# Patient Record
Sex: MALE | Race: WHITE | HISPANIC OR LATINO | Employment: OTHER | URBAN - METROPOLITAN AREA
[De-identification: names, ages, dates, MRNs, and addresses within clinical notes are randomized per-mention and may not be internally consistent; named-entity substitution may affect disease eponyms.]

---

## 2019-07-02 ENCOUNTER — HOSPITAL ENCOUNTER (EMERGENCY)
Facility: HOSPITAL | Age: 59
Discharge: HOME | End: 2019-07-02
Attending: EMERGENCY MEDICINE
Payer: MEDICAID

## 2019-07-02 VITALS
TEMPERATURE: 99 F | SYSTOLIC BLOOD PRESSURE: 182 MMHG | BODY MASS INDEX: 21.49 KG/M2 | OXYGEN SATURATION: 99 % | RESPIRATION RATE: 18 BRPM | HEART RATE: 79 BPM | HEIGHT: 65 IN | WEIGHT: 129 LBS | DIASTOLIC BLOOD PRESSURE: 73 MMHG

## 2019-07-02 DIAGNOSIS — I73.9 PAD (PERIPHERAL ARTERY DISEASE): Primary | ICD-10-CM

## 2019-07-02 DIAGNOSIS — I73.9 PVD (PERIPHERAL VASCULAR DISEASE) WITH CLAUDICATION: ICD-10-CM

## 2019-07-02 LAB
ANION GAP SERPL CALC-SCNC: 18 MMOL/L (ref 8–16)
BASOPHILS # BLD AUTO: 0.03 K/UL (ref 0–0.2)
BASOPHILS NFR BLD: 0.2 % (ref 0–1.9)
BUN SERPL-MCNC: 18 MG/DL (ref 6–30)
CHLORIDE SERPL-SCNC: 104 MMOL/L (ref 95–110)
CREAT SERPL-MCNC: 0.6 MG/DL (ref 0.5–1.4)
DIFFERENTIAL METHOD: ABNORMAL
EOSINOPHIL # BLD AUTO: 0.1 K/UL (ref 0–0.5)
EOSINOPHIL NFR BLD: 0.7 % (ref 0–8)
ERYTHROCYTE [DISTWIDTH] IN BLOOD BY AUTOMATED COUNT: 14.6 % (ref 11.5–14.5)
GLUCOSE SERPL-MCNC: 119 MG/DL (ref 70–110)
HCT VFR BLD AUTO: 46.1 % (ref 40–54)
HCT VFR BLD CALC: 47 %PCV (ref 36–54)
HGB BLD-MCNC: 14.8 G/DL (ref 14–18)
LYMPHOCYTES # BLD AUTO: 3.4 K/UL (ref 1–4.8)
LYMPHOCYTES NFR BLD: 25.7 % (ref 18–48)
MCH RBC QN AUTO: 30.5 PG (ref 27–31)
MCHC RBC AUTO-ENTMCNC: 32.1 G/DL (ref 32–36)
MCV RBC AUTO: 95 FL (ref 82–98)
MONOCYTES # BLD AUTO: 1 K/UL (ref 0.3–1)
MONOCYTES NFR BLD: 7.5 % (ref 4–15)
NEUTROPHILS # BLD AUTO: 8.7 K/UL (ref 1.8–7.7)
NEUTROPHILS NFR BLD: 66.4 % (ref 38–73)
PLATELET # BLD AUTO: 328 K/UL (ref 150–350)
PMV BLD AUTO: 11.5 FL (ref 9.2–12.9)
POC IONIZED CALCIUM: 1.28 MMOL/L (ref 1.06–1.42)
POC TCO2 (MEASURED): 28 MMOL/L (ref 23–29)
POTASSIUM BLD-SCNC: 4.3 MMOL/L (ref 3.5–5.1)
RBC # BLD AUTO: 4.85 M/UL (ref 4.6–6.2)
SAMPLE: ABNORMAL
SODIUM BLD-SCNC: 145 MMOL/L (ref 136–145)
WBC # BLD AUTO: 13.25 K/UL (ref 3.9–12.7)

## 2019-07-02 PROCEDURE — 99285 EMERGENCY DEPT VISIT HI MDM: CPT | Mod: 25

## 2019-07-02 PROCEDURE — 84295 ASSAY OF SERUM SODIUM: CPT

## 2019-07-02 PROCEDURE — 85014 HEMATOCRIT: CPT

## 2019-07-02 PROCEDURE — 82330 ASSAY OF CALCIUM: CPT

## 2019-07-02 PROCEDURE — 82565 ASSAY OF CREATININE: CPT

## 2019-07-02 PROCEDURE — 99285 PR EMERGENCY DEPT VISIT,LEVEL V: ICD-10-PCS | Mod: ,,, | Performed by: SURGERY

## 2019-07-02 PROCEDURE — 85025 COMPLETE CBC W/AUTO DIFF WBC: CPT

## 2019-07-02 PROCEDURE — 99900035 HC TECH TIME PER 15 MIN (STAT)

## 2019-07-02 PROCEDURE — 99285 EMERGENCY DEPT VISIT HI MDM: CPT | Mod: ,,, | Performed by: SURGERY

## 2019-07-02 PROCEDURE — 84132 ASSAY OF SERUM POTASSIUM: CPT

## 2019-07-02 PROCEDURE — 25000003 PHARM REV CODE 250: Performed by: PHYSICIAN ASSISTANT

## 2019-07-02 PROCEDURE — 25500020 PHARM REV CODE 255: Performed by: EMERGENCY MEDICINE

## 2019-07-02 RX ORDER — GABAPENTIN 300 MG/1
300 CAPSULE ORAL
COMMUNITY
Start: 2019-06-10 | End: 2022-12-05

## 2019-07-02 RX ORDER — AMLODIPINE BESYLATE 5 MG/1
5 TABLET ORAL DAILY
Status: ON HOLD | COMMUNITY
End: 2019-08-14 | Stop reason: HOSPADM

## 2019-07-02 RX ORDER — PRAVASTATIN SODIUM 40 MG/1
40 TABLET ORAL DAILY
COMMUNITY
End: 2022-10-13

## 2019-07-02 RX ORDER — ACETAMINOPHEN 325 MG/1
650 TABLET ORAL
Status: COMPLETED | OUTPATIENT
Start: 2019-07-02 | End: 2019-07-02

## 2019-07-02 RX ORDER — ERGOCALCIFEROL 1.25 MG/1
50000 CAPSULE ORAL
COMMUNITY
End: 2023-03-23 | Stop reason: SDUPTHER

## 2019-07-02 RX ORDER — METFORMIN HYDROCHLORIDE 500 MG/1
1000 TABLET ORAL 2 TIMES DAILY WITH MEALS
COMMUNITY
End: 2022-10-13

## 2019-07-02 RX ORDER — TAMSULOSIN HYDROCHLORIDE 0.4 MG/1
0.4 CAPSULE ORAL DAILY
COMMUNITY
End: 2022-10-13 | Stop reason: SDUPTHER

## 2019-07-02 RX ORDER — HYDROCODONE BITARTRATE AND ACETAMINOPHEN 5; 325 MG/1; MG/1
1 TABLET ORAL EVERY 6 HOURS PRN
Qty: 12 TABLET | Refills: 0 | Status: SHIPPED | OUTPATIENT
Start: 2019-07-02 | End: 2019-07-29

## 2019-07-02 RX ADMIN — IOHEXOL 100 ML: 350 INJECTION, SOLUTION INTRAVENOUS at 06:07

## 2019-07-02 RX ADMIN — ACETAMINOPHEN 650 MG: 325 TABLET, FILM COATED ORAL at 05:07

## 2019-07-02 NOTE — ED TRIAGE NOTES
Patient presents to the ED via personal transportation alone. Patient reports left lower leg and left foot numbness x 1 month. Patient states that he was seen at  and prescribed 300 mg of gabapentin, which is not improving symptoms. Reports difficulty walking due to numbness. Reports hx of DM. Denies hx of strokes. Denies difficulty speaking, vision changes, headaches.

## 2019-07-02 NOTE — ED PROVIDER NOTES
Encounter Date: 7/2/2019       History     Chief Complaint   Patient presents with    Numbness     Numbness to left foot and lower leg x 1 mth.  No relief with gabapentin.     59-year-old male with past medical history including diabetes, hypertension, hyperlipidemia, seizures presents for evaluation of left toe numbness and foot and leg pain for at least 1 month.  Pain is primarily to the dorsum of the foot, extending proximally above the ankle, as well as the distal calf.  He reports the pain is worse when walking, and also worse at night when lying in bed.  He denies swelling, temperature or color change.  He denies injury, wounds, or fever.  He was seen on June 10th at an outside hospital emergency department, treated with gabapentin, and discharged with instructions to follow up with vascular surgery.  He reports having an appointment with unknown vascular surgeon in August, but was unable to wait due to the pain. The gabapentin has not improved his pain.        Review of patient's allergies indicates:  No Known Allergies  Past Medical History:   Diagnosis Date    Diabetes mellitus     Hyperlipidemia     Hypertension     Seizures      Past Surgical History:   Procedure Laterality Date    BRAIN SURGERY      patient states that he had surgery for seizures     History reviewed. No pertinent family history.  Social History     Tobacco Use    Smoking status: Current Every Day Smoker   Substance Use Topics    Alcohol use: Not Currently    Drug use: Not Currently     Review of Systems   Constitutional: Negative for fever.   Respiratory: Negative for shortness of breath.    Cardiovascular: Negative for chest pain.   Gastrointestinal: Negative for nausea.   Musculoskeletal: Positive for arthralgias and myalgias. Negative for back pain and joint swelling.   Skin: Negative for color change, pallor and rash.   Neurological: Positive for numbness. Negative for weakness.   Hematological: Does not bruise/bleed  easily.       Physical Exam     Initial Vitals [07/02/19 1124]   BP Pulse Resp Temp SpO2   (!) 129/58 95 18 98.3 °F (36.8 °C) 97 %      MAP       --         Physical Exam    Vitals reviewed.  Constitutional: He appears well-developed and well-nourished. He is not diaphoretic. No distress.   HENT:   Head: Normocephalic and atraumatic.   Right Ear: External ear normal.   Left Ear: External ear normal.   Nose: Nose normal.   Eyes: Conjunctivae are normal. No scleral icterus.   Neck: Normal range of motion. Neck supple.   Cardiovascular: Normal rate, regular rhythm, normal heart sounds and intact distal pulses.   Pulses:       Radial pulses are 2+ on the right side, and 2+ on the left side.        Popliteal pulses are 1+ on the right side, and 1+ on the left side.        Dorsalis pedis pulses are 1+ on the right side, and 1+ on the left side.        Posterior tibial pulses are 1+ on the right side, and 1+ on the left side.   Pulmonary/Chest: Breath sounds normal. No respiratory distress. He has no wheezes. He has no rhonchi. He has no rales.   Musculoskeletal: Normal range of motion.   Neurological: He is alert and oriented to person, place, and time.   Skin: Skin is warm and dry. No rash noted. No erythema.         ED Course   Procedures  Labs Reviewed - No data to display       Imaging Results          US Lower Extrem Arteries Left with CHRISTIE (Final result)  Result time 07/02/19 14:48:25    Final result by Mayito Espinal MD (07/02/19 14:48:25)                 Impression:      Significant decreased left CHRISTIE consistent with severe peripheral atherosclerotic disease.    Decreased systolic velocities and parvus tardus waveforms of the left popliteal artery and the left runoff vessels consistent with severe atherosclerotic disease.      Electronically signed by: Mayito Espinal  Date:    07/02/2019  Time:    14:48             Narrative:    EXAMINATION:  US ARTERIAL LOWER EXTREMITY LEFT WITH CHRISTIE (XPD)    CLINICAL  HISTORY:  left foot numbness and pain;    TECHNIQUE:  Color Doppler and waveform and grayscale images are obtained.    COMPARISON:  None    FINDINGS:  Segmental pressures for the right and left brachial arteries are respectively 151 and 156.  The segmental pressures for the right  and right posterior tibial is 288.  On the left no dorsalis pedis pulse obtained.  The left posterior tibialis is 45.  This gives a left CHRISTIE of 0.29 indicating severe peripheral atherosclerotic disease.    The right ABIs are 188 and 185, this apparent elevation could indicate noncompressible arteries and still indicate significant atherosclerotic disease.    The left common femoral artery shows a normal waveform with peak systolic velocity of 65.4 centimeters/second.  The mid left SFA has peak systolic velocities of 50.3 and 26.4 centimeters/second with normal waveform suggesting atherosclerotic disease.  The the distal left SFA shows peak systolic velocity 71.2 centimeters/second with normal waveform.    The left popliteal artery shows a parvus tardus waveform and decreased peak systolic velocity of 18.5 centimeters/second.  The left peroneal artery also shows blunted delayed waveform with low peak systolic velocity of 18.5 centimeters/second.  The left posterior tibial artery shows very blunted waveform and peak systolic velocity of 9.7-10.9 cm per 2nd.  The left anterior tibial artery shows very blunted waveform with peak systolic velocity of 23.3-25.2.  The left dorsalis pedis artery shows very blunted waveform with peak systolic velocity 5.9 centimeters/second.                              X-Rays:   Independently Interpreted Readings:   Other Readings:  Ultrasound with CHRISTIE consistent with severe left peripheral atherosclerotic disease    Medical Decision Making:   ED Management:  59-year-old male with 1 month history of left foot pain and paresthesias concerning for vascular disease.  Weak pulses obtained on exam.  No evidence  of acute ischemic limb.  Ultrasound with CHRISTIE shows severe peripheral atherosclerotic disease.  Vascular surgery consulted. Dr. Pyle evaluated the patient in the ED, request CTA abdomen pelvis runoff with bilateral lower extremities and have patient follow up in clinic.  No evidence of infectious process or DVT.  Patient provided short course of analgesics and discharged with vascular surgery follow-up information and return precautions.  He verbalized understanding and agreed with plan.  Case also discussed with the ED attending Dr. Foley.                         Clinical Impression:       ICD-10-CM ICD-9-CM   1. PAD (peripheral artery disease) I73.9 443.9   2. PVD (peripheral vascular disease) with claudication I73.9 443.9                                Johnathan Oneal, PA-C  07/02/19 1261

## 2019-07-03 ENCOUNTER — TELEPHONE (OUTPATIENT)
Dept: VASCULAR SURGERY | Facility: CLINIC | Age: 59
End: 2019-07-03

## 2019-07-03 ENCOUNTER — PES CALL (OUTPATIENT)
Dept: ADMINISTRATIVE | Facility: CLINIC | Age: 59
End: 2019-07-03

## 2019-07-03 DIAGNOSIS — I73.9 PVD (PERIPHERAL VASCULAR DISEASE) WITH CLAUDICATION: Primary | ICD-10-CM

## 2019-07-03 NOTE — SUBJECTIVE & OBJECTIVE
(Not in a hospital admission)    Review of patient's allergies indicates:  No Known Allergies    Past Medical History:   Diagnosis Date    Diabetes mellitus     Hyperlipidemia     Hypertension     Seizures      Past Surgical History:   Procedure Laterality Date    BRAIN SURGERY      patient states that he had surgery for seizures     Family History     None        Tobacco Use    Smoking status: Current Every Day Smoker   Substance and Sexual Activity    Alcohol use: Not Currently    Drug use: Not Currently    Sexual activity: Not on file     Review of Systems   Constitutional: Negative for chills.   HENT: Negative for congestion.    Eyes: Negative for visual disturbance.   Respiratory: Negative for shortness of breath.    Cardiovascular: Negative for chest pain.   Gastrointestinal: Negative for abdominal distention.   Endocrine: Negative for cold intolerance.   Genitourinary: Negative for flank pain.   Musculoskeletal: Negative for back pain.   Skin: Positive for color change. Negative for pallor, rash and wound.   Allergic/Immunologic: Negative for immunocompromised state.   Neurological: Negative for dizziness and weakness.   Hematological: Does not bruise/bleed easily.   Psychiatric/Behavioral: Negative for agitation.     Objective:     Vital Signs (Most Recent):  Temp: 98.6 °F (37 °C) (07/02/19 1842)  Pulse: 79 (07/02/19 1842)  Resp: 18 (07/02/19 1842)  BP: (!) 182/73 (07/02/19 1842)  SpO2: 99 % (07/02/19 1842) Vital Signs (24h Range):  Temp:  [98 °F (36.7 °C)-98.6 °F (37 °C)] 98.6 °F (37 °C)  Pulse:  [74-95] 79  Resp:  [15-18] 18  SpO2:  [95 %-99 %] 99 %  BP: (129-182)/(58-74) 182/73     Weight: 58.5 kg (129 lb)  Body mass index is 21.47 kg/m².    Physical Exam   Constitutional: He is oriented to person, place, and time. He appears well-developed and well-nourished. No distress.   HENT:   Head: Normocephalic and atraumatic.   Eyes: Conjunctivae are normal.   Neck: Neck supple.   Cardiovascular:  Normal rate.   Pulses:       Femoral pulses are 2+ on the right side, and 1+ on the left side.       Dorsalis pedis pulses are Detected w/ doppler on the right side, and Detected w/ doppler on the left side.        Posterior tibial pulses are Detected w/ doppler on the right side, and Detected w/ doppler on the left side.   Pulmonary/Chest: Effort normal.   Abdominal: Soft. He exhibits no distension and no mass. There is no tenderness. There is no rebound and no guarding. No hernia.   Musculoskeletal: Normal range of motion. He exhibits no edema, tenderness or deformity.   Feet:   Right Foot:   Skin Integrity: Negative for ulcer.   Left Foot:   Skin Integrity: Negative for ulcer.   Neurological: He is alert and oriented to person, place, and time. A sensory deficit is present.   Skin: Capillary refill takes 2 to 3 seconds. No rash noted. He is not diaphoretic.        Psychiatric: He has a normal mood and affect.   Vitals reviewed.      Significant Labs:  All pertinent labs from the last 24 hours have been reviewed.    Significant Diagnostics:  I have reviewed all pertinent imaging results/findings within the past 24 hours.

## 2019-07-03 NOTE — HPI
Cj Pyle MD VI                       [facility]                         07/02/2019    HPI:  Jerry Galeana is a 59 y.o. male with There is no problem list on file for this patient.   being managed by PCP and specialists who is here today for evaluation of L foot pain.  Patient states location is L foot occurring for 1 mo.  Went to  [facility] recently and was Rx Gabapentin.  Smokes 1 ppd.  Had vascular surgery f/u at  8/2019.  States he cannot wait until then due to pain.  Associated signs and symptoms include 1/2 block claudication.  Quality is aching and severity is 8/10 at worst.  Symptoms began 1 mo ago.  Alleviating factors include rest and pain medication.  Worsening factors include ambulation.    no MI  no Stroke  Tobacco use: 1 ppd    Past Medical History:   Diagnosis Date    Diabetes mellitus     Hyperlipidemia     Hypertension     Seizures      Past Surgical History:   Procedure Laterality Date    BRAIN SURGERY      patient states that he had surgery for seizures     History reviewed. No pertinent family history.  Social History     Socioeconomic History    Marital status: Legally      Spouse name: Not on file    Number of children: Not on file    Years of education: Not on file    Highest education level: Not on file   Occupational History    Not on file   Social Needs    Financial resource strain: Not on file    Food insecurity:     Worry: Not on file     Inability: Not on file    Transportation needs:     Medical: Not on file     Non-medical: Not on file   Tobacco Use    Smoking status: Current Every Day Smoker   Substance and Sexual Activity    Alcohol use: Not Currently    Drug use: Not Currently    Sexual activity: Not on file   Lifestyle    Physical activity:     Days per week: Not on file     Minutes per session: Not on file    Stress: Not on file   Relationships    Social connections:     Talks on phone: Not on file     Gets together:  Not on file     Attends [employer] service: Not on file     Active member of club or organization: Not on file     Attends meetings of clubs or organizations: Not on file     Relationship status: Not on file   Other Topics Concern    Not on file   Social History Narrative    Not on file     No current facility-administered medications for this encounter.     Current Outpatient Medications:     amLODIPine (NORVASC) 5 MG tablet, Take 5 mg by mouth once daily., Disp: , Rfl:     dulaglutide (TRULICITY) 0.75 mg/0.5 mL PnIj, Inject 0.75 mg into the skin every 7 days., Disp: , Rfl:     ergocalciferol (VITAMIN D2) 50,000 unit Cap, Take 50,000 Units by mouth every 7 days., Disp: , Rfl:     gabapentin (NEURONTIN) 300 MG capsule, Take 300 mg by mouth., Disp: , Rfl:     latanoprost 0.005 % dpem, Apply to eye., Disp: , Rfl:     metFORMIN (GLUCOPHAGE) 500 MG tablet, Take 1,000 mg by mouth 2 (two) times daily with meals. , Disp: , Rfl:     pravastatin (PRAVACHOL) 40 MG tablet, Take 40 mg by mouth once daily., Disp: , Rfl:     tamsulosin (FLOMAX) 0.4 mg Cap, Take 0.4 mg by mouth once daily., Disp: , Rfl:     HYDROcodone-acetaminophen (NORCO) 5-325 mg per tablet, Take 1 tablet by mouth every 6 (six) hours as needed for Pain., Disp: 12 tablet, Rfl: 0

## 2019-07-03 NOTE — ASSESSMENT & PLAN NOTE
-Imaging reviewed.  Pt would benefit from workup for LLE angiogram, angioplasty vs stent placement and possible bypass.    -Will obtain vein mapping  -Prudent that he quits smoking for limb salvage  -Cont exercise

## 2019-07-03 NOTE — CONSULTS
[facility]  Vascular Surgery  Consult Note    Consults  Subjective:     Chief Complaint/Reason for Admission: PVD    History of Present Illness:             Cj Pyle MD RPVI                       [facility]                         07/02/2019    HPI:  Jerry Galeana is a 59 y.o. male with There is no problem list on file for this patient.   being managed by PCP and specialists who is here today for evaluation of L foot pain.  Patient states location is L foot occurring for 1 mo.  Went to  [facility] recently and was Rx Gabapentin.  Smokes 1 ppd.  Had vascular surgery f/u at  8/2019.  States he cannot wait until then due to pain.  Associated signs and symptoms include 1/2 block claudication.  Quality is aching and severity is 8/10 at worst.  Symptoms began 1 mo ago.  Alleviating factors include rest and pain medication.  Worsening factors include ambulation.    no MI  no Stroke  Tobacco use: 1 ppd    Past Medical History:   Diagnosis Date    Diabetes mellitus     Hyperlipidemia     Hypertension     Seizures      Past Surgical History:   Procedure Laterality Date    BRAIN SURGERY      patient states that he had surgery for seizures     History reviewed. No pertinent family history.  Social History     Socioeconomic History    Marital status: Legally      Spouse name: Not on file    Number of children: Not on file    Years of education: Not on file    Highest education level: Not on file   Occupational History    Not on file   Social Needs    Financial resource strain: Not on file    Food insecurity:     Worry: Not on file     Inability: Not on file    Transportation needs:     Medical: Not on file     Non-medical: Not on file   Tobacco Use    Smoking status: Current Every Day Smoker   Substance and Sexual Activity    Alcohol use: Not Currently    Drug use: Not Currently    Sexual activity: Not on file   Lifestyle    Physical activity:     Days per week:  Not on file     Minutes per session: Not on file    Stress: Not on file   Relationships    Social connections:     Talks on phone: Not on file     Gets together: Not on file     Attends Latter day service: Not on file     Active member of club or organization: Not on file     Attends meetings of clubs or organizations: Not on file     Relationship status: Not on file   Other Topics Concern    Not on file   Social History Narrative    Not on file     No current facility-administered medications for this encounter.     Current Outpatient Medications:     amLODIPine (NORVASC) 5 MG tablet, Take 5 mg by mouth once daily., Disp: , Rfl:     dulaglutide (TRULICITY) 0.75 mg/0.5 mL PnIj, Inject 0.75 mg into the skin every 7 days., Disp: , Rfl:     ergocalciferol (VITAMIN D2) 50,000 unit Cap, Take 50,000 Units by mouth every 7 days., Disp: , Rfl:     gabapentin (NEURONTIN) 300 MG capsule, Take 300 mg by mouth., Disp: , Rfl:     latanoprost 0.005 % dpem, Apply to eye., Disp: , Rfl:     metFORMIN (GLUCOPHAGE) 500 MG tablet, Take 1,000 mg by mouth 2 (two) times daily with meals. , Disp: , Rfl:     pravastatin (PRAVACHOL) 40 MG tablet, Take 40 mg by mouth once daily., Disp: , Rfl:     tamsulosin (FLOMAX) 0.4 mg Cap, Take 0.4 mg by mouth once daily., Disp: , Rfl:     HYDROcodone-acetaminophen (NORCO) 5-325 mg per tablet, Take 1 tablet by mouth every 6 (six) hours as needed for Pain., Disp: 12 tablet, Rfl: 0        (Not in a hospital admission)    Review of patient's allergies indicates:  No Known Allergies    Past Medical History:   Diagnosis Date    Diabetes mellitus     Hyperlipidemia     Hypertension     Seizures      Past Surgical History:   Procedure Laterality Date    BRAIN SURGERY      patient states that he had surgery for seizures     Family History     None        Tobacco Use    Smoking status: Current Every Day Smoker   Substance and Sexual Activity    Alcohol use: Not Currently    Drug use: Not  Currently    Sexual activity: Not on file     Review of Systems   Constitutional: Negative for chills.   HENT: Negative for congestion.    Eyes: Negative for visual disturbance.   Respiratory: Negative for shortness of breath.    Cardiovascular: Negative for chest pain.   Gastrointestinal: Negative for abdominal distention.   Endocrine: Negative for cold intolerance.   Genitourinary: Negative for flank pain.   Musculoskeletal: Negative for back pain.   Skin: Positive for color change. Negative for pallor, rash and wound.   Allergic/Immunologic: Negative for immunocompromised state.   Neurological: Negative for dizziness and weakness.   Hematological: Does not bruise/bleed easily.   Psychiatric/Behavioral: Negative for agitation.     Objective:     Vital Signs (Most Recent):  Temp: 98.6 °F (37 °C) (07/02/19 1842)  Pulse: 79 (07/02/19 1842)  Resp: 18 (07/02/19 1842)  BP: (!) 182/73 (07/02/19 1842)  SpO2: 99 % (07/02/19 1842) Vital Signs (24h Range):  Temp:  [98 °F (36.7 °C)-98.6 °F (37 °C)] 98.6 °F (37 °C)  Pulse:  [74-95] 79  Resp:  [15-18] 18  SpO2:  [95 %-99 %] 99 %  BP: (129-182)/(58-74) 182/73     Weight: 58.5 kg (129 lb)  Body mass index is 21.47 kg/m².    Physical Exam   Constitutional: He is oriented to person, place, and time. He appears well-developed and well-nourished. No distress.   HENT:   Head: Normocephalic and atraumatic.   Eyes: Conjunctivae are normal.   Neck: Neck supple.   Cardiovascular: Normal rate.   Pulses:       Femoral pulses are 2+ on the right side, and 1+ on the left side.       Dorsalis pedis pulses are Detected w/ doppler on the right side, and Detected w/ doppler on the left side.        Posterior tibial pulses are Detected w/ doppler on the right side, and Detected w/ doppler on the left side.   Pulmonary/Chest: Effort normal.   Abdominal: Soft. He exhibits no distension and no mass. There is no tenderness. There is no rebound and no guarding. No hernia.   Musculoskeletal: Normal  range of motion. He exhibits no edema, tenderness or deformity.   Feet:   Right Foot:   Skin Integrity: Negative for ulcer.   Left Foot:   Skin Integrity: Negative for ulcer.   Neurological: He is alert and oriented to person, place, and time. A sensory deficit is present.   Skin: Capillary refill takes 2 to 3 seconds. No rash noted. He is not diaphoretic.        Psychiatric: He has a normal mood and affect.   Vitals reviewed.      Significant Labs:  All pertinent labs from the last 24 hours have been reviewed.    Significant Diagnostics:  I have reviewed all pertinent imaging results/findings within the past 24 hours.    Assessment/Plan:     PVD (peripheral vascular disease) with claudication  -Imaging reviewed.  Pt would benefit from workup for LLE angiogram, angioplasty vs stent placement and possible bypass.    -Will obtain vein mapping  -Prudent that he quits smoking for limb salvage  -Cont exercise        Thank you for your consult. I will follow-up with patient. Please contact us if you have any additional questions.    Cj Pyle MD  Vascular Surgery  [facility]

## 2019-07-29 ENCOUNTER — OFFICE VISIT (OUTPATIENT)
Dept: VASCULAR SURGERY | Facility: CLINIC | Age: 59
End: 2019-07-29
Payer: MEDICAID

## 2019-07-29 ENCOUNTER — HOSPITAL ENCOUNTER (OUTPATIENT)
Dept: RADIOLOGY | Facility: HOSPITAL | Age: 59
Discharge: HOME | End: 2019-07-29
Attending: SURGERY
Payer: MEDICAID

## 2019-07-29 VITALS
BODY MASS INDEX: 20 KG/M2 | DIASTOLIC BLOOD PRESSURE: 66 MMHG | HEIGHT: 65 IN | SYSTOLIC BLOOD PRESSURE: 112 MMHG | WEIGHT: 120.06 LBS | HEART RATE: 95 BPM

## 2019-07-29 DIAGNOSIS — F17.210 NICOTINE DEPENDENCE, CIGARETTES, UNCOMPLICATED: ICD-10-CM

## 2019-07-29 DIAGNOSIS — I70.222 ATHEROSCLEROSIS OF NATIVE ARTERY OF LEFT LOWER EXTREMITY WITH REST PAIN: Primary | ICD-10-CM

## 2019-07-29 DIAGNOSIS — I73.9 PVD (PERIPHERAL VASCULAR DISEASE) WITH CLAUDICATION: ICD-10-CM

## 2019-07-29 PROCEDURE — G0365 US VEIN MAPPING: ICD-10-PCS | Mod: 26,,, | Performed by: RADIOLOGY

## 2019-07-29 PROCEDURE — 99214 OFFICE O/P EST MOD 30 MIN: CPT | Mod: PBBFAC,25 | Performed by: SURGERY

## 2019-07-29 PROCEDURE — 99999 PR PBB SHADOW E&M-EST. PATIENT-LVL IV: ICD-10-PCS | Mod: PBBFAC,,, | Performed by: SURGERY

## 2019-07-29 PROCEDURE — 99214 PR OFFICE/OUTPT VISIT, EST, LEVL IV, 30-39 MIN: ICD-10-PCS | Mod: S$PBB,,, | Performed by: SURGERY

## 2019-07-29 PROCEDURE — G0365 VESSEL MAPPING HEMO ACCESS: HCPCS | Mod: TC

## 2019-07-29 PROCEDURE — 99214 OFFICE O/P EST MOD 30 MIN: CPT | Mod: S$PBB,,, | Performed by: SURGERY

## 2019-07-29 PROCEDURE — 99999 PR PBB SHADOW E&M-EST. PATIENT-LVL IV: CPT | Mod: PBBFAC,,, | Performed by: SURGERY

## 2019-07-29 PROCEDURE — G0365 VESSEL MAPPING HEMO ACCESS: HCPCS | Mod: 26,,, | Performed by: RADIOLOGY

## 2019-07-29 NOTE — LETTER
July 29, 2019        Babak Bryan MD  [address] [address]             [facility]  [address] [facility] [address]  [facility] [address]  Phone: [phone]  Fax: [phone]   Patient: Jerry Galeana   MR Number: [id number]   YOB: 1960   Date of Visit: 7/29/2019       Dear Dr. Bryan:    Thank you for referring Jerry Galeana to me for evaluation. Below are the relevant portions of my assessment and plan of care.            If you have questions, please do not hesitate to call me. I look forward to following Jerry along with you.    Sincerely,      Cj Pyle MD           CC  No Recipients

## 2019-07-29 NOTE — PATIENT INSTRUCTIONS
Understanding Peripheral Arterial Disease    Peripheral arteries deliver oxygen-rich blood to the tissues outside the heart. As you age, your arteries become stiffer and thicker. In addition, risk factors, such as smoking and high cholesterol, can damage the artery lining. This allows a buildup of fat and other materials (plaque) to form within the artery walls. The buildup of plaque narrows the space inside the artery and sometimes blocks blood flow. Peripheral arterial disease (PAD) happens when blood flow through the arteries is reduced because of plaque buildup. It often happens in the legs and feet, but can also happen elsewhere in the body. If this buildup happens in the a large artery in the neck (carotid artery), it can be a major contributor to stroke.  A healthy artery  An artery is a muscular tube that carries oxgen rich blood and nutrients from the heart to the rest of the body. It has a smooth lining and flexible walls that allow blood to pass freely. When active, muscles need more oxygen. This increases blood flow. Healthy arteries can adapt to meet this need.  A damaged artery    PAD begins when the lining of an artery is damaged. This is often because of risk factors, such as smoking, older age, or diabetes. Plaque then starts to form within the artery wall. At this stage, blood flows normally, so youre not likely to have symptoms.  A narrowed artery    If plaque continues to build up, the space inside the artery narrows. The artery walls become less able to expand. The artery still provides enough blood and oxygen to your muscles during rest. But when youre active, the increased demand for blood cant be met. As a result, your leg may cramp or ache when you walk.  A blocked artery    An artery can become blocked by plaque or by a blood clot lodged in a narrowed section. When this happens, oxygen cant reach the muscle below the blockage. Then you may feel pain when lying down or when you are not  active (rest pain). This type of pain is especially common at night when youre lying flat. In time, the affected tissue can die. This can lead to the loss of a toe or foot.  Date Last Reviewed: 5/1/2016 © 2000-2017 [employer]. [address] [place] [id number]. All rights reserved. This information is not intended as a substitute for professional medical care. Always follow your healthcare professional's instructions.        Peripheral Artery Disease (PAD)     Peripheral artery disease (PAD) occurs when the arteries that carry blood to the limbs are narrowed or blocked. This is usually due to a buildup of a fatty substance called plaque in the walls of the arteries.  PAD most often affects the arteries in the legs. When these arteries are narrowed or blocked, blood flow to the legs is reduced. This can cause leg and foot pain and other symptoms. If severe enough, reduced blood flow to the legs can lead to tissue death (gangrene) and the loss of a toe, foot, or leg. Having PAD also makes it more likely that arteries in other body areas are blocked. For instance, arteries that carry blood to the heart or brain may be affected. This raises the chances of heart attack and stroke.  Risk factors  Certain factors can make PAD more likely. They include:  · Smoking  · Diabetes  · High blood pressure  · Unhealthy cholesterol levels  · Obesity  · Inactive lifestyle  · Older age  · Family history of PAD  Symptoms  Many people with PAD have no symptoms. If symptoms do occur, they can include:  · Pain in the muscles of the calves, thighs or hips that gets worse with activity and better with rest (intermittent claudication)  · Achy, tired, or heavy feeling in the legs  · Weakness, numbness, tingling, or loss of feeling in the legs  · Changes in skin color of the legs  · Sores on the legs and feet  · Cold leg, feet, or toes  · Pain the feet or toes even when lying down (rest pain)  Home care  PAD is a  chronic (lifelong) condition. Treatment is focused on managing your condition and lowering your health risks. This may include doing the following:  · If you smoke, quit. This helps prevent further damage to your arteries and lowers your health risks. Ask your provider about medicines or products that can help you quit smoking. Also consider joining a stop-smoking program or support group.  · Be more active. This helps you lose weight and manage problems such as high blood pressure and unhealthy cholesterol levels. Start a walking program if advised to by your provider. Your provider may also help you form a safe exercise program that is right for your needs.  · Make healthy eating changes. This includes eating less fat, salt, and sugar.  · Take medicines for high blood pressure, unhealthy cholesterol levels, and diabetes as directed.  · Have your blood pressure and cholesterol levels checked as often as directed.  · If you have diabetes, try to keep your blood sugar well controlled. Test your blood sugar as directed.  · If you are overweight, talk to your provider about forming a weight-loss plan.  · Watch for cuts, scrapes, or open sores on your feet. Poor blood flow to the feet may slow healing and increase the risk of infection from these problems.   Follow-up care  Follow up with your healthcare provider, or as advised. If imaging tests such as ultrasound were done, they will be reviewed by a doctor. You will be told the results and any new findings that may affect your care.  When to seek medical advice   Call your healthcare provider right away if any of these occur:  · Sudden severe pain in the legs or feet  · Sudden cold, pale or blue color in the legs or feet  · Weakness or numbness in the legs or feet that worsens  · Any sore or wound in the legs or feet that wont heal  · Weak pulse in your legs or feet  Know the Signs of Heart Attack and Stroke  People with PAD are at high risk for heart attack and  stroke. Knowing the signs of these problems can help you protect your health and get help when you need it. Call 911 right away if you have any of the following:  Signs of a Heart Attack  · Chest discomfort, such as pain, aching, tightness, or pressure that lasts more than a few minutes, or that comes and goes  · Pain or discomfort in the arms, back, shoulders, neck, or jaw  · Shortness of breath  · Sweating (often a cold, clammy sweat)  · Nausea  · Lightheadedness  Signs of a Stroke  · Sudden numbness or weakness of the face, arms, or legs, especially on one side  · Sudden confusion or trouble speaking or understanding  · Sudden trouble seeing in one or both eyes  · Sudden trouble walking, dizziness, or loss of balance  · Sudden, severe headache with no known cause   Date Last Reviewed: 9/21/2015  © 4651-9094 [employer]. [address] [place] [address]. All rights reserved. This information is not intended as a substitute for professional medical care. Always follow your healthcare professional's instructions.        A Walking Program for Peripheral Arterial Disease (PAD)  Peripheral arterial disease (PAD) is a condition where the arteries in the legs are severely damaged. When you have PAD, walking can be painful. So you may start to walk less. Walking less makes your leg muscles weaker. It then becomes harder and more painful to walk. A walking program can break this cycle. This sheet gives you tips on how to get started.     Walking farther without pain  Exercise strengthens leg muscles that are out of shape. It also trains these muscles to work with less oxygen. This helps your leg muscles work better even with reduced blood flow to your legs. When you have PAD, a walking program can be helpful. Your program can:  · Let you walk longer and farther without claudication. This is an ache in your legs during exercise that goes away with rest.  · Let you do more and be more active  · Add to  your overall health and well-being  · Help you control your blood sugar and blood pressure  · Help you become healthier with no risk and at little or no cost  Getting started  Your local hospital, vascular center, or cardiac rehab center may have a special walking program for people with PAD. If so, this is your best option. But if you cant find a program, or its not covered by insurance, you can still walk on your own. Follow these steps at each session:  · Step 1. Start at a pace that lets you walk for 5 to 10 minutes before you start to feel claudication. This feeling is unpleasant, but it doesnt hurt you. Keep going until the pain makes you feel that you need to stop.  · Step 2. Stop and rest for 3 to 5 minutes, just long enough for the pain to go away. You can rest standing or sitting. (Some people like to bring along a cane or a lightweight folding chair.)  · Step 3. Again walk at a pace that lets you walk for 5 to 10 minutes more before you feel pain. This may be slower than your starting pace in step 1. Then rest again.  · Step 4. Repeat this process until youve walked for 45 minutes. This should be about 60 to 80 minutes total, including resting time. You may not be able to do a full 45 minutes at first. Do as much as you can, and increase your time as you improve.  Making the most of your program  · Walk at least 3 times a week. Take no more than 2 days off between sessions. If you stop walking, even for a week or two, you can lose the health benefits of your program.  · Find a good place to walk. A treadmill or a track may be better at first. That way, you wont run the risk of going too far and finding that you cant walk back. Be sure to have a place to walk indoors in bad weather, such as a gym or a mall.  · Wear shoes with sturdy, flexible soles. The heel should fit without slipping. You should have room to wiggle your toes.  · Keep track of how long you walk. A pedometer will show your daily  progress. It can also show how much farther you can walk as time goes by.  · Ask a friend to keep you company. You may enjoy walking with someone else. Or you may want to make your walking sessions a time to relax by yourself.  · Make it fun. Listen to music while you walk and rest. Walk in a favorite park. Get to know the people at the gym, or the folks that you pass on your route. While you rest, you can window-shop, read, or feed the birds. Do whatever will help you enjoy your exercise sessions.  Date Last Reviewed: 6/1/2016  © 7787-4804 [employer]. [address] [place] [address], [place] [address]. All rights reserved. This information is not intended as a substitute for professional medical care. Always follow your healthcare professional's instructions.

## 2019-07-29 NOTE — PROGRESS NOTES
Cj Pyle MD VI                       [facility]                         07/29/2019    HPI:  Jerry Galeana is a 59 y.o. male with   Patient Active Problem List   Diagnosis    PVD (peripheral vascular disease) with claudication   being managed by PCP and specialists who is here today for evaluation of L foot pain.  Patient states location is L foot occurring for 1 mo.  Went to  ER recently and was Rx Gabapentin.  Smokes 1 ppd.  Had vascular surgery f/u at  8/2019.  States he cannot wait until then due to pain.  Associated signs and symptoms include 1/2 block claudication.  Quality is aching and severity is 8/10 at worst.  Symptoms began 1 mo ago.  Alleviating factors include rest and pain medication.  Worsening factors include ambulation.     no MI  no Stroke  Tobacco use: 1 ppd    Interval history: Cont to c/o L foot pain, dangling at night helps. Smoking 1 pack / 3 days.      Past Medical History:   Diagnosis Date    Diabetes mellitus     Hyperlipidemia     Hypertension     Seizures      Past Surgical History:   Procedure Laterality Date    BRAIN SURGERY      patient states that he had surgery for seizures     History reviewed. No pertinent family history.  Social History     Socioeconomic History    Marital status: Legally      Spouse name: Not on file    Number of children: Not on file    Years of education: Not on file    Highest education level: Not on file   Occupational History    Not on file   Social Needs    Financial resource strain: Not on file    Food insecurity:     Worry: Not on file     Inability: Not on file    Transportation needs:     Medical: Not on file     Non-medical: Not on file   Tobacco Use    Smoking status: Current Every Day Smoker   Substance and Sexual Activity    Alcohol use: Not Currently    Drug use: Not Currently    Sexual activity: Not on file   Lifestyle    Physical activity:     Days per week: Not on file     Minutes  per session: Not on file    Stress: Not on file   Relationships    Social connections:     Talks on phone: Not on file     Gets together: Not on file     Attends Church service: Not on file     Active member of club or organization: Not on file     Attends meetings of clubs or organizations: Not on file     Relationship status: Not on file   Other Topics Concern    Not on file   Social History Narrative    Not on file       Current Outpatient Medications:     amLODIPine (NORVASC) 5 MG tablet, Take 5 mg by mouth once daily., Disp: , Rfl:     dulaglutide (TRULICITY) 0.75 mg/0.5 mL PnIj, Inject 0.75 mg into the skin every 7 days., Disp: , Rfl:     ergocalciferol (VITAMIN D2) 50,000 unit Cap, Take 50,000 Units by mouth every 7 days., Disp: , Rfl:     gabapentin (NEURONTIN) 300 MG capsule, Take 300 mg by mouth., Disp: , Rfl:     latanoprost 0.005 % dpem, Apply to eye., Disp: , Rfl:     metFORMIN (GLUCOPHAGE) 500 MG tablet, Take 1,000 mg by mouth 2 (two) times daily with meals. , Disp: , Rfl:     pravastatin (PRAVACHOL) 40 MG tablet, Take 40 mg by mouth once daily., Disp: , Rfl:     tamsulosin (FLOMAX) 0.4 mg Cap, Take 0.4 mg by mouth once daily., Disp: , Rfl:     REVIEW OF SYSTEMS:  General: No fevers or chills; ENT: No sore throat; Allergy and Immunology: no persistent infections; Hematological and Lymphatic: No history of bleeding or easy bruising; Endocrine: negative; Respiratory: no cough, shortness of breath, or wheezing; Cardiovascular: no chest pain or dyspnea on exertion; Gastrointestinal: no abdominal pain/back, change in bowel habits, or bloody stools; Genito-Urinary: no dysuria, trouble voiding, or hematuria; Musculoskeletal: negative; Neurological: no TIA or stroke symptoms; Psychiatric: no nervousness, anxiety or depression.    PHYSICAL EXAM:      Pulse: 95       Constitutional: He is oriented to person, place, and time. He appears well-developed and well-nourished. No distress.   HENT:    Head: Normocephalic and atraumatic.   Eyes: Conjunctivae are normal.   Neck: Neck supple.   Cardiovascular: Normal rate.   Pulses:       Femoral pulses are 2+ on the right side, and 1+ on the left side.       Dorsalis pedis pulses are Detected w/ doppler on the right side, and Detected w/ doppler on the left side.        Posterior tibial pulses are Detected w/ doppler on the right side, and Detected w/ doppler on the left side.   Pulmonary/Chest: Effort normal.   Abdominal: Soft. He exhibits no distension and no mass. There is no tenderness. There is no rebound and no guarding. No hernia.   Musculoskeletal: Normal range of motion. He exhibits no edema, tenderness or deformity.   Feet:   Right Foot:   Skin Integrity: Negative for ulcer.   Left Foot:   Skin Integrity: Negative for ulcer.   Neurological: He is alert and oriented to person, place, and time. A sensory deficit is present.   Skin: Capillary refill takes 2 to 3 seconds. No rash noted. He is not diaphoretic.     LAB RESULTS:  No results found for: CBC  Lab Results   Component Value Date    LABPROT 13.4 (H) 09/26/2012    INR 1.3 (H) 09/26/2012     Lab Results   Component Value Date     09/26/2012    K 4.5 09/26/2012    CL 99 09/26/2012    CO2 28 09/26/2012     (H) 09/26/2012    BUN 12 09/26/2012    CREATININE 0.6 09/26/2012    CALCIUM 8.8 09/26/2012    ANIONGAP 11 09/26/2012    EGFRNONAA >60 09/26/2012     Lab Results   Component Value Date    WBC 13.25 (H) 07/02/2019    RBC 4.85 07/02/2019    HGB 14.8 07/02/2019    HCT 47 07/02/2019    MCV 95 07/02/2019    MCH 30.5 07/02/2019    MCHC 32.1 07/02/2019    RDW 14.6 (H) 07/02/2019     07/02/2019    MPV 11.5 07/02/2019    GRAN 8.7 (H) 07/02/2019    GRAN 66.4 07/02/2019    LYMPH 3.4 07/02/2019    LYMPH 25.7 07/02/2019    MONO 1.0 07/02/2019    MONO 7.5 07/02/2019    EOS 0.1 07/02/2019    BASO 0.03 07/02/2019    EOSINOPHIL 0.7 07/02/2019    BASOPHIL 0.2 07/02/2019    DIFFMETHOD Automated  07/02/2019     .No results found for: HGBA1C    IMAGING:  All pertinent imaging has been reviewed and interpreted independently.    CTA and vein mapping reviewed.    IMP/PLAN:  59 y.o. male with   Patient Active Problem List   Diagnosis    PVD (peripheral vascular disease) with claudication    being managed by PCP and specialists who is here today for evaluation of LLE PVD.    -Plan for L EIA angioplasty vs stenting as soon as possible - to preop  -I discussed smoking cessation at length with this patient, including treatment options of nicotine replacement therapy and management of the addiction with assistance by [facility].  The patient states understanding that continued smoking will increase the chances of stenosis progression and other cardiovascular morbidity.    I spent 20 minutes evaluating this patient and greater than 50% of the time was spent counseling, coordinator care and discussing the plan of care.  All questions were answered and patient stated understanding with agreement with the above treatment plan.    Cj Pyle MD [facility]  Vascular and Endovascular Surgery

## 2019-07-30 ENCOUNTER — TELEPHONE (OUTPATIENT)
Dept: VASCULAR SURGERY | Facility: CLINIC | Age: 59
End: 2019-07-30

## 2019-07-30 NOTE — TELEPHONE ENCOUNTER
Spoke to patient and explained that Dr. Pyle was booked on the [facility] for surgery. Explained that he could do his surgery on Aug 7th at [facility] on [facility] in the afternoon. He stated he could make that but he needed to scheduled his transportation. Explained to be at [facility] for 11 on the 7th and if anything changed would contact him. He stated understanding. Gave patient date and time of lab work to be done preop.

## 2019-08-05 ENCOUNTER — HOSPITAL ENCOUNTER (INPATIENT)
Facility: HOSPITAL | Age: 59
LOS: 9 days | Discharge: HOME | DRG: 254 | End: 2019-08-14
Attending: EMERGENCY MEDICINE | Admitting: EMERGENCY MEDICINE
Payer: MEDICAID

## 2019-08-05 DIAGNOSIS — Z72.0 TOBACCO ABUSE: ICD-10-CM

## 2019-08-05 DIAGNOSIS — I73.9 PVD (PERIPHERAL VASCULAR DISEASE) WITH CLAUDICATION: ICD-10-CM

## 2019-08-05 DIAGNOSIS — I10 ESSENTIAL HYPERTENSION: ICD-10-CM

## 2019-08-05 DIAGNOSIS — I99.8 ISCHEMIA OF FOOT: ICD-10-CM

## 2019-08-05 DIAGNOSIS — M79.672 FOOT PAIN, LEFT: ICD-10-CM

## 2019-08-05 DIAGNOSIS — I73.9 CLAUDICATION: ICD-10-CM

## 2019-08-05 DIAGNOSIS — E78.5 HYPERLIPIDEMIA, UNSPECIFIED HYPERLIPIDEMIA TYPE: ICD-10-CM

## 2019-08-05 DIAGNOSIS — E11.51 DM (DIABETES MELLITUS) TYPE II, CONTROLLED, WITH PERIPHERAL VASCULAR DISORDER: ICD-10-CM

## 2019-08-05 DIAGNOSIS — I70.229 CRITICAL LOWER LIMB ISCHEMIA: Primary | ICD-10-CM

## 2019-08-05 PROBLEM — N40.0 BPH (BENIGN PROSTATIC HYPERPLASIA): Status: ACTIVE | Noted: 2019-08-05

## 2019-08-05 LAB
ALBUMIN SERPL BCP-MCNC: 3.2 G/DL (ref 3.5–5.2)
ALP SERPL-CCNC: 113 U/L (ref 55–135)
ALT SERPL W/O P-5'-P-CCNC: 13 U/L (ref 10–44)
ANION GAP SERPL CALC-SCNC: 10 MMOL/L (ref 8–16)
APTT BLDCRRT: 23 SEC (ref 21–32)
APTT BLDCRRT: 23 SEC (ref 21–32)
AST SERPL-CCNC: 12 U/L (ref 10–40)
BASOPHILS # BLD AUTO: 0.03 K/UL (ref 0–0.2)
BASOPHILS NFR BLD: 0.3 % (ref 0–1.9)
BILIRUB SERPL-MCNC: 0.6 MG/DL (ref 0.1–1)
BUN SERPL-MCNC: 11 MG/DL (ref 8–23)
CALCIUM SERPL-MCNC: 9.5 MG/DL (ref 8.7–10.5)
CHLORIDE SERPL-SCNC: 108 MMOL/L (ref 95–110)
CO2 SERPL-SCNC: 25 MMOL/L (ref 23–29)
CREAT SERPL-MCNC: 0.6 MG/DL (ref 0.5–1.4)
DIFFERENTIAL METHOD: ABNORMAL
EOSINOPHIL # BLD AUTO: 0.1 K/UL (ref 0–0.5)
EOSINOPHIL NFR BLD: 1.1 % (ref 0–8)
ERYTHROCYTE [DISTWIDTH] IN BLOOD BY AUTOMATED COUNT: 14.2 % (ref 11.5–14.5)
EST. GFR  (AFRICAN AMERICAN): >60 ML/MIN/1.73 M^2
EST. GFR  (NON AFRICAN AMERICAN): >60 ML/MIN/1.73 M^2
GLUCOSE SERPL-MCNC: 63 MG/DL (ref 70–110)
HCT VFR BLD AUTO: 38.6 % (ref 40–54)
HGB BLD-MCNC: 12.6 G/DL (ref 14–18)
INR PPP: 1 (ref 0.8–1.2)
INR PPP: 1 (ref 0.8–1.2)
LACTATE SERPL-SCNC: 0.9 MMOL/L (ref 0.5–2.2)
LYMPHOCYTES # BLD AUTO: 2.7 K/UL (ref 1–4.8)
LYMPHOCYTES NFR BLD: 26.4 % (ref 18–48)
MCH RBC QN AUTO: 30 PG (ref 27–31)
MCHC RBC AUTO-ENTMCNC: 32.6 G/DL (ref 32–36)
MCV RBC AUTO: 92 FL (ref 82–98)
MONOCYTES # BLD AUTO: 1.2 K/UL (ref 0.3–1)
MONOCYTES NFR BLD: 11.8 % (ref 4–15)
NEUTROPHILS # BLD AUTO: 6.2 K/UL (ref 1.8–7.7)
NEUTROPHILS NFR BLD: 60.7 % (ref 38–73)
PLATELET # BLD AUTO: 131 K/UL (ref 150–350)
PLATELET BLD QL SMEAR: ABNORMAL
PMV BLD AUTO: 12.1 FL (ref 9.2–12.9)
POCT GLUCOSE: 109 MG/DL (ref 70–110)
POCT GLUCOSE: 149 MG/DL (ref 70–110)
POCT GLUCOSE: 48 MG/DL (ref 70–110)
POTASSIUM SERPL-SCNC: 3.6 MMOL/L (ref 3.5–5.1)
PROT SERPL-MCNC: 6.6 G/DL (ref 6–8.4)
PROTHROMBIN TIME: 10.3 SEC (ref 9–12.5)
PROTHROMBIN TIME: 10.3 SEC (ref 9–12.5)
RBC # BLD AUTO: 4.2 M/UL (ref 4.6–6.2)
SODIUM SERPL-SCNC: 143 MMOL/L (ref 136–145)
WBC # BLD AUTO: 10.18 K/UL (ref 3.9–12.7)

## 2019-08-05 PROCEDURE — 99285 EMERGENCY DEPT VISIT HI MDM: CPT

## 2019-08-05 PROCEDURE — 80053 COMPREHEN METABOLIC PANEL: CPT

## 2019-08-05 PROCEDURE — 63600175 PHARM REV CODE 636 W HCPCS: Performed by: EMERGENCY MEDICINE

## 2019-08-05 PROCEDURE — 99232 PR SUBSEQUENT HOSPITAL CARE,LEVL II: ICD-10-PCS | Mod: ,,, | Performed by: SURGERY

## 2019-08-05 PROCEDURE — 85730 THROMBOPLASTIN TIME PARTIAL: CPT | Mod: 91

## 2019-08-05 PROCEDURE — 25000003 PHARM REV CODE 250: Performed by: EMERGENCY MEDICINE

## 2019-08-05 PROCEDURE — 25000003 PHARM REV CODE 250: Performed by: HOSPITALIST

## 2019-08-05 PROCEDURE — 99232 SBSQ HOSP IP/OBS MODERATE 35: CPT | Mod: ,,, | Performed by: SURGERY

## 2019-08-05 PROCEDURE — 11000001 HC ACUTE MED/SURG PRIVATE ROOM

## 2019-08-05 PROCEDURE — 85610 PROTHROMBIN TIME: CPT | Mod: 91

## 2019-08-05 PROCEDURE — 83605 ASSAY OF LACTIC ACID: CPT

## 2019-08-05 PROCEDURE — 85025 COMPLETE CBC W/AUTO DIFF WBC: CPT | Mod: 91

## 2019-08-05 RX ORDER — PANTOPRAZOLE SODIUM 40 MG/1
40 TABLET, DELAYED RELEASE ORAL DAILY
Status: DISCONTINUED | OUTPATIENT
Start: 2019-08-05 | End: 2019-08-14 | Stop reason: HOSPADM

## 2019-08-05 RX ORDER — HYDROMORPHONE HYDROCHLORIDE 2 MG/ML
0.5 INJECTION, SOLUTION INTRAMUSCULAR; INTRAVENOUS; SUBCUTANEOUS
Status: COMPLETED | OUTPATIENT
Start: 2019-08-05 | End: 2019-08-05

## 2019-08-05 RX ORDER — PRAVASTATIN SODIUM 40 MG/1
40 TABLET ORAL NIGHTLY
Status: DISCONTINUED | OUTPATIENT
Start: 2019-08-05 | End: 2019-08-14 | Stop reason: HOSPADM

## 2019-08-05 RX ORDER — CLONIDINE HYDROCHLORIDE 0.1 MG/1
0.1 TABLET ORAL EVERY 4 HOURS PRN
Status: DISCONTINUED | OUTPATIENT
Start: 2019-08-05 | End: 2019-08-14 | Stop reason: HOSPADM

## 2019-08-05 RX ORDER — OXYCODONE HYDROCHLORIDE 5 MG/1
5 TABLET ORAL EVERY 4 HOURS PRN
Status: DISCONTINUED | OUTPATIENT
Start: 2019-08-05 | End: 2019-08-07

## 2019-08-05 RX ORDER — INSULIN ASPART 100 [IU]/ML
0-5 INJECTION, SOLUTION INTRAVENOUS; SUBCUTANEOUS EVERY 6 HOURS PRN
Status: DISCONTINUED | OUTPATIENT
Start: 2019-08-05 | End: 2019-08-07

## 2019-08-05 RX ORDER — ACETAMINOPHEN 325 MG/1
650 TABLET ORAL EVERY 8 HOURS PRN
Status: DISCONTINUED | OUTPATIENT
Start: 2019-08-05 | End: 2019-08-14 | Stop reason: HOSPADM

## 2019-08-05 RX ORDER — ONDANSETRON 8 MG/1
8 TABLET, ORALLY DISINTEGRATING ORAL EVERY 8 HOURS PRN
Status: DISCONTINUED | OUTPATIENT
Start: 2019-08-05 | End: 2019-08-14 | Stop reason: HOSPADM

## 2019-08-05 RX ORDER — TAMSULOSIN HYDROCHLORIDE 0.4 MG/1
0.4 CAPSULE ORAL DAILY
Status: DISCONTINUED | OUTPATIENT
Start: 2019-08-06 | End: 2019-08-14 | Stop reason: HOSPADM

## 2019-08-05 RX ORDER — GLUCAGON 1 MG
1 KIT INJECTION
Status: DISCONTINUED | OUTPATIENT
Start: 2019-08-05 | End: 2019-08-14 | Stop reason: HOSPADM

## 2019-08-05 RX ORDER — HYDROMORPHONE HYDROCHLORIDE 2 MG/ML
0.5 INJECTION, SOLUTION INTRAMUSCULAR; INTRAVENOUS; SUBCUTANEOUS EVERY 4 HOURS PRN
Status: DISCONTINUED | OUTPATIENT
Start: 2019-08-05 | End: 2019-08-07

## 2019-08-05 RX ORDER — SODIUM CHLORIDE 9 MG/ML
INJECTION, SOLUTION INTRAVENOUS CONTINUOUS
Status: DISCONTINUED | OUTPATIENT
Start: 2019-08-06 | End: 2019-08-11

## 2019-08-05 RX ORDER — HEPARIN SODIUM,PORCINE/D5W 25000/250
12 INTRAVENOUS SOLUTION INTRAVENOUS CONTINUOUS
Status: DISCONTINUED | OUTPATIENT
Start: 2019-08-05 | End: 2019-08-06

## 2019-08-05 RX ORDER — AMLODIPINE BESYLATE 5 MG/1
10 TABLET ORAL DAILY
Status: DISCONTINUED | OUTPATIENT
Start: 2019-08-06 | End: 2019-08-14 | Stop reason: HOSPADM

## 2019-08-05 RX ORDER — SODIUM CHLORIDE 0.9 % (FLUSH) 0.9 %
10 SYRINGE (ML) INJECTION
Status: DISCONTINUED | OUTPATIENT
Start: 2019-08-05 | End: 2019-08-14 | Stop reason: HOSPADM

## 2019-08-05 RX ORDER — GABAPENTIN 300 MG/1
300 CAPSULE ORAL 3 TIMES DAILY
Status: DISCONTINUED | OUTPATIENT
Start: 2019-08-05 | End: 2019-08-14 | Stop reason: HOSPADM

## 2019-08-05 RX ADMIN — OXYCODONE HYDROCHLORIDE 5 MG: 5 TABLET ORAL at 06:08

## 2019-08-05 RX ADMIN — HEPARIN SODIUM AND DEXTROSE 12 UNITS/KG/HR: 10000; 5 INJECTION INTRAVENOUS at 08:08

## 2019-08-05 RX ADMIN — GABAPENTIN 300 MG: 300 CAPSULE ORAL at 08:08

## 2019-08-05 RX ADMIN — PRAVASTATIN SODIUM 40 MG: 40 TABLET ORAL at 08:08

## 2019-08-05 RX ADMIN — HYDROMORPHONE HYDROCHLORIDE 0.5 MG: 2 INJECTION, SOLUTION INTRAMUSCULAR; INTRAVENOUS; SUBCUTANEOUS at 04:08

## 2019-08-05 RX ADMIN — OXYCODONE HYDROCHLORIDE 5 MG: 5 TABLET ORAL at 10:08

## 2019-08-05 RX ADMIN — PANTOPRAZOLE SODIUM 40 MG: 40 TABLET, DELAYED RELEASE ORAL at 06:08

## 2019-08-05 NOTE — SUBJECTIVE & OBJECTIVE
Past Medical History:   Diagnosis Date    Cigarette smoker     Diabetes mellitus     History of alcohol abuse     Hyperlipidemia     Hypertension     PVD (peripheral vascular disease)     Seizures        Past Surgical History:   Procedure Laterality Date    BRAIN SURGERY      patient states that he had surgery for seizures       Review of patient's allergies indicates:  No Known Allergies    No current facility-administered medications on file prior to encounter.      Current Outpatient Medications on File Prior to Encounter   Medication Sig    amLODIPine (NORVASC) 5 MG tablet Take 5 mg by mouth once daily.    dulaglutide (TRULICITY) 0.75 mg/0.5 mL PnIj Inject 0.75 mg into the skin every 7 days.    ergocalciferol (VITAMIN D2) 50,000 unit Cap Take 50,000 Units by mouth every 7 days.    gabapentin (NEURONTIN) 300 MG capsule Take 300 mg by mouth.    latanoprost 0.005 % dpem Apply to eye.    metFORMIN (GLUCOPHAGE) 500 MG tablet Take 1,000 mg by mouth 2 (two) times daily with meals.     pravastatin (PRAVACHOL) 40 MG tablet Take 40 mg by mouth once daily.    tamsulosin (FLOMAX) 0.4 mg Cap Take 0.4 mg by mouth once daily.     Family History     None        Tobacco Use    Smoking status: Current Every Day Smoker     Packs/day: 1.50    Smokeless tobacco: Never Used   Substance and Sexual Activity    Alcohol use: Not Currently     Comment: History of abuse    Drug use: Not Currently    Sexual activity: Not on file     Review of Systems   Constitutional: Positive for activity change and appetite change.   HENT: Negative for congestion.    Eyes: Negative for discharge and itching.   Respiratory: Negative for chest tightness.    Cardiovascular: Negative for chest pain and leg swelling.   Gastrointestinal: Negative for abdominal distention and abdominal pain.   Endocrine: Negative for polydipsia.   Genitourinary: Negative for difficulty urinating and dysuria.   Musculoskeletal: Positive for myalgias.   Skin:  Positive for color change and pallor.   Allergic/Immunologic: Negative for environmental allergies and food allergies.   Neurological: Negative for dizziness and facial asymmetry.   Hematological: Negative for adenopathy. Does not bruise/bleed easily.   Psychiatric/Behavioral: Negative for agitation and behavioral problems.     Objective:     Vital Signs (Most Recent):  Temp: 98.7 °F (37.1 °C) (08/05/19 1650)  Pulse: 95 (08/05/19 1601)  Resp: 18 (08/05/19 1411)  BP: (!) 158/67 (08/05/19 1601)  SpO2: 99 % (08/05/19 1601) Vital Signs (24h Range):  Temp:  [98.2 °F (36.8 °C)-98.7 °F (37.1 °C)] 98.7 °F (37.1 °C)  Pulse:  [] 95  Resp:  [18] 18  SpO2:  [96 %-99 %] 99 %  BP: (134-158)/(58-68) 158/67     Weight: 54.4 kg (120 lb)  Body mass index is 19.97 kg/m².    Physical Exam   Constitutional: He is oriented to person, place, and time. No distress.   HENT:   Head: Atraumatic.   Eyes: Pupils are equal, round, and reactive to light. EOM are normal.   Neck: Normal range of motion. Neck supple.   Cardiovascular: Normal rate and regular rhythm.   Pulmonary/Chest: Effort normal and breath sounds normal.   Abdominal: Soft.   Musculoskeletal: Normal range of motion. He exhibits tenderness. He exhibits no deformity.   Cold left leg,no palpable pulse.   Neurological: He is oriented to person, place, and time. No cranial nerve deficit. Coordination normal.   Skin: Skin is warm. There is pallor.   Psychiatric: He has a normal mood and affect. His behavior is normal.         CRANIAL NERVES     CN III, IV, VI   Pupils are equal, round, and reactive to light.  Extraocular motions are normal.        Significant Labs:   BMP:   Recent Labs   Lab 08/05/19  0922         K 3.9      CO2 30*   BUN 11   CREATININE 0.7   CALCIUM 10.4   MG 1.9     CBC:   Recent Labs   Lab 08/05/19 0922   WBC 13.46*   HGB 14.7   HCT 45.7

## 2019-08-05 NOTE — HPI
This is a 59 y.o male patient, with a PMHx of diabetes mellitus, hypertension, and hyperlipidemia, presenting to the ED with a complaint of 10/10, sharp, chronic, left ankle and foot pain, numbness, and swelling, that began a few months ago, and worsened x4 days ago. He reports the pain at rest began yesterday. Patient states the pain is worse with movement. Patient reports he's having a surgery in x2 days on his left leg due to his atherosclerosis by , . Patient denies any fever, chills, shortness of breath, chest pain, neck pain, back pain, abdominal pain, rash, headaches, congestion, rhinorrhea, cough, sore throat, ear pain, eye pain, blurred vision, nausea, vomiting, diarrhea, dysuria, or any other associated symptoms. No prior Tx.. He reports he stopped smoking cigarettes x1 week ago. evaluated patient in ER recommending heparin drip,US arterial and X ray and plan for revascularization AM.

## 2019-08-05 NOTE — ED PROVIDER NOTES
"Encounter Date: 8/5/2019    SCRIBE #1 NOTE: I, Lela Wolf, am scribing for, and in the presence of,  Adrien Odell MD. I have scribed the entire note.       History     Chief Complaint   Patient presents with    Leg Swelling     Pt reports chronic Left leg swelling x last few months. Pt reports no OTC pain medication taken, but "unable to ambulate at home in the last week due to pain."     CC: Ankle pain    HPI: This is a 59 y.o male patient, with a PMHx of diabetes mellitus, hypertension, and hyperlipidemia, presenting to the ED with a complaint of 10/10, sharp, chronic, left ankle and foot pain, numbness, and swelling, that began a few months ago, and worsened x4 days ago. He reports the pain at rest began yesterday. Patient states the pain is worse with movement. Patient reports he's having a surgery in x2 days on his left leg due to his atherosclerosis . Patient denies any fever, chills, shortness of breath, chest pain, neck pain, back pain, abdominal pain, rash, headaches, congestion, rhinorrhea, cough, sore throat, ear pain, eye pain, blurred vision, nausea, vomiting, diarrhea, dysuria, or any other associated symptoms. No prior Tx. No known drug allergies. He reports he stopped smoking cigarettes x1 week ago.      The history is provided by the patient. No  was used.     Review of patient's allergies indicates:  No Known Allergies  Past Medical History:   Diagnosis Date    Cigarette smoker     Diabetes mellitus     History of alcohol abuse     Hyperlipidemia     Hypertension     PVD (peripheral vascular disease)     Seizures      Past Surgical History:   Procedure Laterality Date    BRAIN SURGERY      patient states that he had surgery for seizures     History reviewed. No pertinent family history.  Social History     Tobacco Use    Smoking status: Current Every Day Smoker     Packs/day: 1.50    Smokeless tobacco: Never Used   Substance Use Topics    Alcohol use: Not " Currently     Comment: History of abuse    Drug use: Not Currently     Review of Systems   Constitutional: Negative for chills and fever.   HENT: Negative for congestion, ear pain, rhinorrhea and sore throat.    Eyes: Negative for pain and visual disturbance.   Respiratory: Negative for cough and shortness of breath.    Cardiovascular: Negative for chest pain.   Gastrointestinal: Negative for abdominal pain, diarrhea, nausea and vomiting.   Genitourinary: Negative for dysuria.   Musculoskeletal: Positive for arthralgias (left ankle and foot) and joint swelling (left ankle and foot). Negative for back pain and neck pain.   Skin: Negative for rash.   Neurological: Positive for numbness (left foot). Negative for headaches.       Physical Exam     Initial Vitals [08/05/19 1411]   BP Pulse Resp Temp SpO2   (!) 145/66 99 18 98.2 °F (36.8 °C) 97 %      MAP       --         Physical Exam    Nursing note and vitals reviewed.  Constitutional: He appears well-developed and well-nourished.   HENT:   Head: Normocephalic and atraumatic.   Eyes: EOM are normal. Pupils are equal, round, and reactive to light.   Neck: Full passive range of motion without pain. Neck supple. No thyromegaly present. No JVD present.   Cardiovascular: Normal rate and regular rhythm. Exam reveals no gallop and no friction rub.    No murmur heard.  Pulmonary/Chest: Breath sounds normal. No respiratory distress.   Abdominal: Soft. Bowel sounds are normal. There is no tenderness.   Musculoskeletal: He exhibits edema and tenderness.   Cool, mildly dusky left foot .No palpable pulse. No evidence of gangrene. Mild edema. TTP. Cap refill greater than 2 seconds. Unable to doppler pulse.    Neurological: He is alert and oriented to person, place, and time. He has normal strength. GCS score is 15. GCS eye subscore is 4. GCS verbal subscore is 5. GCS motor subscore is 6.   Skin: Skin is warm and dry. Capillary refill takes 2 to 3 seconds.         ED Course    Procedures  Labs Reviewed   CBC W/ AUTO DIFFERENTIAL   COMPREHENSIVE METABOLIC PANEL   LACTIC ACID, PLASMA   PROTIME-INR   APTT   APTT   PROTIME-INR   CBC W/ AUTO DIFFERENTIAL          Imaging Results          X-Ray Foot Complete Left (In process)                US Lower Extremity Arteries Left (In process)                  Medical Decision Making:   History:   Old Medical Records: I decided to obtain old medical records.  Initial Assessment:   Jerry Galeana is a 59 y.o. male presenting for an emergent evaluation of10/10, sharp, chronic, left ankle and foot pain, numbness, and swelling . Exam revealed cool, mildly dusky left foot with no palpable pulse. Will obtain labs, and given the extent of pain, imaging. I will treat the pt's symptoms appropriately and reassess.  Clinical Tests:   Lab Tests: Ordered and Reviewed  Radiological Study: Reviewed and Ordered  ED Management:  2:54 PM  Consulted with Dr. Pyle.  4:17 PM  Consulted with Dr. Means.     Dr. Pyle with vascular surgery came in the emergency room to evaluate the patient.  He recommends admitting the patient on a heparin drip using the ACS protocol and he will perform surgery on the patient tomorrow afternoon.  Discussed with Dr. Means who accepted the patient.  The vascular surgeon also requested an arterial ultrasound now the left lower extremity as well as x-ray of the left foot.                     Clinical Impression:       ICD-10-CM ICD-9-CM   1. PVD (peripheral vascular disease) with claudication I73.9 443.9   2. Claudication I73.9 443.9   3. Ischemia of foot I99.8 459.9   4. Foot pain, left M79.672 729.5                 Scribe attestation: I, Adrien Odell, personally performed the services described in this documentation. All medical record entries made by the scribe were at my direction and in my presence.  I have reviewed the chart and agree that the record reflects my personal performance and is accurate and complete.               Adrien  THERESA Odell MD  08/05/19 6427

## 2019-08-05 NOTE — H&P
"[facility] Medicine  History & Physical    Patient Name: Jerry Galeana  MRN: [id number]  Admission Date: 8/5/2019  Attending Physician: Mile Zavala    Primary Care Provider: Babak Bryan MD         Patient information was obtained from patient and ER records.     Subjective:     Principal Problem:PVD (peripheral vascular disease) with claudication    Chief Complaint:   Chief Complaint   Patient presents with    Leg Swelling     Pt reports chronic Left leg swelling x last few months. Pt reports no OTC pain medication taken, but "unable to ambulate at home in the last week due to pain."        HPI: This is a 59 y.o male patient, with a PMHx of diabetes mellitus, hypertension, and hyperlipidemia, presenting to the ED with a complaint of 10/10, sharp, chronic, left ankle and foot pain, numbness, and swelling, that began a few months ago, and worsened x4 days ago. He reports the pain at rest began yesterday. Patient states the pain is worse with movement. Patient reports he's having a surgery in x2 days on his left leg due to his atherosclerosis by , . Patient denies any fever, chills, shortness of breath, chest pain, neck pain, back pain, abdominal pain, rash, headaches, congestion, rhinorrhea, cough, sore throat, ear pain, eye pain, blurred vision, nausea, vomiting, diarrhea, dysuria, or any other associated symptoms. No prior Tx.. He reports he stopped smoking cigarettes x1 week ago. evaluated patient in ER recommending heparin drip,US arterial and X ray and plan for revascularization AM.    Past Medical History:   Diagnosis Date    Cigarette smoker     Diabetes mellitus     History of alcohol abuse     Hyperlipidemia     Hypertension     PVD (peripheral vascular disease)     Seizures        Past Surgical History:   Procedure Laterality Date    BRAIN SURGERY      patient states that he had surgery for seizures       Review of patient's allergies " indicates:  No Known Allergies    No current facility-administered medications on file prior to encounter.      Current Outpatient Medications on File Prior to Encounter   Medication Sig    amLODIPine (NORVASC) 5 MG tablet Take 5 mg by mouth once daily.    dulaglutide (TRULICITY) 0.75 mg/0.5 mL PnIj Inject 0.75 mg into the skin every 7 days.    ergocalciferol (VITAMIN D2) 50,000 unit Cap Take 50,000 Units by mouth every 7 days.    gabapentin (NEURONTIN) 300 MG capsule Take 300 mg by mouth.    latanoprost 0.005 % dpem Apply to eye.    metFORMIN (GLUCOPHAGE) 500 MG tablet Take 1,000 mg by mouth 2 (two) times daily with meals.     pravastatin (PRAVACHOL) 40 MG tablet Take 40 mg by mouth once daily.    tamsulosin (FLOMAX) 0.4 mg Cap Take 0.4 mg by mouth once daily.     Family History     None        Tobacco Use    Smoking status: Current Every Day Smoker     Packs/day: 1.50    Smokeless tobacco: Never Used   Substance and Sexual Activity    Alcohol use: Not Currently     Comment: History of abuse    Drug use: Not Currently    Sexual activity: Not on file     Review of Systems   Constitutional: Positive for activity change and appetite change.   HENT: Negative for congestion.    Eyes: Negative for discharge and itching.   Respiratory: Negative for chest tightness.    Cardiovascular: Negative for chest pain and leg swelling.   Gastrointestinal: Negative for abdominal distention and abdominal pain.   Endocrine: Negative for polydipsia.   Genitourinary: Negative for difficulty urinating and dysuria.   Musculoskeletal: Positive for myalgias.   Skin: Positive for color change and pallor.   Allergic/Immunologic: Negative for environmental allergies and food allergies.   Neurological: Negative for dizziness and facial asymmetry.   Hematological: Negative for adenopathy. Does not bruise/bleed easily.   Psychiatric/Behavioral: Negative for agitation and behavioral problems.     Objective:     Vital Signs (Most  Recent):  Temp: 98.7 °F (37.1 °C) (08/05/19 1650)  Pulse: 95 (08/05/19 1601)  Resp: 18 (08/05/19 1411)  BP: (!) 158/67 (08/05/19 1601)  SpO2: 99 % (08/05/19 1601) Vital Signs (24h Range):  Temp:  [98.2 °F (36.8 °C)-98.7 °F (37.1 °C)] 98.7 °F (37.1 °C)  Pulse:  [] 95  Resp:  [18] 18  SpO2:  [96 %-99 %] 99 %  BP: (134-158)/(58-68) 158/67     Weight: 54.4 kg (120 lb)  Body mass index is 19.97 kg/m².    Physical Exam   Constitutional: He is oriented to person, place, and time. No distress.   HENT:   Head: Atraumatic.   Eyes: Pupils are equal, round, and reactive to light. EOM are normal.   Neck: Normal range of motion. Neck supple.   Cardiovascular: Normal rate and regular rhythm.   Pulmonary/Chest: Effort normal and breath sounds normal.   Abdominal: Soft.   Musculoskeletal: Normal range of motion. He exhibits tenderness. He exhibits no deformity.   Cold left leg,no palpable pulse.   Neurological: He is oriented to person, place, and time. No cranial nerve deficit. Coordination normal.   Skin: Skin is warm. There is pallor.   Psychiatric: He has a normal mood and affect. His behavior is normal.         CRANIAL NERVES     CN III, IV, VI   Pupils are equal, round, and reactive to light.  Extraocular motions are normal.        Significant Labs:   BMP:   Recent Labs   Lab 08/05/19  0922         K 3.9      CO2 30*   BUN 11   CREATININE 0.7   CALCIUM 10.4   MG 1.9     CBC:   Recent Labs   Lab 08/05/19  0922   WBC 13.46*   HGB 14.7   HCT 45.7              Assessment/Plan:     * PVD (peripheral vascular disease) with claudication  Patient with severe PVD,symtomatic,claudication,with no palpable pulse,seen by vascular and recommending heparin drip and revascularization plan in AM.      Hyperlipidemia  On statin.      BPH (benign prostatic hyperplasia)  Resume Flomax,      DM (diabetes mellitus) type II, controlled, with peripheral vascular disorder  Will be on SSI.      Essential  hypertension  Resume home medication Norvasc and added prn clonidine.        VTE Risk Mitigation (From admission, onward)        Ordered     heparin 25,000 units in dextrose 5% (100 units/ml) IV bolus from bag INITIAL BOLUS  Once      08/05/19 1612     heparin 25,000 units in dextrose 5% 250 mL (100 units/mL) infusion LOW INTENSITY nomogram - [facility]  Continuous      08/05/19 1612     heparin 25,000 units in dextrose 5% (100 units/ml) IV bolus from bag - ADDITIONAL PRN BOLUS - 60 units/kg  As needed (PRN)      08/05/19 1612     heparin 25,000 units in dextrose 5% (100 units/ml) IV bolus from bag - ADDITIONAL PRN BOLUS - 30 units/kg  As needed (PRN)      08/05/19 1612             Mile Zavala MD  Department of Hospital Medicine   [facility]

## 2019-08-05 NOTE — ASSESSMENT & PLAN NOTE
Patient with severe PVD,symtomatic,claudication,with no palpable pulse,seen by vascular and recommending heparin drip and revascularization plan in AM.

## 2019-08-05 NOTE — NURSING
Pt arrived to unit with BS of 48, two orange juices , sandwich and crackers provided. BS now 108. He is aaox4, awaiting for heparin drip to be delivered by pharmacy. PO pain med given to pt.

## 2019-08-05 NOTE — ED TRIAGE NOTES
Pt reports left foot and ankle pain. States he scheduled for a surgery on that foot for Wednesday. Pt believes they are going to place a stent in his leg. Pt. Unable to bear weight on the left foot.

## 2019-08-05 NOTE — PROGRESS NOTES
Patient seen and examined in the emergency department. Patient with known left iliac occlusion and evidence of chronic vascular disease. He Has developed ischemic rest pain and would benefit from admission to the hospital for a heparin drip and revascularization tomorrow. He would also benefit from a left foot x-ray and further evaluation of his leukocytosis. Full consult note to follow.

## 2019-08-06 LAB
ANION GAP SERPL CALC-SCNC: 6 MMOL/L (ref 8–16)
APTT BLDCRRT: 33.6 SEC (ref 21–32)
BASOPHILS # BLD AUTO: 0.03 K/UL (ref 0–0.2)
BASOPHILS NFR BLD: 0.3 % (ref 0–1.9)
BUN SERPL-MCNC: 11 MG/DL (ref 8–23)
CALCIUM SERPL-MCNC: 9.3 MG/DL (ref 8.7–10.5)
CHLORIDE SERPL-SCNC: 106 MMOL/L (ref 95–110)
CO2 SERPL-SCNC: 29 MMOL/L (ref 23–29)
CREAT SERPL-MCNC: 0.6 MG/DL (ref 0.5–1.4)
DIFFERENTIAL METHOD: ABNORMAL
EOSINOPHIL # BLD AUTO: 0.2 K/UL (ref 0–0.5)
EOSINOPHIL NFR BLD: 1.8 % (ref 0–8)
ERYTHROCYTE [DISTWIDTH] IN BLOOD BY AUTOMATED COUNT: 14.5 % (ref 11.5–14.5)
EST. GFR  (AFRICAN AMERICAN): >60 ML/MIN/1.73 M^2
EST. GFR  (NON AFRICAN AMERICAN): >60 ML/MIN/1.73 M^2
GLUCOSE SERPL-MCNC: 107 MG/DL (ref 70–110)
HCT VFR BLD AUTO: 42.7 % (ref 40–54)
HGB BLD-MCNC: 13.4 G/DL (ref 14–18)
LYMPHOCYTES # BLD AUTO: 4.6 K/UL (ref 1–4.8)
LYMPHOCYTES NFR BLD: 38.2 % (ref 18–48)
MCH RBC QN AUTO: 29.1 PG (ref 27–31)
MCHC RBC AUTO-ENTMCNC: 31.4 G/DL (ref 32–36)
MCV RBC AUTO: 93 FL (ref 82–98)
MONOCYTES # BLD AUTO: 1.5 K/UL (ref 0.3–1)
MONOCYTES NFR BLD: 12.2 % (ref 4–15)
NEUTROPHILS # BLD AUTO: 5.7 K/UL (ref 1.8–7.7)
NEUTROPHILS NFR BLD: 47.5 % (ref 38–73)
PLATELET # BLD AUTO: 318 K/UL (ref 150–350)
PLATELET BLD QL SMEAR: ABNORMAL
PMV BLD AUTO: 11.4 FL (ref 9.2–12.9)
POCT GLUCOSE: 87 MG/DL (ref 70–110)
POCT GLUCOSE: 95 MG/DL (ref 70–110)
POTASSIUM SERPL-SCNC: 3.8 MMOL/L (ref 3.5–5.1)
RBC # BLD AUTO: 4.61 M/UL (ref 4.6–6.2)
SODIUM SERPL-SCNC: 141 MMOL/L (ref 136–145)
WBC # BLD AUTO: 11.99 K/UL (ref 3.9–12.7)

## 2019-08-06 PROCEDURE — 37223 PR REVASCULARIZE ILIAC ARTERY,ANGIOPLASTY/STENT, EA ADD VESSEL: ICD-10-PCS | Mod: LT,,, | Performed by: SURGERY

## 2019-08-06 PROCEDURE — 25000003 PHARM REV CODE 250: Performed by: EMERGENCY MEDICINE

## 2019-08-06 PROCEDURE — 25500020 PHARM REV CODE 255: Performed by: HOSPITALIST

## 2019-08-06 PROCEDURE — 85730 THROMBOPLASTIN TIME PARTIAL: CPT

## 2019-08-06 PROCEDURE — 25000003 PHARM REV CODE 250: Performed by: HOSPITALIST

## 2019-08-06 PROCEDURE — 99152 MOD SED SAME PHYS/QHP 5/>YRS: CPT | Mod: ,,, | Performed by: SURGERY

## 2019-08-06 PROCEDURE — 80048 BASIC METABOLIC PNL TOTAL CA: CPT

## 2019-08-06 PROCEDURE — 63600175 PHARM REV CODE 636 W HCPCS: Performed by: EMERGENCY MEDICINE

## 2019-08-06 PROCEDURE — 37221 PR REVASCULARIZE ILIAC ARTERY,ANGIOPLASTY/STENT, INITIAL VESSEL: CPT | Mod: LT,,, | Performed by: SURGERY

## 2019-08-06 PROCEDURE — 25000003 PHARM REV CODE 250: Performed by: SURGERY

## 2019-08-06 PROCEDURE — 94761 N-INVAS EAR/PLS OXIMETRY MLT: CPT

## 2019-08-06 PROCEDURE — 37223 PR REVASCULARIZE ILIAC ARTERY,ANGIOPLASTY/STENT, EA ADD VESSEL: CPT | Mod: LT,,, | Performed by: SURGERY

## 2019-08-06 PROCEDURE — 11000001 HC ACUTE MED/SURG PRIVATE ROOM

## 2019-08-06 PROCEDURE — 37221 PR REVASCULARIZE ILIAC ARTERY,ANGIOPLASTY/STENT, INITIAL VESSEL: ICD-10-PCS | Mod: LT,,, | Performed by: SURGERY

## 2019-08-06 PROCEDURE — 99152 PR MOD CONSCIOUS SEDATION, SAME PHYS, 5+ YRS, FIRST 15 MIN: ICD-10-PCS | Mod: ,,, | Performed by: SURGERY

## 2019-08-06 PROCEDURE — 85025 COMPLETE CBC W/AUTO DIFF WBC: CPT

## 2019-08-06 PROCEDURE — 63600175 PHARM REV CODE 636 W HCPCS: Performed by: SURGERY

## 2019-08-06 PROCEDURE — 36415 COLL VENOUS BLD VENIPUNCTURE: CPT

## 2019-08-06 RX ORDER — CLOPIDOGREL BISULFATE 75 MG/1
75 TABLET ORAL DAILY
Status: DISCONTINUED | OUTPATIENT
Start: 2019-08-07 | End: 2019-08-14 | Stop reason: HOSPADM

## 2019-08-06 RX ORDER — PROTAMINE SULFATE 10 MG/ML
INJECTION, SOLUTION INTRAVENOUS CODE/TRAUMA/SEDATION MEDICATION
Status: COMPLETED | OUTPATIENT
Start: 2019-08-06 | End: 2019-08-06

## 2019-08-06 RX ORDER — CLOPIDOGREL BISULFATE 75 MG/1
300 TABLET ORAL ONCE
Status: DISCONTINUED | OUTPATIENT
Start: 2019-08-06 | End: 2019-08-06

## 2019-08-06 RX ORDER — HEPARIN SODIUM 1000 [USP'U]/ML
INJECTION, SOLUTION INTRAVENOUS; SUBCUTANEOUS CODE/TRAUMA/SEDATION MEDICATION
Status: COMPLETED | OUTPATIENT
Start: 2019-08-06 | End: 2019-08-06

## 2019-08-06 RX ORDER — SODIUM CHLORIDE 9 MG/ML
INJECTION, SOLUTION INTRAVENOUS
Status: COMPLETED | OUTPATIENT
Start: 2019-08-06 | End: 2019-08-06

## 2019-08-06 RX ORDER — FENTANYL CITRATE 50 UG/ML
INJECTION, SOLUTION INTRAMUSCULAR; INTRAVENOUS CODE/TRAUMA/SEDATION MEDICATION
Status: COMPLETED | OUTPATIENT
Start: 2019-08-06 | End: 2019-08-06

## 2019-08-06 RX ORDER — MIDAZOLAM HYDROCHLORIDE 1 MG/ML
INJECTION INTRAMUSCULAR; INTRAVENOUS CODE/TRAUMA/SEDATION MEDICATION
Status: COMPLETED | OUTPATIENT
Start: 2019-08-06 | End: 2019-08-06

## 2019-08-06 RX ORDER — CEFAZOLIN SODIUM 1 G/50ML
SOLUTION INTRAVENOUS
Status: COMPLETED | OUTPATIENT
Start: 2019-08-06 | End: 2019-08-06

## 2019-08-06 RX ORDER — NAPROXEN SODIUM 220 MG/1
81 TABLET, FILM COATED ORAL DAILY
Status: DISCONTINUED | OUTPATIENT
Start: 2019-08-07 | End: 2019-08-14 | Stop reason: HOSPADM

## 2019-08-06 RX ORDER — CLOPIDOGREL BISULFATE 75 MG/1
300 TABLET ORAL ONCE
Status: COMPLETED | OUTPATIENT
Start: 2019-08-06 | End: 2019-08-06

## 2019-08-06 RX ADMIN — HEPARIN SODIUM 5000 UNITS: 1000 INJECTION, SOLUTION INTRAVENOUS; SUBCUTANEOUS at 05:08

## 2019-08-06 RX ADMIN — IOHEXOL 50 ML: 300 INJECTION, SOLUTION INTRAVENOUS at 06:08

## 2019-08-06 RX ADMIN — GABAPENTIN 300 MG: 300 CAPSULE ORAL at 02:08

## 2019-08-06 RX ADMIN — PRAVASTATIN SODIUM 40 MG: 40 TABLET ORAL at 08:08

## 2019-08-06 RX ADMIN — GABAPENTIN 300 MG: 300 CAPSULE ORAL at 09:08

## 2019-08-06 RX ADMIN — TAMSULOSIN HYDROCHLORIDE 0.4 MG: 0.4 CAPSULE ORAL at 09:08

## 2019-08-06 RX ADMIN — CEFAZOLIN SODIUM 2 G: 1 INJECTION, SOLUTION INTRAVENOUS at 05:08

## 2019-08-06 RX ADMIN — CLOPIDOGREL BISULFATE 300 MG: 75 TABLET ORAL at 08:08

## 2019-08-06 RX ADMIN — FENTANYL CITRATE 25 MCG: 50 INJECTION, SOLUTION INTRAMUSCULAR; INTRAVENOUS at 06:08

## 2019-08-06 RX ADMIN — SODIUM CHLORIDE 75 ML/HR: 0.9 INJECTION, SOLUTION INTRAVENOUS at 05:08

## 2019-08-06 RX ADMIN — HYDROMORPHONE HYDROCHLORIDE 0.5 MG: 2 INJECTION, SOLUTION INTRAMUSCULAR; INTRAVENOUS; SUBCUTANEOUS at 08:08

## 2019-08-06 RX ADMIN — PROTAMINE SULFATE 20 MG: 10 INJECTION, SOLUTION INTRAVENOUS at 06:08

## 2019-08-06 RX ADMIN — MIDAZOLAM HYDROCHLORIDE 0.5 MG: 1 INJECTION, SOLUTION INTRAMUSCULAR; INTRAVENOUS at 05:08

## 2019-08-06 RX ADMIN — FENTANYL CITRATE 50 MCG: 50 INJECTION, SOLUTION INTRAMUSCULAR; INTRAVENOUS at 05:08

## 2019-08-06 RX ADMIN — GABAPENTIN 300 MG: 300 CAPSULE ORAL at 08:08

## 2019-08-06 RX ADMIN — PANTOPRAZOLE SODIUM 40 MG: 40 TABLET, DELAYED RELEASE ORAL at 09:08

## 2019-08-06 RX ADMIN — AMLODIPINE BESYLATE 10 MG: 5 TABLET ORAL at 09:08

## 2019-08-06 RX ADMIN — SODIUM CHLORIDE: 0.9 INJECTION, SOLUTION INTRAVENOUS at 07:08

## 2019-08-06 RX ADMIN — OXYCODONE HYDROCHLORIDE 5 MG: 5 TABLET ORAL at 03:08

## 2019-08-06 RX ADMIN — PROTAMINE SULFATE 5 MG: 10 INJECTION, SOLUTION INTRAVENOUS at 06:08

## 2019-08-06 RX ADMIN — OXYCODONE HYDROCHLORIDE 5 MG: 5 TABLET ORAL at 07:08

## 2019-08-06 RX ADMIN — SODIUM CHLORIDE: 0.9 INJECTION, SOLUTION INTRAVENOUS at 06:08

## 2019-08-06 NOTE — SUBJECTIVE & OBJECTIVE
Medications:  Continuous Infusions:   sodium chloride 0.9% 125 mL/hr at 08/06/19 0721    heparin (porcine) in D5W Stopped (08/06/19 0400)     Scheduled Meds:   amLODIPine  10 mg Oral Daily    gabapentin  300 mg Oral TID    pantoprazole  40 mg Oral Daily    pravastatin  40 mg Oral QHS    tamsulosin  0.4 mg Oral Daily     PRN Meds:acetaminophen, cloNIDine, dextrose 50%, glucagon (human recombinant), heparin (PORCINE), heparin (PORCINE), HYDROmorphone, insulin aspart U-100, ondansetron, oxyCODONE, sodium chloride 0.9%     Objective:     Vital Signs (Most Recent):  Temp: 98.3 °F (36.8 °C) (08/06/19 1620)  Pulse: 90 (08/06/19 1620)  Resp: 20 (08/06/19 1620)  BP: (!) 146/63 (08/06/19 1620)  SpO2: 96 % (08/06/19 1620) Vital Signs (24h Range):  Temp:  [97.6 °F (36.4 °C)-98.7 °F (37.1 °C)] 98.3 °F (36.8 °C)  Pulse:  [87-95] 90  Resp:  [20] 20  SpO2:  [95 %-100 %] 96 %  BP: (136-152)/(59-68) 146/63     Date 08/06/19 0700 - 08/07/19 0659   Shift 3835-6251 8239-3979 7789-7621 24 Hour Total   INTAKE   P.O. 0   0   Shift Total(mL/kg) 0(0)   0(0)   OUTPUT   Urine(mL/kg/hr) 500(1.3)   500   Shift Total(mL/kg) 500(10.1)   500(10.1)   Weight (kg) 49.4 49.4 49.4 49.4       Physical Exam   Constitutional: He is oriented to person, place, and time. He appears well-developed and well-nourished. No distress.   HENT:   Head: Normocephalic and atraumatic.   Eyes: Conjunctivae are normal.   Neck: Neck supple.   Cardiovascular: Normal rate and regular rhythm.   Pulses:       Radial pulses are 2+ on the right side, and 2+ on the left side.        Femoral pulses are 2+ on the right side, and 1+ on the left side.       Dorsalis pedis pulses are Detected w/ doppler on the right side.        Posterior tibial pulses are Detected w/ doppler on the right side, and Detected w/ doppler on the left side.   L popliteal dopplerable   Pulmonary/Chest: Effort normal. No respiratory distress.   Abdominal: Soft. He exhibits no distension and no mass.  There is no tenderness. There is no rebound and no guarding. No hernia.   Musculoskeletal: Normal range of motion. He exhibits edema and tenderness. He exhibits no deformity.   Feet:   Right Foot:   Skin Integrity: Negative for ulcer.   Left Foot:   Skin Integrity: Negative for ulcer.   Neurological: He is alert and oriented to person, place, and time. No sensory deficit.   Skin: Skin is warm and dry. Capillary refill takes 2 to 3 seconds. No rash noted. He is not diaphoretic. No erythema. No pallor.   Psychiatric: He has a normal mood and affect.   Vitals reviewed.      Significant Labs:  All pertinent labs from the last 24 hours have been reviewed.    Significant Diagnostics:  I have reviewed all pertinent imaging results/findings within the past 24 hours.

## 2019-08-06 NOTE — BRIEF OP NOTE
[facility]  Brief Operative Note    SUMMARY     Surgery Date:  8/6/19    Surgeon: Cj Pyle MD    Assisting Surgeon: None    Pre-op Diagnosis:    1. Left lower extremity atherosclerosis with ischemic rest pain  2. HTN  3. HLD  4. Tobacco abuse    Post-op Diagnosis:    1. same    Procedure:  1. US guided L CFA percutaneous access  2. L femoral angiogram  3. L iliac angiogram  4. L common and external iliac artery stent placement, 7 x 60 mm Life stent  5. L external iliac artery stent placement, 7 x 40 mm Life stent  6. Moderate sedation    Anesthesia: Moderate sedation    Sedation time: 80 minutes  I was present for, and monitored the patients cardio respiratory functions during the moderate sedation. See nurses notes for Intra-service start and end times and for the log of the medicines for the moderate sedation, including their dosage and route.      Description of Procedure: adequate artery for access    Description of the findings of the procedure: improved flow in L iliac artery with patent hypogastric, no evidence of embolization, triphasic L PT at conclusion of case with no groin hematoma    Estimated Blood Loss: <5cc         Specimens:   Specimen (12h ago, onward)    None

## 2019-08-06 NOTE — CONSULTS
[facility]  Vascular Surgery  Consult Note    Inpatient consult to Vascular Surgery  Consult performed by: Cj Pyle MD  Consult ordered by: Adrien Odell MD  Reason for consult: L foot pain        Subjective:     Chief Complaint/Reason for Admission: L foot pain with evidence of critical limb ischemia    History of Present Illness:             Cj Pyle MD VI                       [facility]                         08/05/2019    HPI:  Jerry Galeana is a 59 y.o. male with   Patient Active Problem List   Diagnosis    PVD (peripheral vascular disease) with claudication    Essential hypertension    DM (diabetes mellitus) type II, controlled, with peripheral vascular disorder    BPH (benign prostatic hyperplasia)    Hyperlipidemia    being managed by PCP and specialists who is here today for evaluation of L foot pain.  Patient states location is L foot occurring for 1 mo.  Associated signs and symptoms include edema.  Quality is throbbing and severity is 10/10 at worst.  Symptoms began 1 mo ago, worsening over past week.  Scheduled for L iliac stent 8/7/19 although presented to ER due to worsening pain.  Alleviating factors include pain medication.  Worsening factors include pressure.  Stopped smoking last week after seeing me in clinic.  ER unable to find signals and vascular surgery consulted for further mgmt.    no MI  no Stroke  Tobacco use: quit last week    Past Medical History:   Diagnosis Date    Cigarette smoker     Diabetes mellitus     History of alcohol abuse     Hyperlipidemia     Hypertension     PVD (peripheral vascular disease)     Seizures      Past Surgical History:   Procedure Laterality Date    BRAIN SURGERY      patient states that he had surgery for seizures     History reviewed. No pertinent family history.  Social History     Socioeconomic History    Marital status: Legally      Spouse name: Not on file     Number of children: Not on file    Years of education: Not on file    Highest education level: Not on file   Occupational History    Not on file   Social Needs    Financial resource strain: Not on file    Food insecurity:     Worry: Not on file     Inability: Not on file    Transportation needs:     Medical: Not on file     Non-medical: Not on file   Tobacco Use    Smoking status: Current Every Day Smoker     Packs/day: 1.50    Smokeless tobacco: Never Used   Substance and Sexual Activity    Alcohol use: Not Currently     Comment: History of abuse    Drug use: Not Currently    Sexual activity: Not on file   Lifestyle    Physical activity:     Days per week: Not on file     Minutes per session: Not on file    Stress: Not on file   Relationships    Social connections:     Talks on phone: Not on file     Gets together: Not on file     Attends Yarsani service: Not on file     Active member of club or organization: Not on file     Attends meetings of clubs or organizations: Not on file     Relationship status: Not on file   Other Topics Concern    Not on file   Social History Narrative    Not on file       Current Facility-Administered Medications:     [START ON 8/6/2019] 0.9%  NaCl infusion, , Intravenous, Continuous, Adrien Odell MD    acetaminophen tablet 650 mg, 650 mg, Oral, Q8H PRN, Adrien Odell MD    [START ON 8/6/2019] amLODIPine tablet 10 mg, 10 mg, Oral, Daily, Mile Zavala MD    cloNIDine tablet 0.1 mg, 0.1 mg, Oral, Q4H PRN, Mile Zavala MD    dextrose 50% injection 12.5 g, 12.5 g, Intravenous, PRN, Adrien Odell MD    gabapentin capsule 300 mg, 300 mg, Oral, TID, Mile Zavala MD, 300 mg at 08/05/19 2023    glucagon (human recombinant) injection 1 mg, 1 mg, Intramuscular, PRN, Adrien Odell MD    heparin 25,000 units in dextrose 5% (100 units/ml) IV bolus from bag - ADDITIONAL PRN BOLUS - 30 units/kg, 30 Units/kg,  Intravenous, PRN, Adrien Odell MD    heparin 25,000 units in dextrose 5% (100 units/ml) IV bolus from bag - ADDITIONAL PRN BOLUS - 60 units/kg, 60 Units/kg, Intravenous, PRN, Adrien Odell MD    heparin 25,000 units in dextrose 5% 250 mL (100 units/mL) infusion LOW INTENSITY nomogram - [facility], 12 Units/kg/hr, Intravenous, Continuous, Adrien Odell MD, Last Rate: 6.5 mL/hr at 08/05/19 2049, 12 Units/kg/hr at 08/05/19 2049    hydromorphone (PF) injection 0.5 mg, 0.5 mg, Intravenous, Q4H PRN, Adrien Odell MD    insulin aspart U-100 pen 0-5 Units, 0-5 Units, Subcutaneous, Q6H PRN, Adrien Odell MD    ondansetron disintegrating tablet 8 mg, 8 mg, Oral, Q8H PRN, Adrien Odell MD    oxyCODONE immediate release tablet 5 mg, 5 mg, Oral, Q4H PRN, Adrien Odell MD, 5 mg at 08/05/19 1839    pantoprazole EC tablet 40 mg, 40 mg, Oral, Daily, Adrien Odell MD, 40 mg at 08/05/19 1839    pravastatin tablet 40 mg, 40 mg, Oral, QHS, Adrien Odell MD, 40 mg at 08/05/19 2023    sodium chloride 0.9% flush 10 mL, 10 mL, Intravenous, PRN, Adrien Odell MD    [START ON 8/6/2019] tamsulosin 24 hr capsule 0.4 mg, 0.4 mg, Oral, Daily, Mile Zavala MD      Medications Prior to Admission   Medication Sig Dispense Refill Last Dose    amLODIPine (NORVASC) 5 MG tablet Take 5 mg by mouth once daily.   Taking    dulaglutide (TRULICITY) 0.75 mg/0.5 mL PnIj Inject 0.75 mg into the skin every 7 days.   Taking    ergocalciferol (VITAMIN D2) 50,000 unit Cap Take 50,000 Units by mouth every 7 days.   Taking    gabapentin (NEURONTIN) 300 MG capsule Take 300 mg by mouth.   Taking    latanoprost 0.005 % dpem Apply to eye.   Taking    metFORMIN (GLUCOPHAGE) 500 MG tablet Take 1,000 mg by mouth 2 (two) times daily with meals.    Taking    pravastatin (PRAVACHOL) 40 MG tablet Take 40 mg by mouth once daily.   Taking    tamsulosin (FLOMAX) 0.4 mg Cap Take 0.4 mg by  mouth once daily.   Taking       Review of patient's allergies indicates:  No Known Allergies    Past Medical History:   Diagnosis Date    Cigarette smoker     Diabetes mellitus     History of alcohol abuse     Hyperlipidemia     Hypertension     PVD (peripheral vascular disease)     Seizures      Past Surgical History:   Procedure Laterality Date    BRAIN SURGERY      patient states that he had surgery for seizures     Family History     None        Tobacco Use    Smoking status: Current Every Day Smoker     Packs/day: 1.50    Smokeless tobacco: Never Used   Substance and Sexual Activity    Alcohol use: Not Currently     Comment: History of abuse    Drug use: Not Currently    Sexual activity: Not on file     Review of Systems   Constitutional: Negative for chills and fever.   HENT: Negative for congestion.    Eyes: Negative for visual disturbance.   Respiratory: Negative for shortness of breath.    Cardiovascular: Negative for chest pain.   Gastrointestinal: Negative for abdominal distention.   Endocrine: Negative for cold intolerance.   Genitourinary: Negative for flank pain.   Musculoskeletal: Negative for back pain.   Skin: Negative for color change, pallor, rash and wound.   Allergic/Immunologic: Negative for immunocompromised state.   Neurological: Negative for dizziness, syncope and numbness.   Hematological: Does not bruise/bleed easily.   Psychiatric/Behavioral: Negative for agitation.     Objective:     Vital Signs (Most Recent):  Temp: 98 °F (36.7 °C) (08/05/19 1919)  Pulse: 94 (08/05/19 1919)  Resp: 20 (08/05/19 1919)  BP: 136/63 (08/05/19 1919)  SpO2: 100 % (08/05/19 1919) Vital Signs (24h Range):  Temp:  [98 °F (36.7 °C)-98.7 °F (37.1 °C)] 98 °F (36.7 °C)  Pulse:  [] 94  Resp:  [18-20] 20  SpO2:  [96 %-100 %] 100 %  BP: (134-158)/(58-68) 136/63     Weight: 49.4 kg (108 lb 14.5 oz)  Body mass index is 18.12 kg/m².    Physical Exam   Constitutional: He is oriented to person, place, and  time. He appears well-developed and well-nourished. He appears distressed.   HENT:   Head: Normocephalic and atraumatic.   Eyes: Conjunctivae are normal.   Neck: Neck supple.   Cardiovascular: Normal rate and regular rhythm.   Pulses:       Radial pulses are 2+ on the right side, and 2+ on the left side.        Femoral pulses are 2+ on the right side, and 1+ on the left side.       Dorsalis pedis pulses are Detected w/ doppler on the right side.        Posterior tibial pulses are Detected w/ doppler on the right side, and Detected w/ doppler on the left side.   L popliteal dopplerable   Pulmonary/Chest: Effort normal. No respiratory distress.   Abdominal: Soft. He exhibits no distension and no mass. There is no tenderness. There is no rebound and no guarding. No hernia.   Musculoskeletal: Normal range of motion. He exhibits no deformity.   Feet:   Right Foot:   Skin Integrity: Negative for ulcer.   Left Foot:   Skin Integrity: Negative for ulcer.   Neurological: He is alert and oriented to person, place, and time. No sensory deficit.   Skin: Skin is warm and dry. Capillary refill takes 2 to 3 seconds. No rash noted. He is not diaphoretic.   Psychiatric: He has a normal mood and affect.   Vitals reviewed.      Significant Labs:  All pertinent labs from the last 24 hours have been reviewed.    Significant Diagnostics:  I have reviewed all pertinent imaging results/findings within the past 24 hours.    Assessment/Plan:     * Critical lower limb ischemia  -Recommend admission for heparin drip and pain control with IV pain medication - hold Heparin drip at 0400 on 8/6/19 preoperatively  -NPO at midnight - plan for L iliac stent 8/6/19 for critical limb ischemia; medically urgent    Tobacco abuse  I discussed smoking cessation at length with this patient, including treatment options of nicotine replacement therapy and management of the addiction with [facility].  The patient states  understanding that continued smoking will increase the chances of stenosis progression and other cardiovascular morbidity.      Hyperlipidemia  rec statin    DM (diabetes mellitus) type II, controlled, with peripheral vascular disorder  Glycemic control    Essential hypertension  BP control        Thank you for your consult. I will follow-up with patient. Please contact us if you have any additional questions.    Cj Pyle MD  Vascular Surgery  [facility]

## 2019-08-06 NOTE — HPI
Cj Pyle MD RPVI                       [facility]                         08/05/2019    HPI:  Jerry Galeana is a 59 y.o. male with   Patient Active Problem List   Diagnosis    PVD (peripheral vascular disease) with claudication    Essential hypertension    DM (diabetes mellitus) type II, controlled, with peripheral vascular disorder    BPH (benign prostatic hyperplasia)    Hyperlipidemia    being managed by PCP and specialists who is here today for evaluation of L foot pain.  Patient states location is L foot occurring for 1 mo.  Associated signs and symptoms include edema.  Quality is throbbing and severity is 10/10 at worst.  Symptoms began 1 mo ago, worsening over past week.  Scheduled for L iliac stent 8/7/19 although presented to ER due to worsening pain.  Alleviating factors include pain medication.  Worsening factors include pressure.  Stopped smoking last week after seeing me in clinic.  ER unable to find signals and vascular surgery consulted for further mgmt.    no MI  no Stroke  Tobacco use: quit last week    Past Medical History:   Diagnosis Date    Cigarette smoker     Diabetes mellitus     History of alcohol abuse     Hyperlipidemia     Hypertension     PVD (peripheral vascular disease)     Seizures      Past Surgical History:   Procedure Laterality Date    BRAIN SURGERY      patient states that he had surgery for seizures     History reviewed. No pertinent family history.  Social History     Socioeconomic History    Marital status: Legally      Spouse name: Not on file    Number of children: Not on file    Years of education: Not on file    Highest education level: Not on file   Occupational History    Not on file   Social Needs    Financial resource strain: Not on file    Food insecurity:     Worry: Not on file     Inability: Not on file    Transportation needs:     Medical: Not on file     Non-medical: Not on file   Tobacco Use     Smoking status: Current Every Day Smoker     Packs/day: 1.50    Smokeless tobacco: Never Used   Substance and Sexual Activity    Alcohol use: Not Currently     Comment: History of abuse    Drug use: Not Currently    Sexual activity: Not on file   Lifestyle    Physical activity:     Days per week: Not on file     Minutes per session: Not on file    Stress: Not on file   Relationships    Social connections:     Talks on phone: Not on file     Gets together: Not on file     Attends [employer] service: Not on file     Active member of club or organization: Not on file     Attends meetings of clubs or organizations: Not on file     Relationship status: Not on file   Other Topics Concern    Not on file   Social History Narrative    Not on file       Current Facility-Administered Medications:     [START ON 8/6/2019] 0.9%  NaCl infusion, , Intravenous, Continuous, Adrien Odell MD    acetaminophen tablet 650 mg, 650 mg, Oral, Q8H PRN, Adrien Odell MD    [START ON 8/6/2019] amLODIPine tablet 10 mg, 10 mg, Oral, Daily, Mile Zavala MD    cloNIDine tablet 0.1 mg, 0.1 mg, Oral, Q4H PRN, Mile Zavala MD    dextrose 50% injection 12.5 g, 12.5 g, Intravenous, PRN, Adrien Odell MD    gabapentin capsule 300 mg, 300 mg, Oral, TID, Mile Zavala MD, 300 mg at 08/05/19 2023    glucagon (human recombinant) injection 1 mg, 1 mg, Intramuscular, PRN, Adrien Odell MD    heparin 25,000 units in dextrose 5% (100 units/ml) IV bolus from bag - ADDITIONAL PRN BOLUS - 30 units/kg, 30 Units/kg, Intravenous, PRN, Adrien Odell MD    heparin 25,000 units in dextrose 5% (100 units/ml) IV bolus from bag - ADDITIONAL PRN BOLUS - 60 units/kg, 60 Units/kg, Intravenous, PRN, Adrien Odell MD    heparin 25,000 units in dextrose 5% 250 mL (100 units/mL) infusion LOW INTENSITY nomogram - [facility], 12 Units/kg/hr, Intravenous, Continuous, Adrien Odell MD, Last Rate:  6.5 mL/hr at 08/05/19 2049, 12 Units/kg/hr at 08/05/19 2049    hydromorphone (PF) injection 0.5 mg, 0.5 mg, Intravenous, Q4H PRN, Adrien Odell MD    insulin aspart U-100 pen 0-5 Units, 0-5 Units, Subcutaneous, Q6H PRN, Adrien Odell MD    ondansetron disintegrating tablet 8 mg, 8 mg, Oral, Q8H PRN, Adrien Odell MD    oxyCODONE immediate release tablet 5 mg, 5 mg, Oral, Q4H PRN, Adrien Odell MD, 5 mg at 08/05/19 1839    pantoprazole EC tablet 40 mg, 40 mg, Oral, Daily, Adrien Odell MD, 40 mg at 08/05/19 1839    pravastatin tablet 40 mg, 40 mg, Oral, QHS, Adrien Odell MD, 40 mg at 08/05/19 2023    sodium chloride 0.9% flush 10 mL, 10 mL, Intravenous, PRN, Adrien Odell MD    [START ON 8/6/2019] tamsulosin 24 hr capsule 0.4 mg, 0.4 mg, Oral, Daily, Mile Zavala MD

## 2019-08-06 NOTE — SUBJECTIVE & OBJECTIVE
Medications Prior to Admission   Medication Sig Dispense Refill Last Dose    amLODIPine (NORVASC) 5 MG tablet Take 5 mg by mouth once daily.   Taking    dulaglutide (TRULICITY) 0.75 mg/0.5 mL PnIj Inject 0.75 mg into the skin every 7 days.   Taking    ergocalciferol (VITAMIN D2) 50,000 unit Cap Take 50,000 Units by mouth every 7 days.   Taking    gabapentin (NEURONTIN) 300 MG capsule Take 300 mg by mouth.   Taking    latanoprost 0.005 % dpem Apply to eye.   Taking    metFORMIN (GLUCOPHAGE) 500 MG tablet Take 1,000 mg by mouth 2 (two) times daily with meals.    Taking    pravastatin (PRAVACHOL) 40 MG tablet Take 40 mg by mouth once daily.   Taking    tamsulosin (FLOMAX) 0.4 mg Cap Take 0.4 mg by mouth once daily.   Taking       Review of patient's allergies indicates:  No Known Allergies    Past Medical History:   Diagnosis Date    Cigarette smoker     Diabetes mellitus     History of alcohol abuse     Hyperlipidemia     Hypertension     PVD (peripheral vascular disease)     Seizures      Past Surgical History:   Procedure Laterality Date    BRAIN SURGERY      patient states that he had surgery for seizures     Family History     None        Tobacco Use    Smoking status: Current Every Day Smoker     Packs/day: 1.50    Smokeless tobacco: Never Used   Substance and Sexual Activity    Alcohol use: Not Currently     Comment: History of abuse    Drug use: Not Currently    Sexual activity: Not on file     Review of Systems   Constitutional: Negative for chills and fever.   HENT: Negative for congestion.    Eyes: Negative for visual disturbance.   Respiratory: Negative for shortness of breath.    Cardiovascular: Negative for chest pain.   Gastrointestinal: Negative for abdominal distention.   Endocrine: Negative for cold intolerance.   Genitourinary: Negative for flank pain.   Musculoskeletal: Negative for back pain.   Skin: Negative for color change, pallor, rash and wound.   Allergic/Immunologic:  Negative for immunocompromised state.   Neurological: Negative for dizziness, syncope and numbness.   Hematological: Does not bruise/bleed easily.   Psychiatric/Behavioral: Negative for agitation.     Objective:     Vital Signs (Most Recent):  Temp: 98 °F (36.7 °C) (08/05/19 1919)  Pulse: 94 (08/05/19 1919)  Resp: 20 (08/05/19 1919)  BP: 136/63 (08/05/19 1919)  SpO2: 100 % (08/05/19 1919) Vital Signs (24h Range):  Temp:  [98 °F (36.7 °C)-98.7 °F (37.1 °C)] 98 °F (36.7 °C)  Pulse:  [] 94  Resp:  [18-20] 20  SpO2:  [96 %-100 %] 100 %  BP: (134-158)/(58-68) 136/63     Weight: 49.4 kg (108 lb 14.5 oz)  Body mass index is 18.12 kg/m².    Physical Exam   Constitutional: He is oriented to person, place, and time. He appears well-developed and well-nourished. He appears distressed.   HENT:   Head: Normocephalic and atraumatic.   Eyes: Conjunctivae are normal.   Neck: Neck supple.   Cardiovascular: Normal rate and regular rhythm.   Pulses:       Radial pulses are 2+ on the right side, and 2+ on the left side.        Femoral pulses are 2+ on the right side, and 1+ on the left side.       Dorsalis pedis pulses are Detected w/ doppler on the right side.        Posterior tibial pulses are Detected w/ doppler on the right side, and Detected w/ doppler on the left side.   L popliteal dopplerable   Pulmonary/Chest: Effort normal. No respiratory distress.   Abdominal: Soft. He exhibits no distension and no mass. There is no tenderness. There is no rebound and no guarding. No hernia.   Musculoskeletal: Normal range of motion. He exhibits no deformity.   Feet:   Right Foot:   Skin Integrity: Negative for ulcer.   Left Foot:   Skin Integrity: Negative for ulcer.   Neurological: He is alert and oriented to person, place, and time. No sensory deficit.   Skin: Skin is warm and dry. Capillary refill takes 2 to 3 seconds. No rash noted. He is not diaphoretic.   Psychiatric: He has a normal mood and affect.   Vitals  reviewed.      Significant Labs:  All pertinent labs from the last 24 hours have been reviewed.    Significant Diagnostics:  I have reviewed all pertinent imaging results/findings within the past 24 hours.

## 2019-08-06 NOTE — ASSESSMENT & PLAN NOTE
-Recommend admission for heparin drip and pain control with IV pain medication - hold Heparin drip at 0400 on 8/6/19 preoperatively  -NPO at midnight - plan for L iliac stent 8/6/19 for critical limb ischemia; medically urgent

## 2019-08-06 NOTE — PROGRESS NOTES
[facility]  Vascular Surgery  Progress Note    Patient Name: Jerry Galeana  MRN: [id number]  Admission Date: 8/5/2019  Primary Care Provider: Babak Bryan MD    Subjective:     Interval History: States L foot pain improved with Heparin.    Post-Op Info:  * No surgery found *           Medications:  Continuous Infusions:   sodium chloride 0.9% 125 mL/hr at 08/06/19 0721    heparin (porcine) in D5W Stopped (08/06/19 0400)     Scheduled Meds:   amLODIPine  10 mg Oral Daily    gabapentin  300 mg Oral TID    pantoprazole  40 mg Oral Daily    pravastatin  40 mg Oral QHS    tamsulosin  0.4 mg Oral Daily     PRN Meds:acetaminophen, cloNIDine, dextrose 50%, glucagon (human recombinant), heparin (PORCINE), heparin (PORCINE), HYDROmorphone, insulin aspart U-100, ondansetron, oxyCODONE, sodium chloride 0.9%     Objective:     Vital Signs (Most Recent):  Temp: 98.3 °F (36.8 °C) (08/06/19 1620)  Pulse: 90 (08/06/19 1620)  Resp: 20 (08/06/19 1620)  BP: (!) 146/63 (08/06/19 1620)  SpO2: 96 % (08/06/19 1620) Vital Signs (24h Range):  Temp:  [97.6 °F (36.4 °C)-98.7 °F (37.1 °C)] 98.3 °F (36.8 °C)  Pulse:  [87-95] 90  Resp:  [20] 20  SpO2:  [95 %-100 %] 96 %  BP: (136-152)/(59-68) 146/63     Date 08/06/19 0700 - 08/07/19 0659   Shift 6755-2935 0349-4204 7936-4087 24 Hour Total   INTAKE   P.O. 0   0   Shift Total(mL/kg) 0(0)   0(0)   OUTPUT   Urine(mL/kg/hr) 500(1.3)   500   Shift Total(mL/kg) 500(10.1)   500(10.1)   Weight (kg) 49.4 49.4 49.4 49.4       Physical Exam   Constitutional: He is oriented to person, place, and time. He appears well-developed and well-nourished. No distress.   HENT:   Head: Normocephalic and atraumatic.   Eyes: Conjunctivae are normal.   Neck: Neck supple.   Cardiovascular: Normal rate and regular rhythm.   Pulses:       Radial pulses are 2+ on the right side, and 2+ on the left side.        Femoral pulses are 2+ on the right side, and 1+ on the left side.       Dorsalis pedis pulses  are Detected w/ doppler on the right side.        Posterior tibial pulses are Detected w/ doppler on the right side, and Detected w/ doppler on the left side.   L popliteal dopplerable   Pulmonary/Chest: Effort normal. No respiratory distress.   Abdominal: Soft. He exhibits no distension and no mass. There is no tenderness. There is no rebound and no guarding. No hernia.   Musculoskeletal: Normal range of motion. He exhibits edema and tenderness. He exhibits no deformity.   Feet:   Right Foot:   Skin Integrity: Negative for ulcer.   Left Foot:   Skin Integrity: Negative for ulcer.   Neurological: He is alert and oriented to person, place, and time. No sensory deficit.   Skin: Skin is warm and dry. Capillary refill takes 2 to 3 seconds. No rash noted. He is not diaphoretic. No erythema. No pallor.   Psychiatric: He has a normal mood and affect.   Vitals reviewed.      Significant Labs:  All pertinent labs from the last 24 hours have been reviewed.    Significant Diagnostics:  I have reviewed all pertinent imaging results/findings within the past 24 hours.    Assessment/Plan:     * Critical lower limb ischemia  -plan for L iliac stent 8/6/19 for critical limb ischemia; medically urgent  -NPO  -Hold Heparin gtt    Tobacco abuse  I discussed smoking cessation at length with this patient, including treatment options of nicotine replacement therapy and management of the addiction with [facility] by [facility].  The patient states understanding that continued smoking will increase the chances of stenosis progression and other cardiovascular morbidity.      Hyperlipidemia  rec statin    DM (diabetes mellitus) type II, controlled, with peripheral vascular disorder  Glycemic control    Essential hypertension  BP control        Cj Pyle MD  Vascular Surgery  [facility]

## 2019-08-06 NOTE — SEDATION DOCUMENTATION
LifeStent #1 inserted to LLE  5French stent Ref#[id number]; Lot# [id number]    LifeStent #2 inserted to LLE 5french stent Ref#[id number] ; Lot# [id number]

## 2019-08-06 NOTE — ASSESSMENT & PLAN NOTE
-plan for L iliac stent 8/6/19 for critical limb ischemia; medically urgent  -NPO  -Hold Heparin gtt

## 2019-08-06 NOTE — PLAN OF CARE
Problem: Fall Injury Risk  Goal: Absence of Fall and Fall-Related Injury  Outcome: Ongoing (interventions implemented as appropriate)  Intervention: Identify and Manage Contributors to Fall Injury Risk     08/06/19 1621   Manage Acute Allergic Reaction   Medication Review/Management medications reviewed;high risk medications identified   Identify and Manage Contributors to Fall Injury Risk   Self-Care Promotion BADL personal objects within reach     Pt free from falls/injury throughout shift. Will continue to monitor.

## 2019-08-07 LAB
ANION GAP SERPL CALC-SCNC: 8 MMOL/L (ref 8–16)
BASOPHILS # BLD AUTO: 0.02 K/UL (ref 0–0.2)
BASOPHILS NFR BLD: 0.2 % (ref 0–1.9)
BUN SERPL-MCNC: 8 MG/DL (ref 8–23)
CALCIUM SERPL-MCNC: 8.7 MG/DL (ref 8.7–10.5)
CHLORIDE SERPL-SCNC: 107 MMOL/L (ref 95–110)
CO2 SERPL-SCNC: 26 MMOL/L (ref 23–29)
CREAT SERPL-MCNC: 0.7 MG/DL (ref 0.5–1.4)
DIFFERENTIAL METHOD: ABNORMAL
EOSINOPHIL # BLD AUTO: 0.1 K/UL (ref 0–0.5)
EOSINOPHIL NFR BLD: 1.3 % (ref 0–8)
ERYTHROCYTE [DISTWIDTH] IN BLOOD BY AUTOMATED COUNT: 14.2 % (ref 11.5–14.5)
EST. GFR  (AFRICAN AMERICAN): >60 ML/MIN/1.73 M^2
EST. GFR  (NON AFRICAN AMERICAN): >60 ML/MIN/1.73 M^2
GLUCOSE SERPL-MCNC: 173 MG/DL (ref 70–110)
HCT VFR BLD AUTO: 39.6 % (ref 40–54)
HGB BLD-MCNC: 12.6 G/DL (ref 14–18)
LYMPHOCYTES # BLD AUTO: 2.8 K/UL (ref 1–4.8)
LYMPHOCYTES NFR BLD: 25.2 % (ref 18–48)
MCH RBC QN AUTO: 29.4 PG (ref 27–31)
MCHC RBC AUTO-ENTMCNC: 31.8 G/DL (ref 32–36)
MCV RBC AUTO: 93 FL (ref 82–98)
MONOCYTES # BLD AUTO: 1.4 K/UL (ref 0.3–1)
MONOCYTES NFR BLD: 12.4 % (ref 4–15)
NEUTROPHILS # BLD AUTO: 6.7 K/UL (ref 1.8–7.7)
NEUTROPHILS NFR BLD: 60.9 % (ref 38–73)
PLATELET # BLD AUTO: 279 K/UL (ref 150–350)
PMV BLD AUTO: 11.5 FL (ref 9.2–12.9)
POCT GLUCOSE: 117 MG/DL (ref 70–110)
POCT GLUCOSE: 165 MG/DL (ref 70–110)
POCT GLUCOSE: 167 MG/DL (ref 70–110)
POCT GLUCOSE: 234 MG/DL (ref 70–110)
POTASSIUM SERPL-SCNC: 3.8 MMOL/L (ref 3.5–5.1)
RBC # BLD AUTO: 4.28 M/UL (ref 4.6–6.2)
SODIUM SERPL-SCNC: 141 MMOL/L (ref 136–145)
WBC # BLD AUTO: 10.96 K/UL (ref 3.9–12.7)

## 2019-08-07 PROCEDURE — 36415 COLL VENOUS BLD VENIPUNCTURE: CPT

## 2019-08-07 PROCEDURE — 25000003 PHARM REV CODE 250: Performed by: EMERGENCY MEDICINE

## 2019-08-07 PROCEDURE — 97165 OT EVAL LOW COMPLEX 30 MIN: CPT

## 2019-08-07 PROCEDURE — 25000003 PHARM REV CODE 250: Performed by: SURGERY

## 2019-08-07 PROCEDURE — 63600175 PHARM REV CODE 636 W HCPCS: Performed by: EMERGENCY MEDICINE

## 2019-08-07 PROCEDURE — 85025 COMPLETE CBC W/AUTO DIFF WBC: CPT

## 2019-08-07 PROCEDURE — 25000003 PHARM REV CODE 250: Performed by: HOSPITALIST

## 2019-08-07 PROCEDURE — 99024 POSTOP FOLLOW-UP VISIT: CPT | Mod: ,,, | Performed by: SURGERY

## 2019-08-07 PROCEDURE — 80048 BASIC METABOLIC PNL TOTAL CA: CPT

## 2019-08-07 PROCEDURE — 11000001 HC ACUTE MED/SURG PRIVATE ROOM

## 2019-08-07 PROCEDURE — 99024 PR POST-OP FOLLOW-UP VISIT: ICD-10-PCS | Mod: ,,, | Performed by: SURGERY

## 2019-08-07 PROCEDURE — 63600175 PHARM REV CODE 636 W HCPCS: Performed by: HOSPITALIST

## 2019-08-07 PROCEDURE — 97161 PT EVAL LOW COMPLEX 20 MIN: CPT

## 2019-08-07 RX ORDER — OXYCODONE HYDROCHLORIDE 5 MG/1
10 TABLET ORAL EVERY 4 HOURS PRN
Status: DISCONTINUED | OUTPATIENT
Start: 2019-08-07 | End: 2019-08-14 | Stop reason: HOSPADM

## 2019-08-07 RX ORDER — INSULIN ASPART 100 [IU]/ML
0-5 INJECTION, SOLUTION INTRAVENOUS; SUBCUTANEOUS
Status: DISCONTINUED | OUTPATIENT
Start: 2019-08-07 | End: 2019-08-14 | Stop reason: HOSPADM

## 2019-08-07 RX ORDER — HYDROMORPHONE HYDROCHLORIDE 2 MG/ML
1 INJECTION, SOLUTION INTRAMUSCULAR; INTRAVENOUS; SUBCUTANEOUS EVERY 4 HOURS PRN
Status: DISCONTINUED | OUTPATIENT
Start: 2019-08-07 | End: 2019-08-14 | Stop reason: HOSPADM

## 2019-08-07 RX ADMIN — OXYCODONE HYDROCHLORIDE 10 MG: 5 TABLET ORAL at 08:08

## 2019-08-07 RX ADMIN — HYDROMORPHONE HYDROCHLORIDE 1 MG: 2 INJECTION, SOLUTION INTRAMUSCULAR; INTRAVENOUS; SUBCUTANEOUS at 02:08

## 2019-08-07 RX ADMIN — GABAPENTIN 300 MG: 300 CAPSULE ORAL at 08:08

## 2019-08-07 RX ADMIN — AMLODIPINE BESYLATE 10 MG: 5 TABLET ORAL at 08:08

## 2019-08-07 RX ADMIN — GABAPENTIN 300 MG: 300 CAPSULE ORAL at 03:08

## 2019-08-07 RX ADMIN — SODIUM CHLORIDE: 0.9 INJECTION, SOLUTION INTRAVENOUS at 12:08

## 2019-08-07 RX ADMIN — INSULIN ASPART 2 UNITS: 100 INJECTION, SOLUTION INTRAVENOUS; SUBCUTANEOUS at 05:08

## 2019-08-07 RX ADMIN — PANTOPRAZOLE SODIUM 40 MG: 40 TABLET, DELAYED RELEASE ORAL at 08:08

## 2019-08-07 RX ADMIN — TAMSULOSIN HYDROCHLORIDE 0.4 MG: 0.4 CAPSULE ORAL at 08:08

## 2019-08-07 RX ADMIN — HYDROMORPHONE HYDROCHLORIDE 0.5 MG: 2 INJECTION, SOLUTION INTRAMUSCULAR; INTRAVENOUS; SUBCUTANEOUS at 01:08

## 2019-08-07 RX ADMIN — HYDROMORPHONE HYDROCHLORIDE 0.5 MG: 2 INJECTION, SOLUTION INTRAMUSCULAR; INTRAVENOUS; SUBCUTANEOUS at 08:08

## 2019-08-07 RX ADMIN — CLOPIDOGREL BISULFATE 75 MG: 75 TABLET ORAL at 08:08

## 2019-08-07 RX ADMIN — ASPIRIN 81 MG 81 MG: 81 TABLET ORAL at 08:08

## 2019-08-07 RX ADMIN — SODIUM CHLORIDE: 0.9 INJECTION, SOLUTION INTRAVENOUS at 04:08

## 2019-08-07 RX ADMIN — OXYCODONE HYDROCHLORIDE 5 MG: 5 TABLET ORAL at 12:08

## 2019-08-07 RX ADMIN — PRAVASTATIN SODIUM 40 MG: 40 TABLET ORAL at 08:08

## 2019-08-07 NOTE — PT/OT/SLP EVAL
Occupational Therapy   Evaluation    Name: Jerry Galeana  MRN: [id number]  Admitting Diagnosis:  Critical lower limb ischemia      Recommendations:     Discharge Recommendations: rehabilitation facility  Discharge Equipment Recommendations:  (TBD)  Barriers to discharge:  Decreased caregiver support    Assessment:     Jerry Galeana is a 59 y.o. male with a medical diagnosis of Critical lower limb ischemia.  He presents with increased pain at rest and any movement with LLE, with jerky reaction to no or any touch to foot. Pt was unable to put a sock on or WB at all on LLE. Pt only tolerated hopping sidesteps at EOB with minimal unsteadiness using RW. Despite the increased pain, pt was motivated to participate in therapy session. Performance deficits affecting function: weakness, impaired endurance, impaired self care skills, impaired balance, decreased safety awareness, impaired skin, impaired functional mobilty, pain, gait instability, impaired sensation, decreased lower extremity function. Pt was independent PTA and now is a high fall risk with decreased independence with ADLs, all aspects of functional mobility, and decreased functional activity tolerance; therefore, pt will benefit from inpatient rehab at d/c.     Rehab Prognosis: Good; patient would benefit from acute skilled OT services to address these deficits and reach maximum level of function.       Plan:     Patient to be seen 5 x/week to address the above listed problems via self-care/home management, therapeutic activities, therapeutic exercises  · Plan of Care Expires: 08/21/19  · Plan of Care Reviewed with: patient    Subjective     Chief Complaint: increased pain to LLE  Patient/Family Comments/goals: reduce pain     Occupational Profile:  Living Environment: Pt lives alone in a SS house with 0 steps at entry. Bathroom set-up: walk-in shower.   Previous level of function: Pt was independent with ADLs/iADLs PTA and just recently started ambulating with str c  d/t pain (which has not been helping to reduce pain). Pt does not drive, but walks to the store/needed areas. Pt uses transportation services for MD appts.   Equipment Used at Home:  cane, straight  Assistance upon Discharge: none     Pain/Comfort:  Pain Rating 1: 10/10  Location - Side 1: Left  Location - Orientation 1: distal  Location 1: foot  Pain Addressed 1: Pre-medicate for activity, Cessation of Activity, Reposition, Nurse notified, Distraction  Pain Rating Post-Intervention 1: 10/10    Patients cultural, spiritual, Jainism conflicts given the current situation: no    Objective:     Communicated with: nurseValentina, prior to session.  Patient found HOB elevated with peripheral IV upon OT entry to room.    General Precautions: Standard, fall   Orthopedic Precautions:N/A   Braces: N/A     Occupational Performance:    Bed Mobility:    · Patient completed Scooting/Bridging with stand by assistance  · Patient completed Supine to Sit with stand by assistance and HOB elevated  · Patient completed Sit to Supine with stand by assistance    Functional Mobility/Transfers:  · Patient completed Sit <> Stand Transfer with minimum assistance  with  rolling walker   · Functional Mobility: Pt was only able to tolerate ~3-4 sidesteps at EOB with RW, requiring MIN A. Pt ambulated with NWB to LLE d/t pain.     Activities of Daily Living:  · Upper Body Dressing: MIN A to steady pt while donning hospital gown; pt was unsteady with posterior lean with increased pain     · Lower Body Dressing: contact guard assistance with R sock     Cognitive/Visual Perceptual:  Cognitive/Psychosocial Skills:     -       Oriented to: Person, Place, Time and Situation   -       Follows Commands/attention:Follows multistep  commands  -       Communication: clear/fluent  -       Memory: No Deficits noted  -       Safety awareness/insight to disability: impaired   -       Mood/Affect/Coping skills/emotional control: Appropriate to situation and  Pleasant  Visual/Perceptual:      -Intact  acuity      Physical Exam:  Balance:    -       sitting: SBA (MIN A at times d/t increased pain: demo'd increased posterior lean); standing: MIN A with RW.   Postural examination/scapula alignment:    -       Rounded shoulders  -       Forward head  Skin integrity: Visible skin intact  Edema:  no BUE edema noted  Sensation:    -       Intact  light/touch BUE; pt reports increased pain and high sensitivity to LLE   Dominant hand:    -       Right  Upper Extremity Range of Motion:     -       Right Upper Extremity: WFL  -       Left Upper Extremity: WFL  Upper Extremity Strength:    -       Right Upper Extremity: WFL  -       Left Upper Extremity: WFL  Fine Motor Coordination:    -       Intact  Left hand, manipulation of objects and Right hand, manipulation of objects  Gross motor coordination:   impaired 2* pain     AMPAC 6 Click ADL:  AMPAC Total Score: 21    Treatment & Education:  Pt educated on OT role/POC.   Importance of OOB activity with staff assistance.  Safety during functional t/f and mobility   White board updated   Multiple self-care tasks/functional mobility completed- assistance level noted above   All questions/concerns answered within OT scope of practice     Education:    Patient left HOB elevated with all lines intact, call button in reach, bed alarm on and nurseValentina, and PCTNneka, notified    GOALS:   Multidisciplinary Problems     Occupational Therapy Goals        Problem: Occupational Therapy Goal    Goal Priority Disciplines Outcome Interventions   Occupational Therapy Goal     OT, PT/OT Ongoing (interventions implemented as appropriate)    Description:  Goals to be met by: 08/21/19     Patient will increase functional independence with ADLs by performing:    UE Dressing with Modified [place].  LE Dressing with Supervision.  Grooming while seated with Set-up Assistance.  Toileting from bedside commode with Stand-by Assistance for hygiene  and clothing management.   Sitting at edge of bed x10 minutes with Supervision.  Step transfer with Stand-by Assistance  Toilet transfer to bedside commode with Stand-by Assistance.  Upper extremity exercise program x15 reps per handout, with independence.                      History:     Past Medical History:   Diagnosis Date    Cigarette smoker     Diabetes mellitus     History of alcohol abuse     Hyperlipidemia     Hypertension     PVD (peripheral vascular disease)     Seizures          Past Surgical History:   Procedure Laterality Date    BRAIN SURGERY      patient states that he had surgery for seizures       Time Tracking:     OT Date of Treatment: 08/07/19  OT Start Time: 1425  OT Stop Time: 1445  OT Total Time (min): 20 min    Billable Minutes:Evaluation 20 min     Avelina Meza OT  8/7/2019

## 2019-08-07 NOTE — SUBJECTIVE & OBJECTIVE
Interval History: Pt report pain to left leg    Review of Systems   Constitutional: Negative for chills and fever.   Respiratory: Negative for shortness of breath.    Cardiovascular: Negative for chest pain.     Objective:     Vital Signs (Most Recent):  Temp: 98.3 °F (36.8 °C) (08/06/19 2007)  Pulse: 87 (08/06/19 2007)  Resp: 15 (08/06/19 2007)  BP: (!) 158/69 (08/06/19 2007)  SpO2: 98 % (08/06/19 2200) Vital Signs (24h Range):  Temp:  [98.2 °F (36.8 °C)-98.6 °F (37 °C)] 98.3 °F (36.8 °C)  Pulse:  [73-98] 87  Resp:  [12-20] 15  SpO2:  [95 %-100 %] 98 %  BP: (123-158)/(59-78) 158/69     Weight: 49.4 kg (108 lb 14.5 oz)  Body mass index is 18.12 kg/m².    Intake/Output Summary (Last 24 hours) at 8/7/2019 0008  Last data filed at 8/6/2019 1600  Gross per 24 hour   Intake 406.69 ml   Output 1325 ml   Net -918.31 ml      Physical Exam   Constitutional: He is oriented to person, place, and time. No distress.   HENT:   Head: Atraumatic.   Eyes: Pupils are equal, round, and reactive to light. EOM are normal.   Neck: Normal range of motion. Neck supple.   Cardiovascular: Normal rate and regular rhythm.   Pulmonary/Chest: Effort normal and breath sounds normal.   Abdominal: Soft.   Musculoskeletal: Normal range of motion. He exhibits tenderness. He exhibits no deformity.   Left leg cool to touch compared with right, no palpable pulse.   Neurological: He is oriented to person, place, and time. No cranial nerve deficit. Coordination normal.   Skin: Skin is warm. There is pallor.   Psychiatric: He has a normal mood and affect. His behavior is normal.       Significant Labs: All pertinent labs within the past 24 hours have been reviewed.    Significant Imaging: I have reviewed and interpreted all pertinent imaging results/findings within the past 24 hours.

## 2019-08-07 NOTE — PLAN OF CARE
Problem: Adult Inpatient Plan of Care  Goal: Plan of Care Review  Outcome: Ongoing (interventions implemented as appropriate)     08/07/19 9597   Plan of Care Review   Plan of Care Reviewed With patient   Patient remains free from fall or injury. Pain managed with PRN pain medication. Patient lying flat. Site to left groin c/d/i. Patient voiding via urinal. IVF infusing as ordered. Scheduled meds administered as ordered and tolerated well. Safety measures in place. Call light in reach. Will continue to monitor.

## 2019-08-07 NOTE — PLAN OF CARE
Problem: Physical Therapy Goal  Goal: Physical Therapy Goal  Goals to be met by: 2019     Patient will increase functional independence with mobility by performin. Supine to sit with Modified [place]  2. Sit to stand transfer with Modified [place]  3. Bed to chair transfer with Modified [place]   4. Gait  x 25-50' feet with Modified [place] using Rolling Walker.   5. Wheelchair propulsion x150 feet with Modified [place] using bilateral uppper extremities  6. Lower extremity exercise program x10 reps per handout, with independence    Outcome: Ongoing (interventions implemented as appropriate)  Initial PT evaluation performed.  Pt could benefit from skilled PT services 6x/wk in order to maximize function prior to D/C.  IPR recommended as patient was living alone independently PTA.  Pt unsafe to return home by himself at present time.  Ok for OOB to chair and BSC with nursing staff

## 2019-08-07 NOTE — PROGRESS NOTES
[facility] Hospital Medicine  Progress Note    Patient Name: Jerry Galeana  MRN: [id number]  Patient Class: IP- Inpatient   Admission Date: 8/5/2019  Length of Stay: 2 days  Attending Physician: Chata Champagne MD  Primary Care Provider: Babak Bryan MD        Subjective:     Principal Problem:Critical lower limb ischemia      HPI:  This is a 59 y.o male patient, with a PMHx of diabetes mellitus, hypertension, and hyperlipidemia, presenting to the ED with a complaint of 10/10, sharp, chronic, left ankle and foot pain, numbness, and swelling, that began a few months ago, and worsened x4 days ago. He reports the pain at rest began yesterday. Patient states the pain is worse with movement. Patient reports he's having a surgery in x2 days on his left leg due to his atherosclerosis by , . Patient denies any fever, chills, shortness of breath, chest pain, neck pain, back pain, abdominal pain, rash, headaches, congestion, rhinorrhea, cough, sore throat, ear pain, eye pain, blurred vision, nausea, vomiting, diarrhea, dysuria, or any other associated symptoms. No prior Tx.. He reports he stopped smoking cigarettes x1 week ago. evaluated patient in ER recommending heparin drip,US arterial and X ray and plan for revascularization AM.    Overview/Hospital Course:  No notes on file    Interval History: Pt report pain to left leg    Review of Systems   Constitutional: Negative for chills and fever.   Respiratory: Negative for shortness of breath.    Cardiovascular: Negative for chest pain.     Objective:     Vital Signs (Most Recent):  Temp: 98.3 °F (36.8 °C) (08/06/19 2007)  Pulse: 87 (08/06/19 2007)  Resp: 15 (08/06/19 2007)  BP: (!) 158/69 (08/06/19 2007)  SpO2: 98 % (08/06/19 2200) Vital Signs (24h Range):  Temp:  [98.2 °F (36.8 °C)-98.6 °F (37 °C)] 98.3 °F (36.8 °C)  Pulse:  [73-98] 87  Resp:  [12-20] 15  SpO2:  [95 %-100 %] 98 %  BP: (123-158)/(59-78) 158/69     Weight: 49.4 kg (108 lb  14.5 oz)  Body mass index is 18.12 kg/m².    Intake/Output Summary (Last 24 hours) at 8/7/2019 0008  Last data filed at 8/6/2019 1600  Gross per 24 hour   Intake 406.69 ml   Output 1325 ml   Net -918.31 ml      Physical Exam   Constitutional: He is oriented to person, place, and time. No distress.   HENT:   Head: Atraumatic.   Eyes: Pupils are equal, round, and reactive to light. EOM are normal.   Neck: Normal range of motion. Neck supple.   Cardiovascular: Normal rate and regular rhythm.   Pulmonary/Chest: Effort normal and breath sounds normal.   Abdominal: Soft.   Musculoskeletal: Normal range of motion. He exhibits tenderness. He exhibits no deformity.   Left leg cool to touch compared with right, no palpable pulse.   Neurological: He is oriented to person, place, and time. No cranial nerve deficit. Coordination normal.   Skin: Skin is warm. There is pallor.   Psychiatric: He has a normal mood and affect. His behavior is normal.       Significant Labs: All pertinent labs within the past 24 hours have been reviewed.    Significant Imaging: I have reviewed and interpreted all pertinent imaging results/findings within the past 24 hours.      Assessment/Plan:      * Critical lower limb ischemia  Patient with severe PVD that is symtomatic  Treated with heparin gtt  Plan for stenting today by Vascular    Tobacco abuse  Smoking cessation counseling done for > 3 minutes    Hyperlipidemia  Continue statin.    BPH (benign prostatic hyperplasia)  Resume Flomax    DM (diabetes mellitus) type II, controlled, with peripheral vascular disorder  Continue on SSI.    Essential hypertension  Resume home medication Norvasc and added prn clonidine.        VTE Risk Mitigation (From admission, onward)        Ordered     IP VTE HIGH RISK PATIENT  Once      08/05/19 1825                Chata Champagne MD  Department of Hospital Medicine   [facility]

## 2019-08-07 NOTE — PROGRESS NOTES
[facility]  Vascular Surgery  Progress Note    Patient Name: Jerry Galeana  MRN: [id number]  Admission Date: 8/5/2019  Primary Care Provider: Babak Bryan MD    Subjective:     Interval History: Improvement in symptoms s/p L iliac stent 8/6/19.    Post-Op Info:  * No surgery found *           Medications:  Continuous Infusions:   sodium chloride 0.9% 125 mL/hr at 08/07/19 0443     Scheduled Meds:   amLODIPine  10 mg Oral Daily    aspirin  81 mg Oral Daily    clopidogrel  75 mg Oral Daily    gabapentin  300 mg Oral TID    pantoprazole  40 mg Oral Daily    pravastatin  40 mg Oral QHS    tamsulosin  0.4 mg Oral Daily     PRN Meds:acetaminophen, cloNIDine, dextrose 50%, glucagon (human recombinant), HYDROmorphone, insulin aspart U-100, ondansetron, oxyCODONE, sodium chloride 0.9%     Objective:     Vital Signs (Most Recent):  Temp: 97.6 °F (36.4 °C) (08/07/19 0444)  Pulse: 99 (08/07/19 0444)  Resp: 16 (08/07/19 0444)  BP: (!) 157/70 (08/07/19 0444)  SpO2: 99 % (08/07/19 0444) Vital Signs (24h Range):  Temp:  [97.6 °F (36.4 °C)-98.6 °F (37 °C)] 97.6 °F (36.4 °C)  Pulse:  [73-99] 99  Resp:  [12-20] 16  SpO2:  [95 %-100 %] 99 %  BP: (123-158)/(60-78) 157/70     Date 08/07/19 0700 - 08/08/19 0659   Shift 1163-5649 4277-0431 5351-2637 24 Hour Total   INTAKE   Shift Total(mL/kg)       OUTPUT   Urine(mL/kg/hr) 350   350   Shift Total(mL/kg) 350(7.1)   350(7.1)   Weight (kg) 49.4 49.4 49.4 49.4       Physical Exam   Constitutional: He is oriented to person, place, and time. He appears well-developed and well-nourished. No distress.   HENT:   Head: Normocephalic and atraumatic.   Eyes: Conjunctivae are normal.   Neck: Neck supple.   Cardiovascular: Normal rate and regular rhythm.   Pulses:       Radial pulses are 2+ on the right side, and 2+ on the left side.        Femoral pulses are 2+ on the right side, and 2+ on the left side.       Dorsalis pedis pulses are Detected w/ doppler on the right side.         Posterior tibial pulses are Detected w/ doppler on the right side, and Detected w/ doppler on the left side.   L DP and AT dopplerable  L popliteal dopplerable   Pulmonary/Chest: Effort normal. No respiratory distress.   Abdominal: Soft. He exhibits no distension and no mass. There is no tenderness. There is no rebound and no guarding. No hernia.   Musculoskeletal: He exhibits edema and tenderness. He exhibits no deformity.   Improved ROM L foot   Feet:   Right Foot:   Skin Integrity: Negative for ulcer.   Left Foot:   Skin Integrity: Negative for ulcer, blister, skin breakdown or dry skin.   Neurological: He is alert and oriented to person, place, and time. No sensory deficit.   Skin: Skin is warm and dry. Capillary refill takes 2 to 3 seconds. No rash noted. He is not diaphoretic. No erythema. No pallor.   Psychiatric: He has a normal mood and affect.   Vitals reviewed.      Significant Labs:  All pertinent labs from the last 24 hours have been reviewed.    Significant Diagnostics:  I have reviewed all pertinent imaging results/findings within the past 24 hours.    Assessment/Plan:     * Critical lower limb ischemia  -s/p L iliac stent 8/6/19 - recovering well with improved symptoms  -Rec PT and possible SNF/rehab for continued therapy and recovery  -Low Na diet  -ASA and Plavix daily  -Rec statin therapy  -Heart healthy lifestyle  -Cont smoking cessation    Tobacco abuse  I discussed smoking cessation at length with this patient, including treatment options of nicotine replacement therapy and management of the addiction with assistance by [facility].  The patient states understanding that continued smoking will increase the chances of stenosis progression and other cardiovascular morbidity.      Hyperlipidemia  rec statin    DM (diabetes mellitus) type II, controlled, with peripheral vascular disorder  Glycemic control    Essential hypertension  BP control        Cj Pyle,  MD  Vascular Surgery  [facility]

## 2019-08-07 NOTE — PLAN OF CARE
Problem: Adult Inpatient Plan of Care  Goal: Plan of Care Review  Outcome: Ongoing (interventions implemented as appropriate)  Pt free from falls, injury or any further trauma throughout shift. Pt AAOx4. Continued medications as ordered. Complaints of pain throughout shift, moderately controlled with medications. Monitoring CBG, insulin PRN. Pt in no distress, will cont to monitor.    08/07/19 1362   Plan of Care Review   Plan of Care Reviewed With patient

## 2019-08-07 NOTE — NURSING
Patient back in room via bed with transport from IR. Resting in bed flat as ordered. Site to groin c/d/i with gauze and transparent dressing in place. Patient AAOX4. Safety measures in place. Call light in reach.

## 2019-08-07 NOTE — PLAN OF CARE
Problem: Occupational Therapy Goal  Goal: Occupational Therapy Goal  Goals to be met by: 08/21/19     Patient will increase functional independence with ADLs by performing:    UE Dressing with Modified [place].  LE Dressing with Supervision.  Grooming while seated with Set-up Assistance.  Toileting from bedside commode with Stand-by Assistance for hygiene and clothing management.   Sitting at edge of bed x10 minutes with Supervision.  Step transfer with Stand-by Assistance  Toilet transfer to bedside commode with Stand-by Assistance.  Upper extremity exercise program x15 reps per handout, with independence.    Outcome: Ongoing (interventions implemented as appropriate)  Pt will continue to benefit from acute OT in order to regain PLOF. Pt was independent PTA and now is a high fall risk with decreased independence with ADLs, all aspects of functional mobility, and decreased functional activity tolerance; therefore, pt will benefit from inpatient rehab at d/c.

## 2019-08-07 NOTE — ASSESSMENT & PLAN NOTE
-s/p L iliac stent 8/6/19 - recovering well with improved symptoms  -Rec PT and possible SNF/rehab for continued therapy and recovery  -Low Na diet  -ASA and Plavix daily  -Rec statin therapy  -Heart healthy lifestyle  -Cont smoking cessation

## 2019-08-07 NOTE — ASSESSMENT & PLAN NOTE
Patient with severe PVD that is symtomatic  Treated with heparin gtt  Plan for stenting today by Vascular

## 2019-08-07 NOTE — OP NOTE
DATE OF PROCEDURE:  08/06/2019.    SERVICE:  Vascular Surgery.    SURGEON:  Dr. Cj Pyle.    ASSISTANT:  None.    PREOPERATIVE DIAGNOSES:  1.  Left lower extremity atherosclerosis with critical limb ischemia and   ischemic rest pain.  2.  Hypertension.  3.  Hyperlipidemia.  4.  Diabetes mellitus.  5.  Tobacco abuse.    POSTOPERATIVE DIAGNOSES:  1.  Left lower extremity atherosclerosis with critical limb ischemia and   ischemic rest pain.  2.  Hypertension.  3.  Hyperlipidemia.  4.  Diabetes mellitus.  5.  Tobacco abuse.    PROCEDURES PERFORMED:  1.  Ultrasound-guided left common femoral artery percutaneous access.  2.  Left femoral angiogram.  3.  Left iliac angiogram.  4.  Left common iliac with a 7 x 60 mm LifeStent.  5.  Left external iliac artery stent placement with a 7 x 40 mm LifeStent.  6.  Moderate sedation.  7.  5-[place]onese Mynx closure, left common femoral artery.    I was present for the entire procedure and monitored the patient's hemodynamics   during the moderate sedation.  Please review the nurse's log for medication,   route and frequency.    MODERATE SEDATION TIME:  80 minutes.    ANESTHESIA:  Moderate sedation.    ESTIMATED BLOOD LOSS:  Less than 5 mL    COMPLICATIONS:  None.    DESCRIPTION OF PROCEDURE:  The patient was seen preoperatively and was deemed   stable for surgery.  He was brought to the procedure room, placed supine on the   procedure table.  He was prepped and draped in sterile fashion.  Timeout was   performed.  Next, an ultrasound was used to access the left common femoral   artery percutaneously with micropuncture technique.  There was noted to be   heavily calcified posterior plaque and access was obtained proximal to this.  We   then performed a left femoral angiogram confirming adequate placement.  We then   used a Glidewire and placed this just distal to the iliac occlusion and   advanced a 5-Gabonese sheath into the left common femoral artery.  We then   performed a left  iliac retrograde angiogram and marked the level of severe   stenosis and occlusion.  We then systemically heparinized the patient with 5000   units of heparin, allowed to circulate for 2 minutes.  We then crossed the   lesion with a floppy Glidewire and a Glidecath and confirmed to be true lumen.    We then placed our 7 x 60 mm stent.  All the stent migrated proximally   approximately 1 cm.  We post-molded the balloon with a 6 x 60 mm [place] balloon   although due to the stent ending in a curved aspect of the artery, we extended   the stent further into the external iliac artery with a 7 x 40 mm LifeStent.  We   then post-molded the balloon similarly as previously mentioned.  We then   performed a repeat left iliac angiogram showing patency of the stent and   markedly improved flow in the left iliofemoral system without any evidence of   embolization after a femoral angiogram was also performed.  The patient was then   given 5 mg of protamine without any hemodynamic changes noted.  We then   reversed with a total of 25 mg of protamine slowly without hemodynamic changes.    A 5-[place] Mynx closure was performed in the left common femoral artery.    I then applied manual pressure for 20 minutes due to oozing noted.    At the conclusion of 20-minute, there was no hematoma noted.  A sterile   dressing was applied to the left femoral access site and the patient was noted   to have a strong 2+ palpable left femoral pulse and a triphasic left PT signal,   which was improved from preoperatively.  The patient had normal motor and   sensory function of his foot.  The patient was then transferred to [facility] and   subsequently back to his hospital room in stable condition.      KIAH/SHERLY  dd: 08/06/2019 19:12:52 (CDT)  td: 08/06/2019 19:52:46 (CDT)  Doc ID   #[id number]  Job ID #[id number]    CC:

## 2019-08-07 NOTE — PT/OT/SLP EVAL
Physical Therapy Evaluation    Patient Name:  Jerry Galeana   MRN:  [id number]    Recommendations:     Discharge Recommendations:  rehabilitation facility   Discharge Equipment Recommendations: (ongoing assessment pending pt progress)   Barriers to discharge: Inaccessible home and Decreased caregiver support    Assessment:     Jerry Galeana is a 59 y.o. male admitted with a medical diagnosis of Critical lower limb ischemia.  He presents with the following impairments/functional limitations:  impaired self care skills, gait instability, impaired endurance, impaired balance, impaired functional mobilty, decreased safety awareness, pain, decreased ROM, weakness Pt limited by severe pain and inability to WB through L LE.    Rehab Prognosis: Good; patient would benefit from acute skilled PT services to address these deficits and reach maximum level of function.    Recent Surgery: * No surgery found *      Plan:     During this hospitalization, patient to be seen 6 x/week to address the identified rehab impairments via gait training, therapeutic activities, therapeutic exercises, wheelchair management/training and progress toward the following goals:    · Plan of Care Expires:  08/21/19    Subjective     Chief Complaint: pain  Patient/Family Comments/goals: to be able to walk and decreased pain  Pain/Comfort:  · Pain Rating 1: 9/10  · Location 1: (L foot/ankle)  · Pain Addressed 1: Pre-medicate for activity, Reposition, Distraction, Cessation of Activity, Nurse notified  · Pain Rating Post-Intervention 1: 10/10    Patients cultural, spiritual, Latter day conflicts given the current situation: no    Living Environment:  Pt lives alone in a [facility] with 0STE  Prior to admission, patients level of function was Independent, ambulating to take public transportation.  Equipment used at home: cane, straight.  DME owned (not currently used): none.  Upon discharge, patient will have assistance from ?.    Objective:     Communicated with  nsg prior to session.  Patient found HOB elevated with bed alarm  upon PT entry to room.    General Precautions: Standard, fall   Orthopedic Precautions:N/A   Braces: N/A     Exams:  · Cognitive Exam:  Patient is oriented to Person, Place, Time and Situation  · Fine Motor Coordination:    · Gross Motor Coordination:  mildly impaired 2/2 gen weakness and pain  · Postural Exam:  Patient presented with the following abnormalities:    · -       Rounded shoulders  · -       Forward head  · Sensation:    · -       Intact  light/touch B LE's  · Skin Integrity/Edema:      · -       Skin integrity: Visible skin intact  · RLE ROM: WFL  · RLE Strength: WFL  · LLE ROM: PROM Dflx -5* with pain  · LLE Strength: Dflx trace, others WFL's    Functional Mobility:  · Bed Mobility:     · Scooting: supervision  · Supine to Sit: stand by assistance  · Transfers:     · Sit to Stand:  minimum assistance with rolling walker and and VC for hand placement safey  · Gait: 2 steps forward/backward with RW and Min A.  Pt unable to ambulate further 2/2 extreme pain with L LE in dependnet position.  Pt ambulating NWB 2/2 unable to touch L foot down due to pain  · Balance: Fair+ sit, Fair- stand      Therapeutic Activities and Exercises:   eval only    AM-PAC 6 CLICK MOBILITY  Total Score:16     Patient left HOB elevated with call button in reach, bed alarm on, nsg notified and L LE elevated.    GOALS:   Multidisciplinary Problems     Physical Therapy Goals        Problem: Physical Therapy Goal    Goal Priority Disciplines Outcome Goal Variances Interventions   Physical Therapy Goal     PT, PT/OT Ongoing (interventions implemented as appropriate)     Description:  Goals to be met by: 2019     Patient will increase functional independence with mobility by performin. Supine to sit with Modified [place]  2. Sit to stand transfer with Modified [place]  3. Bed to chair transfer with Modified [place]   4. Gait  x 25-50' feet  with Modified [place] using Rolling Walker.   5. Wheelchair propulsion x150 feet with Modified [place] using bilateral uppper extremities  6. Lower extremity exercise program x10 reps per handout, with independence                      History:     Past Medical History:   Diagnosis Date    Cigarette smoker     Diabetes mellitus     History of alcohol abuse     Hyperlipidemia     Hypertension     PVD (peripheral vascular disease)     Seizures        Past Surgical History:   Procedure Laterality Date    BRAIN SURGERY      patient states that he had surgery for seizures       Time Tracking:     PT Received On: 08/07/19  PT Start Time: 1145     PT Stop Time: 1158  PT Total Time (min): 13 min     Billable Minutes: Evaluation 13      Quyen Nuñez, PT  08/07/2019

## 2019-08-07 NOTE — SUBJECTIVE & OBJECTIVE
Medications:  Continuous Infusions:   sodium chloride 0.9% 125 mL/hr at 08/07/19 0443     Scheduled Meds:   amLODIPine  10 mg Oral Daily    aspirin  81 mg Oral Daily    clopidogrel  75 mg Oral Daily    gabapentin  300 mg Oral TID    pantoprazole  40 mg Oral Daily    pravastatin  40 mg Oral QHS    tamsulosin  0.4 mg Oral Daily     PRN Meds:acetaminophen, cloNIDine, dextrose 50%, glucagon (human recombinant), HYDROmorphone, insulin aspart U-100, ondansetron, oxyCODONE, sodium chloride 0.9%     Objective:     Vital Signs (Most Recent):  Temp: 97.6 °F (36.4 °C) (08/07/19 0444)  Pulse: 99 (08/07/19 0444)  Resp: 16 (08/07/19 0444)  BP: (!) 157/70 (08/07/19 0444)  SpO2: 99 % (08/07/19 0444) Vital Signs (24h Range):  Temp:  [97.6 °F (36.4 °C)-98.6 °F (37 °C)] 97.6 °F (36.4 °C)  Pulse:  [73-99] 99  Resp:  [12-20] 16  SpO2:  [95 %-100 %] 99 %  BP: (123-158)/(60-78) 157/70     Date 08/07/19 0700 - 08/08/19 0659   Shift 7118-5261 6220-1964 9848-0517 24 Hour Total   INTAKE   Shift Total(mL/kg)       OUTPUT   Urine(mL/kg/hr) 350   350   Shift Total(mL/kg) 350(7.1)   350(7.1)   Weight (kg) 49.4 49.4 49.4 49.4       Physical Exam   Constitutional: He is oriented to person, place, and time. He appears well-developed and well-nourished. No distress.   HENT:   Head: Normocephalic and atraumatic.   Eyes: Conjunctivae are normal.   Neck: Neck supple.   Cardiovascular: Normal rate and regular rhythm.   Pulses:       Radial pulses are 2+ on the right side, and 2+ on the left side.        Femoral pulses are 2+ on the right side, and 2+ on the left side.       Dorsalis pedis pulses are Detected w/ doppler on the right side.        Posterior tibial pulses are Detected w/ doppler on the right side, and Detected w/ doppler on the left side.   L popliteal dopplerable   Pulmonary/Chest: Effort normal. No respiratory distress.   Abdominal: Soft. He exhibits no distension and no mass. There is no tenderness. There is no rebound and no  guarding. No hernia.   Musculoskeletal: He exhibits edema and tenderness. He exhibits no deformity.   Improved ROM L foot   Feet:   Right Foot:   Skin Integrity: Negative for ulcer.   Left Foot:   Skin Integrity: Negative for ulcer, blister, skin breakdown or dry skin.   Neurological: He is alert and oriented to person, place, and time. No sensory deficit.   Skin: Skin is warm and dry. Capillary refill takes 2 to 3 seconds. No rash noted. He is not diaphoretic. No erythema. No pallor.   Psychiatric: He has a normal mood and affect.   Vitals reviewed.      Significant Labs:  All pertinent labs from the last 24 hours have been reviewed.    Significant Diagnostics:  I have reviewed all pertinent imaging results/findings within the past 24 hours.

## 2019-08-08 DIAGNOSIS — I73.9 PAD (PERIPHERAL ARTERY DISEASE): Primary | ICD-10-CM

## 2019-08-08 LAB
ANION GAP SERPL CALC-SCNC: 5 MMOL/L (ref 8–16)
BASOPHILS # BLD AUTO: 0.02 K/UL (ref 0–0.2)
BASOPHILS NFR BLD: 0.2 % (ref 0–1.9)
BUN SERPL-MCNC: 9 MG/DL (ref 8–23)
CALCIUM SERPL-MCNC: 8.5 MG/DL (ref 8.7–10.5)
CHLORIDE SERPL-SCNC: 108 MMOL/L (ref 95–110)
CO2 SERPL-SCNC: 25 MMOL/L (ref 23–29)
CREAT SERPL-MCNC: 0.7 MG/DL (ref 0.5–1.4)
DIFFERENTIAL METHOD: ABNORMAL
EOSINOPHIL # BLD AUTO: 0.2 K/UL (ref 0–0.5)
EOSINOPHIL NFR BLD: 1.3 % (ref 0–8)
ERYTHROCYTE [DISTWIDTH] IN BLOOD BY AUTOMATED COUNT: 13.8 % (ref 11.5–14.5)
EST. GFR  (AFRICAN AMERICAN): >60 ML/MIN/1.73 M^2
EST. GFR  (NON AFRICAN AMERICAN): >60 ML/MIN/1.73 M^2
GLUCOSE SERPL-MCNC: 217 MG/DL (ref 70–110)
HCT VFR BLD AUTO: 36.9 % (ref 40–54)
HGB BLD-MCNC: 12 G/DL (ref 14–18)
LYMPHOCYTES # BLD AUTO: 2.3 K/UL (ref 1–4.8)
LYMPHOCYTES NFR BLD: 20.6 % (ref 18–48)
MCH RBC QN AUTO: 30.2 PG (ref 27–31)
MCHC RBC AUTO-ENTMCNC: 32.5 G/DL (ref 32–36)
MCV RBC AUTO: 93 FL (ref 82–98)
MONOCYTES # BLD AUTO: 2 K/UL (ref 0.3–1)
MONOCYTES NFR BLD: 17.7 % (ref 4–15)
NEUTROPHILS # BLD AUTO: 6.7 K/UL (ref 1.8–7.7)
NEUTROPHILS NFR BLD: 60.6 % (ref 38–73)
PLATELET # BLD AUTO: 300 K/UL (ref 150–350)
PMV BLD AUTO: 11.3 FL (ref 9.2–12.9)
POCT GLUCOSE: 156 MG/DL (ref 70–110)
POCT GLUCOSE: 163 MG/DL (ref 70–110)
POCT GLUCOSE: 186 MG/DL (ref 70–110)
POCT GLUCOSE: 198 MG/DL (ref 70–110)
POTASSIUM SERPL-SCNC: 4.1 MMOL/L (ref 3.5–5.1)
RBC # BLD AUTO: 3.97 M/UL (ref 4.6–6.2)
SODIUM SERPL-SCNC: 138 MMOL/L (ref 136–145)
WBC # BLD AUTO: 11.17 K/UL (ref 3.9–12.7)

## 2019-08-08 PROCEDURE — 80048 BASIC METABOLIC PNL TOTAL CA: CPT

## 2019-08-08 PROCEDURE — 99233 PR SUBSEQUENT HOSPITAL CARE,LEVL III: ICD-10-PCS | Mod: ,,, | Performed by: SURGERY

## 2019-08-08 PROCEDURE — 99233 SBSQ HOSP IP/OBS HIGH 50: CPT | Mod: ,,, | Performed by: SURGERY

## 2019-08-08 PROCEDURE — 63600175 PHARM REV CODE 636 W HCPCS: Performed by: EMERGENCY MEDICINE

## 2019-08-08 PROCEDURE — 85025 COMPLETE CBC W/AUTO DIFF WBC: CPT

## 2019-08-08 PROCEDURE — 63600175 PHARM REV CODE 636 W HCPCS: Performed by: HOSPITALIST

## 2019-08-08 PROCEDURE — 97110 THERAPEUTIC EXERCISES: CPT

## 2019-08-08 PROCEDURE — 36415 COLL VENOUS BLD VENIPUNCTURE: CPT

## 2019-08-08 PROCEDURE — 25000003 PHARM REV CODE 250: Performed by: HOSPITALIST

## 2019-08-08 PROCEDURE — 94761 N-INVAS EAR/PLS OXIMETRY MLT: CPT

## 2019-08-08 PROCEDURE — 25000003 PHARM REV CODE 250: Performed by: EMERGENCY MEDICINE

## 2019-08-08 PROCEDURE — 11000001 HC ACUTE MED/SURG PRIVATE ROOM

## 2019-08-08 PROCEDURE — 97116 GAIT TRAINING THERAPY: CPT

## 2019-08-08 PROCEDURE — 25000003 PHARM REV CODE 250: Performed by: SURGERY

## 2019-08-08 PROCEDURE — 97530 THERAPEUTIC ACTIVITIES: CPT

## 2019-08-08 RX ADMIN — OXYCODONE HYDROCHLORIDE 10 MG: 5 TABLET ORAL at 01:08

## 2019-08-08 RX ADMIN — ASPIRIN 81 MG 81 MG: 81 TABLET ORAL at 09:08

## 2019-08-08 RX ADMIN — PRAVASTATIN SODIUM 40 MG: 40 TABLET ORAL at 08:08

## 2019-08-08 RX ADMIN — PANTOPRAZOLE SODIUM 40 MG: 40 TABLET, DELAYED RELEASE ORAL at 09:08

## 2019-08-08 RX ADMIN — HYDROMORPHONE HYDROCHLORIDE 1 MG: 2 INJECTION, SOLUTION INTRAMUSCULAR; INTRAVENOUS; SUBCUTANEOUS at 02:08

## 2019-08-08 RX ADMIN — CLOPIDOGREL BISULFATE 75 MG: 75 TABLET ORAL at 09:08

## 2019-08-08 RX ADMIN — GABAPENTIN 300 MG: 300 CAPSULE ORAL at 02:08

## 2019-08-08 RX ADMIN — AMLODIPINE BESYLATE 10 MG: 5 TABLET ORAL at 09:08

## 2019-08-08 RX ADMIN — SODIUM CHLORIDE: 0.9 INJECTION, SOLUTION INTRAVENOUS at 10:08

## 2019-08-08 RX ADMIN — OXYCODONE HYDROCHLORIDE 10 MG: 5 TABLET ORAL at 09:08

## 2019-08-08 RX ADMIN — HYDROMORPHONE HYDROCHLORIDE 1 MG: 2 INJECTION, SOLUTION INTRAMUSCULAR; INTRAVENOUS; SUBCUTANEOUS at 04:08

## 2019-08-08 RX ADMIN — GABAPENTIN 300 MG: 300 CAPSULE ORAL at 09:08

## 2019-08-08 RX ADMIN — GABAPENTIN 300 MG: 300 CAPSULE ORAL at 08:08

## 2019-08-08 RX ADMIN — SODIUM CHLORIDE: 0.9 INJECTION, SOLUTION INTRAVENOUS at 12:08

## 2019-08-08 RX ADMIN — TAMSULOSIN HYDROCHLORIDE 0.4 MG: 0.4 CAPSULE ORAL at 09:08

## 2019-08-08 RX ADMIN — OXYCODONE HYDROCHLORIDE 10 MG: 5 TABLET ORAL at 08:08

## 2019-08-08 NOTE — CONSULTS
Pt is a potental discharge today; will discharge with home health with a Skilled Nurse; possible DME.    1111 The recommendation per PT/OT is Inpt Rehab. TN sent clinicals via [facility] to 5 facilities including [facility]; awaiting review and acceptance.

## 2019-08-08 NOTE — PROGRESS NOTES
1041 TN contacted PCP's office, Dr. Babak Bryan, at [phone]; spoke with Brenda. appt scheduled on 08/16/19 at 12:30 PM.    1110 TN sent clinicals via [facility] to 5 Inpt Rehab Facilities; including [facility]; awaiting review and acceptance.    1450 TN met with pt to discuss Inpt Rehab and preference of facility. TN discussed [facility] facility, [facility]; pt declined. Pt prefers to stay on the [facility], prefers [place] or [facility].. [facility] met with the pt as well.

## 2019-08-08 NOTE — ASSESSMENT & PLAN NOTE
Patient with severe PVD that is symtomatic  Treated with heparin gtt, now stopped  S/p left common iliac stent, and external iliac artery stent placed by vascular surgery on 08/06/2019 with improved perfusion to the extremity  Patient started on aspirin and Plavix  However he has significant pain to the left leg  Will consult PT and OT  He declines sniff placement

## 2019-08-08 NOTE — ASSESSMENT & PLAN NOTE
-s/p L iliac stent 8/6/19 - recovering well with improved symptoms  -Cont PT and eval for SNF/rehab  -Low Na diet  -ASA and Plavix daily  -cont statin  -Heart healthy lifestyle  -Cont smoking cessation  -RTC 3-4 weeks with CHRISTIE and LLE arterial US

## 2019-08-08 NOTE — PLAN OF CARE
Problem: Occupational Therapy Goal  Goal: Occupational Therapy Goal  Goals to be met by: 08/21/19     Patient will increase functional independence with ADLs by performing:    UE Dressing with Modified [place].  LE Dressing with Supervision.  Grooming while seated with Set-up Assistance.  Toileting from bedside commode with Stand-by Assistance for hygiene and clothing management.   Sitting at edge of bed x10 minutes with Supervision.  Step transfer with Stand-by Assistance  Toilet transfer to bedside commode with Stand-by Assistance.  Upper extremity exercise program x15 reps per handout, with independence.     Outcome: Ongoing (interventions implemented as appropriate)  Pt will continue to benefit from acute OT in order to increase safety and independence with ADLs and all aspects of functional mobility. Pt has increased LLE pain, unable to WB at all to LLE. Pt is a high fall risk and required MIN A to transfer from bed>chair with RW and unable to ambulate any farther. Pt was I PTA and motivated to participate in order to achieve PLOF. For these reasons, OT rec. Inpatient rehab at d/c.

## 2019-08-08 NOTE — SUBJECTIVE & OBJECTIVE
Medications:  Continuous Infusions:   sodium chloride 0.9% 125 mL/hr at 08/08/19 0000     Scheduled Meds:   amLODIPine  10 mg Oral Daily    aspirin  81 mg Oral Daily    clopidogrel  75 mg Oral Daily    gabapentin  300 mg Oral TID    pantoprazole  40 mg Oral Daily    pravastatin  40 mg Oral QHS    tamsulosin  0.4 mg Oral Daily     PRN Meds:acetaminophen, cloNIDine, dextrose 50%, glucagon (human recombinant), HYDROmorphone, insulin aspart U-100, ondansetron, oxyCODONE, sodium chloride 0.9%     Objective:     Vital Signs (Most Recent):  Temp: 98.3 °F (36.8 °C) (08/08/19 1156)  Pulse: 96 (08/08/19 1156)  Resp: 17 (08/08/19 1156)  BP: (!) 176/74 (08/08/19 1156)  SpO2: 96 % (08/08/19 1156) Vital Signs (24h Range):  Temp:  [97.9 °F (36.6 °C)-98.5 °F (36.9 °C)] 98.3 °F (36.8 °C)  Pulse:  [88-96] 96  Resp:  [17-18] 17  SpO2:  [96 %-98 %] 96 %  BP: (140-181)/(58-78) 176/74     Date 08/08/19 0700 - 08/09/19 0659   Shift 1858-0229 5157-1989 2530-0111 24 Hour Total   INTAKE   P.O. 240   240   Shift Total(mL/kg) 240(4.9)   240(4.9)   OUTPUT   Shift Total(mL/kg)       Weight (kg) 49.4 49.4 49.4 49.4       Physical Exam   Constitutional: He is oriented to person, place, and time. He appears well-developed and well-nourished. No distress.   HENT:   Head: Normocephalic and atraumatic.   Eyes: Conjunctivae are normal.   Neck: Neck supple.   Cardiovascular: Normal rate and regular rhythm.   Pulses:       Radial pulses are 2+ on the right side, and 2+ on the left side.        Femoral pulses are 2+ on the right side, and 2+ on the left side.       Dorsalis pedis pulses are Detected w/ doppler on the right side, and Detected w/ doppler on the left side.        Posterior tibial pulses are Detected w/ doppler on the right side, and Detected w/ doppler on the left side.   L popliteal dopplerable   Pulmonary/Chest: Effort normal. No respiratory distress.   Abdominal: Soft. He exhibits no distension and no mass. There is no  tenderness. There is no rebound and no guarding. No hernia.   Musculoskeletal: He exhibits edema and tenderness. He exhibits no deformity.   Improved ROM L foot   Feet:   Right Foot:   Skin Integrity: Negative for ulcer.   Left Foot:   Skin Integrity: Negative for ulcer, blister, skin breakdown or dry skin.   Neurological: He is alert and oriented to person, place, and time. No sensory deficit.   Skin: Skin is warm and dry. Capillary refill takes 2 to 3 seconds. No rash noted. He is not diaphoretic. No erythema. No pallor.   Psychiatric: He has a normal mood and affect.   Vitals reviewed.      Significant Labs:  All pertinent labs from the last 24 hours have been reviewed.    Significant Diagnostics:  I have reviewed all pertinent imaging results/findings within the past 24 hours.

## 2019-08-08 NOTE — SUBJECTIVE & OBJECTIVE
Interval History: Pt report pain to left leg still but now the leg is overall warmer    Review of Systems   Constitutional: Negative for chills and fever.   Respiratory: Negative for shortness of breath.    Cardiovascular: Negative for chest pain.     Objective:     Vital Signs (Most Recent):  Temp: 98.2 °F (36.8 °C) (08/07/19 2240)  Pulse: 90 (08/07/19 2240)  Resp: 18 (08/07/19 2240)  BP: (!) 157/69 (08/07/19 2240)  SpO2: 96 % (08/07/19 2240) Vital Signs (24h Range):  Temp:  [97.6 °F (36.4 °C)-98.5 °F (36.9 °C)] 98.2 °F (36.8 °C)  Pulse:  [90-99] 90  Resp:  [16-19] 18  SpO2:  [95 %-99 %] 96 %  BP: (140-175)/(58-72) 157/69     Weight: 49.4 kg (108 lb 14.5 oz)  Body mass index is 18.12 kg/m².    Intake/Output Summary (Last 24 hours) at 8/8/2019 0132  Last data filed at 8/8/2019 0000  Gross per 24 hour   Intake 2460 ml   Output 3200 ml   Net -740 ml      Physical Exam   Constitutional: He is oriented to person, place, and time. No distress.   HENT:   Head: Atraumatic.   Eyes: Pupils are equal, round, and reactive to light. EOM are normal.   Neck: Normal range of motion. Neck supple.   Cardiovascular: Normal rate and regular rhythm.   Pulmonary/Chest: Effort normal and breath sounds normal.   Abdominal: Soft.   Musculoskeletal: Normal range of motion. He exhibits tenderness. He exhibits no deformity.   Left leg slightly cooler to touch compared with right, no longer cyanotic in appearance and warmer to touch compared to yesterday, but still with significant pain   Neurological: He is oriented to person, place, and time. No cranial nerve deficit. Coordination normal.   Skin: Skin is warm.   Psychiatric: He has a normal mood and affect. His behavior is normal.       Significant Labs: All pertinent labs within the past 24 hours have been reviewed.    Significant Imaging: I have reviewed and interpreted all pertinent imaging results/findings within the past 24 hours.

## 2019-08-08 NOTE — PLAN OF CARE
Problem: Adult Inpatient Plan of Care  Goal: Plan of Care Review  Outcome: Ongoing (interventions implemented as appropriate)     08/08/19 9339   Plan of Care Review   Plan of Care Reviewed With patient   AAOX4, free from fall or ionjury. Patient c/o pain that is moderately relieved by pain medication. Remains on IVF. Scheduled meds administered as ordered. Blood glucose monitored. Voiding via urinal. Bed in low locked position, call light in reach. Will continue to monitor.

## 2019-08-08 NOTE — PLAN OF CARE
Problem: Physical Therapy Goal  Goal: Physical Therapy Goal  Goals to be met by: 2019     Patient will increase functional independence with mobility by performin. Supine to sit with Modified [place]  2. Sit to stand transfer with Modified [place]  3. Bed to chair transfer with Modified [place]   4. Gait  x 25-50' feet with Modified [place] using Rolling Walker.   5. Wheelchair propulsion x150 feet with Modified [place] using bilateral uppper extremities  6. Lower extremity exercise program x10 reps per handout, with independence     Outcome: Ongoing (interventions implemented as appropriate)  Pt will benefit from IPR in order to return to PLOF.

## 2019-08-08 NOTE — PLAN OF CARE
Problem: Adult Inpatient Plan of Care  Goal: Plan of Care Review  Outcome: Ongoing (interventions implemented as appropriate)  Pt free from falls, injury or any further trauma throughout shift. Pt AAOx4. Continued medications as ordered. Complaints of pain throughout shift, moderately controlled with medications. Monitoring CBG. Pt in no distress, will cont to monitor.    08/08/19 5830   Plan of Care Review   Plan of Care Reviewed With patient

## 2019-08-08 NOTE — PROGRESS NOTES
[facility]  Vascular Surgery  Progress Note    Patient Name: Jerry Galeana  MRN: [id number]  Admission Date: 8/5/2019  Primary Care Provider: Babak Bryan MD    Subjective:     Interval History: L foot feels much better today with improved ROM.  Has been working with PT.    Post-Op Info:  * No surgery found *           Medications:  Continuous Infusions:   sodium chloride 0.9% 125 mL/hr at 08/08/19 0000     Scheduled Meds:   amLODIPine  10 mg Oral Daily    aspirin  81 mg Oral Daily    clopidogrel  75 mg Oral Daily    gabapentin  300 mg Oral TID    pantoprazole  40 mg Oral Daily    pravastatin  40 mg Oral QHS    tamsulosin  0.4 mg Oral Daily     PRN Meds:acetaminophen, cloNIDine, dextrose 50%, glucagon (human recombinant), HYDROmorphone, insulin aspart U-100, ondansetron, oxyCODONE, sodium chloride 0.9%     Objective:     Vital Signs (Most Recent):  Temp: 98.3 °F (36.8 °C) (08/08/19 1156)  Pulse: 96 (08/08/19 1156)  Resp: 17 (08/08/19 1156)  BP: (!) 176/74 (08/08/19 1156)  SpO2: 96 % (08/08/19 1156) Vital Signs (24h Range):  Temp:  [97.9 °F (36.6 °C)-98.5 °F (36.9 °C)] 98.3 °F (36.8 °C)  Pulse:  [88-96] 96  Resp:  [17-18] 17  SpO2:  [96 %-98 %] 96 %  BP: (140-181)/(58-78) 176/74     Date 08/08/19 0700 - 08/09/19 0659   Shift 1181-0233 8645-4066 0604-3665 24 Hour Total   INTAKE   P.O. 240   240   Shift Total(mL/kg) 240(4.9)   240(4.9)   OUTPUT   Shift Total(mL/kg)       Weight (kg) 49.4 49.4 49.4 49.4       Physical Exam   Constitutional: He is oriented to person, place, and time. He appears well-developed and well-nourished. No distress.   HENT:   Head: Normocephalic and atraumatic.   Eyes: Conjunctivae are normal.   Neck: Neck supple.   Cardiovascular: Normal rate and regular rhythm.   Pulses:       Radial pulses are 2+ on the right side, and 2+ on the left side.        Femoral pulses are 2+ on the right side, and 2+ on the left side.       Dorsalis pedis pulses are Detected w/ doppler on the  right side, and Detected w/ doppler on the left side.        Posterior tibial pulses are Detected w/ doppler on the right side, and Detected w/ doppler on the left side.   L popliteal dopplerable   Pulmonary/Chest: Effort normal. No respiratory distress.   Abdominal: Soft. He exhibits no distension and no mass. There is no tenderness. There is no rebound and no guarding. No hernia.   Musculoskeletal: He exhibits edema and tenderness. He exhibits no deformity.   Improved ROM L foot   Feet:   Right Foot:   Skin Integrity: Negative for ulcer.   Left Foot:   Skin Integrity: Negative for ulcer, blister, skin breakdown or dry skin.   Neurological: He is alert and oriented to person, place, and time. No sensory deficit.   Skin: Skin is warm and dry. Capillary refill takes 2 to 3 seconds. No rash noted. He is not diaphoretic. No erythema. No pallor.   Psychiatric: He has a normal mood and affect.   Vitals reviewed.      Significant Labs:  All pertinent labs from the last 24 hours have been reviewed.    Significant Diagnostics:  I have reviewed all pertinent imaging results/findings within the past 24 hours.    Assessment/Plan:     * Critical lower limb ischemia  -s/p L iliac stent 8/6/19 - recovering well with improved symptoms  -Cont PT and eval for SNF/rehab  -Low Na diet  -ASA and Plavix daily  -cont statin  -Heart healthy lifestyle  -Cont smoking cessation  -RTC 3-4 weeks with CHRISTIE and LLE arterial US    Tobacco abuse  I discussed smoking cessation at length with this patient, including treatment options of nicotine replacement therapy and management of the addiction with assistance by [facility].  The patient states understanding that continued smoking will increase the chances of stenosis progression and other cardiovascular morbidity.      Hyperlipidemia  rec statin    DM (diabetes mellitus) type II, controlled, with peripheral vascular disorder  Glycemic control    Essential hypertension  BP  control        Cj Pyle MD  Vascular Surgery  [facility]

## 2019-08-08 NOTE — PT/OT/SLP PROGRESS
"Physical Therapy Treatment    Patient Name:  Jerry Galeana   MRN:  [id number]    Recommendations:     Discharge Recommendations:  rehabilitation facility   Discharge Equipment Recommendations: (ongoing assessment pending pt progress)   Barriers to discharge: Decreased caregiver support, pt with decreased safety awareness.     Assessment:     Jerry Galeana is a 59 y.o. male admitted with a medical diagnosis of Critical lower limb ischemia.  He presents with the following impairments/functional limitations:  weakness, impaired endurance, gait instability, impaired balance, decreased upper extremity function, decreased lower extremity function, decreased ROM, edema, pain, decreased safety awareness .    Rehab Prognosis: Good; patient would benefit from acute skilled PT services to address these deficits and reach maximum level of function.    Recent Surgery: * No surgery found *      Plan:     During this hospitalization, patient to be seen 6 x/week to address the identified rehab impairments via gait training, therapeutic activities, therapeutic exercises, wheelchair management/training and progress toward the following goals:    · Plan of Care Expires:  08/21/19    Subjective     Chief Complaint: LLE pain   Patient/Family Comments/goals:  " [facility] "   Pain/Comfort:  · Pain Rating 1: 9/10  · Location - Side 1: Left  · Location 1: foot  · Pain Addressed 1: Pre-medicate for activity, Nurse notified  · Pain Rating Post-Intervention 1: 9/10      Objective:     Communicated with nurse Carpenter  prior to session.  Patient found HOB elevated with bed alarm upon PT entry to room.     General Precautions: Standard, fall, diabetic   Orthopedic Precautions:N/A   Braces: N/A     Functional Mobility:  · Bed Mobility:     · Scooting: stand by assistance  · Supine to Sit: stand by assistance  · Sit to Supine: stand by assistance  · Transfers:     · Sit to Stand:  contact guard assistance and minimum " assistance with rolling walker. V/T cues for safety technique and walker management   · Gait:  Pt ambulated ~ 10 ft with RW, MIN A . Pt demonstrated LLE NWB gait 2* pain (increased pain with dependent position )  . Pt with poor safety awareness ( is impulsive ) , required frequent V/T cues for safety technique and walker management  . Pt is a high fall risk .     · Balance:  Fair-      AM-PAC 6 CLICK MOBILITY  Turning over in bed (including adjusting bedclothes, sheets and blankets)?: 4  Sitting down on and standing up from a chair with arms (e.g., wheelchair, bedside commode, etc.): 3  Moving from lying on back to sitting on the side of the bed?: 4  Moving to and from a bed to a chair (including a wheelchair)?: 3  Need to walk in hospital room?: 3  Climbing 3-5 steps with a railing?: 2  Basic Mobility Total Score: 19       Therapeutic Activities and Exercises:   Pt performed in bed BLE 10 reps x 2 :  AP, SAQ, HS,  Hip abd/add , SLR and seated BLE 10 reps x 2 trials:   AP, LAQ, HS,  Hip abd/add . V/T's cues for technique and sequence.  Educated pt on safety awareness with all OOB mobility , transfer and gait training.     Patient left HOB elevated with LLE elevated on pillow  all lines intact, call button in reach, bed alarm on and nurse notified..    GOALS:   Multidisciplinary Problems     Physical Therapy Goals        Problem: Physical Therapy Goal    Goal Priority Disciplines Outcome Goal Variances Interventions   Physical Therapy Goal     PT, PT/OT Ongoing (interventions implemented as appropriate)     Description:  Goals to be met by: 2019     Patient will increase functional independence with mobility by performin. Supine to sit with Modified [place]  2. Sit to stand transfer with Modified [place]  3. Bed to chair transfer with Modified [place]   4. Gait  x 25-50' feet with Modified [place] using Rolling Walker.   5. Wheelchair propulsion x150 feet with Modified [place]  using bilateral uppper extremities  6. Lower extremity exercise program x10 reps per handout, with independence                      Time Tracking:     PT Received On: 08/08/19  PT Start Time: 1520     PT Stop Time: 1543  PT Total Time (min): 23 min     Billable Minutes: Gait Training 11 and Therapeutic Exercise 12    Treatment Type: Treatment  PT/PTA: PTA     PTA Visit Number: 1     Tram BUCK Lewish, PTA  08/08/2019

## 2019-08-08 NOTE — PROGRESS NOTES
[facility] Hospital Medicine  Progress Note    Patient Name: Jerry Galeana  MRN: [id number]  Patient Class: IP- Inpatient   Admission Date: 8/5/2019  Length of Stay: 3 days  Attending Physician: Chata Champagne MD  Primary Care Provider: Babak Bryan MD        Subjective:     Principal Problem:Critical lower limb ischemia      HPI:  This is a 59 y.o male patient, with a PMHx of diabetes mellitus, hypertension, and hyperlipidemia, presenting to the ED with a complaint of 10/10, sharp, chronic, left ankle and foot pain, numbness, and swelling, that began a few months ago, and worsened x4 days ago. He reports the pain at rest began yesterday. Patient states the pain is worse with movement. Patient reports he's having a surgery in x2 days on his left leg due to his atherosclerosis by , . Patient denies any fever, chills, shortness of breath, chest pain, neck pain, back pain, abdominal pain, rash, headaches, congestion, rhinorrhea, cough, sore throat, ear pain, eye pain, blurred vision, nausea, vomiting, diarrhea, dysuria, or any other associated symptoms. No prior Tx.. He reports he stopped smoking cigarettes x1 week ago. evaluated patient in ER recommending heparin drip,US arterial and X ray and plan for revascularization AM.    Overview/Hospital Course:  No notes on file    Interval History: Pt report pain to left leg still but now the leg is overall warmer    Review of Systems   Constitutional: Negative for chills and fever.   Respiratory: Negative for shortness of breath.    Cardiovascular: Negative for chest pain.     Objective:     Vital Signs (Most Recent):  Temp: 98.2 °F (36.8 °C) (08/07/19 2240)  Pulse: 90 (08/07/19 2240)  Resp: 18 (08/07/19 2240)  BP: (!) 157/69 (08/07/19 2240)  SpO2: 96 % (08/07/19 2240) Vital Signs (24h Range):  Temp:  [97.6 °F (36.4 °C)-98.5 °F (36.9 °C)] 98.2 °F (36.8 °C)  Pulse:  [90-99] 90  Resp:  [16-19] 18  SpO2:  [95 %-99 %] 96 %  BP:  (140-175)/(58-72) 157/69     Weight: 49.4 kg (108 lb 14.5 oz)  Body mass index is 18.12 kg/m².    Intake/Output Summary (Last 24 hours) at 8/8/2019 0132  Last data filed at 8/8/2019 0000  Gross per 24 hour   Intake 2460 ml   Output 3200 ml   Net -740 ml      Physical Exam   Constitutional: He is oriented to person, place, and time. No distress.   HENT:   Head: Atraumatic.   Eyes: Pupils are equal, round, and reactive to light. EOM are normal.   Neck: Normal range of motion. Neck supple.   Cardiovascular: Normal rate and regular rhythm.   Pulmonary/Chest: Effort normal and breath sounds normal.   Abdominal: Soft.   Musculoskeletal: Normal range of motion. He exhibits tenderness. He exhibits no deformity.   Left leg slightly cooler to touch compared with right, no longer cyanotic in appearance and warmer to touch compared to yesterday, but still with significant pain   Neurological: He is oriented to person, place, and time. No cranial nerve deficit. Coordination normal.   Skin: Skin is warm.   Psychiatric: He has a normal mood and affect. His behavior is normal.       Significant Labs: All pertinent labs within the past 24 hours have been reviewed.    Significant Imaging: I have reviewed and interpreted all pertinent imaging results/findings within the past 24 hours.      Assessment/Plan:      * Critical lower limb ischemia  Patient with severe PVD that is symtomatic  Treated with heparin gtt, now stopped  S/p left common iliac stent, and external iliac artery stent placed by vascular surgery on 08/06/2019 with improved perfusion to the extremity  Patient started on aspirin and Plavix  However he has significant pain to the left leg  Will consult PT and OT  He declines SNF placement    Tobacco abuse  Smoking cessation counseling done for > 3 minutes    Hyperlipidemia  Continue statin.    BPH (benign prostatic hyperplasia)  Resume Flomax    DM (diabetes mellitus) type II, controlled, with peripheral vascular  disorder  Continue on SSI.    Essential hypertension  Resume home medication Norvasc and added prn clonidine.        VTE Risk Mitigation (From admission, onward)        Ordered     IP VTE HIGH RISK PATIENT  Once      08/05/19 4291                Chata Champagne MD  Department of Hospital Medicine   [facility]

## 2019-08-08 NOTE — PLAN OF CARE
"TN met with at bedside. TN explained her role in Care Management. Pt voiced understanding. TN inquired about HELP AT HOME. Pt stated that he will have friend at home to help for support. TN voiced understanding. TN inquired about responsibilities when it comes to  MANAGING HIS HEALTH at home and what it entails. Pt inquired about details. TN informed pt of RESPONSIBILITIES of:    1. Follow up appointments  2. Getting Prescriptions filled  3. Taking medications as prescribed.     Pt voiced understanding.  TN explained "My Health Packet" blue folder and the pink and green tabs that are on the folder as well. Pt voiced understanding.     Pt's pharmacy:     Pt's preference for appointments: no preference       08/07/19 1250   Discharge Assessment   Assessment Type Discharge Planning Assessment   Confirmed/corrected address and phone number on facesheet? No   Assessment information obtained from? Patient   Communicated expected length of stay with patient/caregiver no   Prior to hospitilization cognitive status: Alert/Oriented   Prior to hospitalization functional status: Independent   Current cognitive status: Alert/Oriented   Current Functional Status: Independent   Lives With friend(s)   Able to Return to Prior Arrangements yes   Is patient able to care for self after discharge? Unable to determine at this time (comments)   Who are your caregiver(s) and their phone number(s)?   (Friend, Mack Galeana, [phone])   Patient's perception of discharge disposition home or selfcare   Readmission Within the Last 30 Days no previous admission in last 30 days   Patient currently being followed by outpatient case management? No   Patient currently receives any other outside agency services? No   Equipment Currently Used at Home none   Do you have any problems affording any of your prescribed medications? No   Is the patient taking medications as prescribed? yes   Does the patient have transportation home? Yes "   Transportation Anticipated family or friend will provide   Does the patient receive services at the [facility]? No   Discharge Plan A Home   Discharge Plan B Home Health   DME Needed Upon Discharge  other (see comments)  (TBD)   Patient/Family in Agreement with Plan yes

## 2019-08-09 LAB
ANION GAP SERPL CALC-SCNC: 8 MMOL/L (ref 8–16)
BASOPHILS # BLD AUTO: 0.03 K/UL (ref 0–0.2)
BASOPHILS NFR BLD: 0.3 % (ref 0–1.9)
BUN SERPL-MCNC: 7 MG/DL (ref 8–23)
CALCIUM SERPL-MCNC: 9.1 MG/DL (ref 8.7–10.5)
CHLORIDE SERPL-SCNC: 106 MMOL/L (ref 95–110)
CO2 SERPL-SCNC: 27 MMOL/L (ref 23–29)
CREAT SERPL-MCNC: 0.7 MG/DL (ref 0.5–1.4)
DIFFERENTIAL METHOD: ABNORMAL
EOSINOPHIL # BLD AUTO: 0.2 K/UL (ref 0–0.5)
EOSINOPHIL NFR BLD: 1.8 % (ref 0–8)
ERYTHROCYTE [DISTWIDTH] IN BLOOD BY AUTOMATED COUNT: 13.9 % (ref 11.5–14.5)
EST. GFR  (AFRICAN AMERICAN): >60 ML/MIN/1.73 M^2
EST. GFR  (NON AFRICAN AMERICAN): >60 ML/MIN/1.73 M^2
GLUCOSE SERPL-MCNC: 168 MG/DL (ref 70–110)
HCT VFR BLD AUTO: 38.4 % (ref 40–54)
HGB BLD-MCNC: 12.4 G/DL (ref 14–18)
LYMPHOCYTES # BLD AUTO: 2.9 K/UL (ref 1–4.8)
LYMPHOCYTES NFR BLD: 24.2 % (ref 18–48)
MCH RBC QN AUTO: 30 PG (ref 27–31)
MCHC RBC AUTO-ENTMCNC: 32.3 G/DL (ref 32–36)
MCV RBC AUTO: 93 FL (ref 82–98)
MONOCYTES # BLD AUTO: 1.6 K/UL (ref 0.3–1)
MONOCYTES NFR BLD: 13.6 % (ref 4–15)
NEUTROPHILS # BLD AUTO: 7.2 K/UL (ref 1.8–7.7)
NEUTROPHILS NFR BLD: 60.3 % (ref 38–73)
PLATELET # BLD AUTO: 327 K/UL (ref 150–350)
PMV BLD AUTO: 11.2 FL (ref 9.2–12.9)
POCT GLUCOSE: 165 MG/DL (ref 70–110)
POCT GLUCOSE: 172 MG/DL (ref 70–110)
POCT GLUCOSE: 176 MG/DL (ref 70–110)
POCT GLUCOSE: 187 MG/DL (ref 70–110)
POTASSIUM SERPL-SCNC: 4.1 MMOL/L (ref 3.5–5.1)
RBC # BLD AUTO: 4.14 M/UL (ref 4.6–6.2)
SODIUM SERPL-SCNC: 141 MMOL/L (ref 136–145)
WBC # BLD AUTO: 11.98 K/UL (ref 3.9–12.7)

## 2019-08-09 PROCEDURE — 25000003 PHARM REV CODE 250: Performed by: SURGERY

## 2019-08-09 PROCEDURE — 25000003 PHARM REV CODE 250: Performed by: EMERGENCY MEDICINE

## 2019-08-09 PROCEDURE — 85025 COMPLETE CBC W/AUTO DIFF WBC: CPT

## 2019-08-09 PROCEDURE — 80048 BASIC METABOLIC PNL TOTAL CA: CPT

## 2019-08-09 PROCEDURE — 11000001 HC ACUTE MED/SURG PRIVATE ROOM

## 2019-08-09 PROCEDURE — 25000003 PHARM REV CODE 250: Performed by: HOSPITALIST

## 2019-08-09 PROCEDURE — 97116 GAIT TRAINING THERAPY: CPT

## 2019-08-09 PROCEDURE — 97110 THERAPEUTIC EXERCISES: CPT

## 2019-08-09 PROCEDURE — 36415 COLL VENOUS BLD VENIPUNCTURE: CPT

## 2019-08-09 PROCEDURE — 94761 N-INVAS EAR/PLS OXIMETRY MLT: CPT

## 2019-08-09 PROCEDURE — 97530 THERAPEUTIC ACTIVITIES: CPT

## 2019-08-09 RX ORDER — POLYETHYLENE GLYCOL 3350 17 G/17G
17 POWDER, FOR SOLUTION ORAL DAILY
Status: DISCONTINUED | OUTPATIENT
Start: 2019-08-09 | End: 2019-08-14 | Stop reason: HOSPADM

## 2019-08-09 RX ORDER — DOCUSATE SODIUM 100 MG/1
100 CAPSULE, LIQUID FILLED ORAL 2 TIMES DAILY
Status: DISCONTINUED | OUTPATIENT
Start: 2019-08-09 | End: 2019-08-09

## 2019-08-09 RX ORDER — DOCUSATE SODIUM 100 MG/1
100 CAPSULE, LIQUID FILLED ORAL 2 TIMES DAILY
Status: DISCONTINUED | OUTPATIENT
Start: 2019-08-09 | End: 2019-08-14 | Stop reason: HOSPADM

## 2019-08-09 RX ADMIN — OXYCODONE HYDROCHLORIDE 10 MG: 5 TABLET ORAL at 02:08

## 2019-08-09 RX ADMIN — OXYCODONE HYDROCHLORIDE 10 MG: 5 TABLET ORAL at 09:08

## 2019-08-09 RX ADMIN — POLYETHYLENE GLYCOL 3350 17 G: 17 POWDER, FOR SOLUTION ORAL at 09:08

## 2019-08-09 RX ADMIN — GABAPENTIN 300 MG: 300 CAPSULE ORAL at 02:08

## 2019-08-09 RX ADMIN — CLONIDINE HYDROCHLORIDE 0.1 MG: 0.1 TABLET ORAL at 05:08

## 2019-08-09 RX ADMIN — TAMSULOSIN HYDROCHLORIDE 0.4 MG: 0.4 CAPSULE ORAL at 08:08

## 2019-08-09 RX ADMIN — OXYCODONE HYDROCHLORIDE 10 MG: 5 TABLET ORAL at 08:08

## 2019-08-09 RX ADMIN — GABAPENTIN 300 MG: 300 CAPSULE ORAL at 08:08

## 2019-08-09 RX ADMIN — PANTOPRAZOLE SODIUM 40 MG: 40 TABLET, DELAYED RELEASE ORAL at 08:08

## 2019-08-09 RX ADMIN — DOCUSATE SODIUM 100 MG: 100 CAPSULE, LIQUID FILLED ORAL at 08:08

## 2019-08-09 RX ADMIN — PRAVASTATIN SODIUM 40 MG: 40 TABLET ORAL at 08:08

## 2019-08-09 RX ADMIN — OXYCODONE HYDROCHLORIDE 10 MG: 5 TABLET ORAL at 12:08

## 2019-08-09 RX ADMIN — CLOPIDOGREL BISULFATE 75 MG: 75 TABLET ORAL at 08:08

## 2019-08-09 RX ADMIN — DOCUSATE SODIUM 100 MG: 100 CAPSULE, LIQUID FILLED ORAL at 09:08

## 2019-08-09 RX ADMIN — ASPIRIN 81 MG 81 MG: 81 TABLET ORAL at 08:08

## 2019-08-09 RX ADMIN — AMLODIPINE BESYLATE 10 MG: 5 TABLET ORAL at 08:08

## 2019-08-09 NOTE — PT/OT/SLP PROGRESS
"Physical Therapy Treatment    Patient Name:  Jerry Galeana   MRN:  [id number]    Recommendations:     Discharge Recommendations:  rehabilitation facility   Discharge Equipment Recommendations: (ongoing assessment pending pt progress)   Barriers to discharge: Decreased caregiver support and decreased mobility and safety awareness    Assessment:     Jerry Galeana is a 59 y.o. male admitted with a medical diagnosis of Critical lower limb ischemia.  He presents with the following impairments/functional limitations:  weakness, impaired endurance, impaired self care skills, pain, impaired skin, edema, decreased lower extremity function, decreased ROM, gait instability, decreased upper extremity function, impaired balance, decreased safety awareness, impaired functional mobilty, decreased coordination, impaired sensation.  Pt with improved tolerance with therapy session.  Pt ambulated ~28ft with RW, CGA with LLE NWB 2/2 pain.  Pt tolerated weight shifting with RW to increase weight to LLE.    Rehab Prognosis: Fair; patient would benefit from acute skilled PT services to address these deficits and reach maximum level of function.    Recent Surgery: * No surgery found *      Plan:     During this hospitalization, patient to be seen 6 x/week to address the identified rehab impairments via gait training, therapeutic activities, therapeutic exercises, wheelchair management/training and progress toward the following goals:    · Plan of Care Expires:  08/21/19    Subjective     Chief Complaint: increased pain  Patient/Family Comments/goals: Pt states " I will try to walk."  Pain/Comfort:  · Pain Rating 1: 9/10  · Location - Side 1: Left  · Location 1: foot  · Pain Addressed 1: Reposition, Nurse notified, Distraction, Cessation of Activity      Objective:     Communicated with pt's nurse, Greg, prior to session.  Patient found up in chair with peripheral IV upon PT entry to room.     General Precautions: Standard, fall, diabetic "   Orthopedic Precautions:N/A   Braces: N/A     Functional Mobility:  · Transfers:     · Sit to Stand:  contact guard assistance with rolling walker  · Gait: Pt ambulated ~28ft with RW, CGA with NWB to LLE 2/2 pain. Requires cues for upright posture.  Pt with 3 point gait pattern, decreased chacho, step length, velocity of limb, weight bearing, postural control and safety awareness  · Balance: good sitting and fair- standing      AM-PAC 6 CLICK MOBILITY  Turning over in bed (including adjusting bedclothes, sheets and blankets)?: 4  Sitting down on and standing up from a chair with arms (e.g., wheelchair, bedside commode, etc.): 3  Moving from lying on back to sitting on the side of the bed?: 4  Moving to and from a bed to a chair (including a wheelchair)?: 3  Need to walk in hospital room?: 3  Climbing 3-5 steps with a railing?: 2  Basic Mobility Total Score: 19       Therapeutic Activities and Exercises:Required extra time to perform exercises.   Pt performed seated therex to BLEs x20 reps AROM including: marching, LAQs, heel raises, toe raises, active hip abduction/adduction  Pt performed standing therex: Weight shifting with RW, CGA 2x10 and Marching with RLE 2x5 to increase WB to LLE.  Increased weight throughout BUEs due to increased L foot pain.    Patient left up in chair with B heels elevated with all lines intact, call button in reach, chair alarm on and pt's nurse, Niesha, notified..    GOALS:   Multidisciplinary Problems     Physical Therapy Goals        Problem: Physical Therapy Goal    Goal Priority Disciplines Outcome Goal Variances Interventions   Physical Therapy Goal     PT, PT/OT Ongoing (interventions implemented as appropriate)     Description:  Goals to be met by: 2019     Patient will increase functional independence with mobility by performin. Supine to sit with Modified [place]  2. Sit to stand transfer with Modified [place]  3. Bed to chair transfer with Modified  [place]   4. Gait  x 25-50' feet with Modified [place] using Rolling Walker.   5. Wheelchair propulsion x150 feet with Modified [place] using bilateral uppper extremities  6. Lower extremity exercise program x10 reps per handout, with independence                      Time Tracking:     PT Received On: 08/09/19  PT Start Time: 1431     PT Stop Time: 1455  PT Total Time (min): 24 min     Billable Minutes: Gait Training 10 and Therapeutic Exercise 14    Treatment Type: Treatment  PT/PTA: PTA     PTA Visit Number: 2     Jo-Ann Mosley, PTA  08/09/2019

## 2019-08-09 NOTE — PROGRESS NOTES
[facility] Hospital Medicine  Progress Note    Patient Name: Jerry Galeana  MRN: [id number]  Patient Class: IP- Inpatient   Admission Date: 8/5/2019  Length of Stay: 4 days  Attending Physician: Chata Champagne MD  Primary Care Provider: Babak Bryan MD        Subjective:     Principal Problem:Critical lower limb ischemia      HPI:  This is a 59 y.o male patient, with a PMHx of diabetes mellitus, hypertension, and hyperlipidemia, presenting to the ED with a complaint of 10/10, sharp, chronic, left ankle and foot pain, numbness, and swelling, that began a few months ago, and worsened x4 days ago. He reports the pain at rest began yesterday. Patient states the pain is worse with movement. Patient reports he's having a surgery in x2 days on his left leg due to his atherosclerosis by , . Patient denies any fever, chills, shortness of breath, chest pain, neck pain, back pain, abdominal pain, rash, headaches, congestion, rhinorrhea, cough, sore throat, ear pain, eye pain, blurred vision, nausea, vomiting, diarrhea, dysuria, or any other associated symptoms. No prior Tx.. He reports he stopped smoking cigarettes x1 week ago. evaluated patient in ER recommending heparin drip,US arterial and X ray and plan for revascularization AM.    Overview/Hospital Course:  No notes on file    Interval History: Pt report pain to left leg still but now the leg is overall warmer.  Pain not improved today and his heart to put any weight on extremity.    Review of Systems   Constitutional: Negative for chills and fever.   Respiratory: Negative for shortness of breath.    Cardiovascular: Negative for chest pain.     Objective:     Vital Signs (Most Recent):  Temp: 98.2 °F (36.8 °C) (08/08/19 2349)  Pulse: 92 (08/08/19 2349)  Resp: 19 (08/08/19 2349)  BP: (!) 180/81 (08/08/19 2349)  SpO2: 99 % (08/08/19 2349) Vital Signs (24h Range):  Temp:  [98.1 °F (36.7 °C)-98.4 °F (36.9 °C)] 98.2 °F (36.8 °C)  Pulse:   [80-96] 92  Resp:  [16-19] 19  SpO2:  [96 %-99 %] 99 %  BP: (137-181)/(63-81) 180/81     Weight: 49.4 kg (108 lb 14.5 oz)  Body mass index is 18.12 kg/m².    Intake/Output Summary (Last 24 hours) at 8/9/2019 0051  Last data filed at 8/9/2019 0000  Gross per 24 hour   Intake 960 ml   Output 2000 ml   Net -1040 ml      Physical Exam   Constitutional: He is oriented to person, place, and time. No distress.   HENT:   Head: Atraumatic.   Eyes: Pupils are equal, round, and reactive to light. EOM are normal.   Neck: Normal range of motion. Neck supple.   Cardiovascular: Normal rate and regular rhythm.   Pulmonary/Chest: Effort normal and breath sounds normal.   Abdominal: Soft.   Musculoskeletal: Normal range of motion. He exhibits tenderness. He exhibits no deformity.   Left leg slightly cooler to touch compared with right, no longer cyanotic in appearance and warmer to touch compared to yesterday, but still with significant pain   Neurological: He is oriented to person, place, and time. No cranial nerve deficit. Coordination normal.   Skin: Skin is warm.   Psychiatric: He has a normal mood and affect. His behavior is normal.       Significant Labs: All pertinent labs within the past 24 hours have been reviewed.    Significant Imaging: I have reviewed and interpreted all pertinent imaging results/findings within the past 24 hours.      Assessment/Plan:      * Critical lower limb ischemia  Patient with severe PVD that is symtomatic  Treated with heparin gtt, now stopped  S/p left common iliac stent, and external iliac artery stent placed by vascular surgery on 08/06/2019 with improved perfusion to the extremity  Patient started on aspirin and Plavix  However he has significant pain to the left leg  Continue PT and OT  Patient now agreeable to rehab placement if needed given his difficulty and pain    Tobacco abuse  Smoking cessation counseling done for > 3 minutes    Hyperlipidemia  Continue statin.    BPH (benign  prostatic hyperplasia)  Resume Flomax    DM (diabetes mellitus) type II, controlled, with peripheral vascular disorder  Continue on SSI.    Essential hypertension  Resume home medication Norvasc and added prn clonidine.        VTE Risk Mitigation (From admission, onward)        Ordered     IP VTE HIGH RISK PATIENT  Once      08/05/19 3498                Chata Champagne MD  Department of Hospital Medicine   [facility]

## 2019-08-09 NOTE — PROGRESS NOTES
1115 Pt has been denied by insurance for Inpt Rehab per Pedro Luis of [facility]; conveyed to attending, Dr. Champagne.    8582 TN contacted Livia Banuelos of [facility] at [phone] to inform Dr. Champagne is willing to do the peer to peer review. TN left physician's name, contact number, and preferable time after 3:30 PM. TN provided TN's contact name and number as well on the voice mail.

## 2019-08-09 NOTE — PLAN OF CARE
Problem: Physical Therapy Goal  Goal: Physical Therapy Goal  Goals to be met by: 2019     Patient will increase functional independence with mobility by performin. Supine to sit with Modified [place]  2. Sit to stand transfer with Modified [place]  3. Bed to chair transfer with Modified [place]   4. Gait  x 25-50' feet with Modified [place] using Rolling Walker.   5. Wheelchair propulsion x150 feet with Modified [place] using bilateral uppper extremities  6. Lower extremity exercise program x10 reps per handout, with independence     Outcome: Ongoing (interventions implemented as appropriate)   Pt with improved tolerance with therapy session.  Pt ambulated ~28ft with RW, CGA with LLE NWB 2/2 pain.  Pt tolerated weight shifting with RW to increase weight to LLE.

## 2019-08-09 NOTE — ASSESSMENT & PLAN NOTE
Patient with severe PVD that is symtomatic  Treated with heparin gtt, now stopped  S/p left common iliac stent, and external iliac artery stent placed by vascular surgery on 08/06/2019 with improved perfusion to the extremity  Patient started on aspirin and Plavix  However he has significant pain to the left leg  Continue PT and OT  Patient now agreeable to rehab placement if needed given his difficulty and pain

## 2019-08-09 NOTE — PLAN OF CARE
Problem: Occupational Therapy Goal  Goal: Occupational Therapy Goal  Goals to be met by: 08/21/19     Patient will increase functional independence with ADLs by performing:    UE Dressing with Modified [place].  LE Dressing with Supervision.  Grooming while seated with Set-up Assistance.  Toileting from bedside commode with Stand-by Assistance for hygiene and clothing management.   Sitting at edge of bed x10 minutes with Supervision.  Step transfer with Stand-by Assistance  Toilet transfer to bedside commode with Stand-by Assistance.  Upper extremity exercise program x15 reps per handout, with independence. (Goal Met 8/9/2019)     Outcome: Ongoing (interventions implemented as appropriate)  The pt has met goal #8, and is progressing towards meeting all other goals.  The pt would continue to benefit from skilled occupational therapy in order to maximize I with ADLs, and functional transfers.

## 2019-08-09 NOTE — PT/OT/SLP PROGRESS
Occupational Therapy   Treatment    Name: Jerry Galeana  MRN: [id number]  Admitting Diagnosis:  Critical lower limb ischemia       Recommendations:     Discharge Recommendations: rehabilitation facility  Discharge Equipment Recommendations:  (ongoing assessment pending pt progress)  Barriers to discharge:  Decreased caregiver support    Assessment:     Jerry Galeana is a 59 y.o. male with a medical diagnosis of Critical lower limb ischemia.  He presents with decreased I with ADLs, and functional transfer. Performance deficits affecting function are weakness, impaired self care skills, gait instability, impaired functional mobilty, impaired balance, pain, decreased lower extremity function, decreased safety awareness, edema, decreased ROM.     Rehab Prognosis:  Good; patient would benefit from acute skilled OT services to address these deficits and reach maximum level of function.       Plan:     Patient to be seen 5 x/week to address the above listed problems via self-care/home management, therapeutic activities, therapeutic exercises  · Plan of Care Expires: 08/21/19  · Plan of Care Reviewed with: patient    Subjective     Pain/Comfort:  Pain Rating 1: 10/10  Location - Side 1: Left  Location - Orientation 1: generalized  Location 1: leg  Pain Addressed 1: Pre-medicate for activity, Distraction, Reposition  Pain Rating Post-Intervention 2: 10/10    Objective:     Communicated with: Nursing prior to session.  Patient found HOB elevated with peripheral IV upon OT entry to room.    General Precautions: Standard, fall   Orthopedic Precautions:N/A   Braces: N/A     Occupational Performance:     Bed Mobility:    · Patient completed Rolling/Turning to Right with supervision  · Patient completed Scooting/Bridging with supervision  · Patient completed Supine to Sit with supervision     Functional Mobility/Transfers:  · Patient completed Sit <> Stand Transfer with minimum assistance  with  hand-held assist   · Patient completed  Bed <> Chair Transfer using Stand Pivot technique with minimum assistance with hand-held assist  · Functional Mobility: Min A with hand held assist    Activities of Daily Living:  · Not testes      AMPAC 6 Click ADL: 21    Treatment & Education:  The pt performed the above listed activities.    The pt performed 2 sets of 10 BUE green theraband exercises in all planes per the handout.    Patient left up in chair with call button in reach and PTA presentEducation:      GOALS:   Multidisciplinary Problems     Occupational Therapy Goals        Problem: Occupational Therapy Goal    Goal Priority Disciplines Outcome Interventions   Occupational Therapy Goal     OT, PT/OT Ongoing (interventions implemented as appropriate)    Description:  Goals to be met by: 08/21/19     Patient will increase functional independence with ADLs by performing:    UE Dressing with Modified [place].  LE Dressing with Supervision.  Grooming while seated with Set-up Assistance.  Toileting from bedside commode with Stand-by Assistance for hygiene and clothing management.   Sitting at edge of bed x10 minutes with Supervision.  Step transfer with Stand-by Assistance  Toilet transfer to bedside commode with Stand-by Assistance.  Upper extremity exercise program x15 reps per handout, with independence. (Goal Met 8/9/2019)                       Time Tracking:     OT Date of Treatment: 08/09/19  OT Start Time: 1110  OT Stop Time: 1135  OT Total Time (min): 25 min    Billable Minutes:Therapeutic Activity 12 minutes  Therapeutic Exercise 13 minutes    Susanna Becerra OT  8/9/2019

## 2019-08-09 NOTE — SUBJECTIVE & OBJECTIVE
Interval History: Pt report pain to left leg still but now the leg is overall warmer.  Pain not improved today and his heart to put any weight on extremity.    Review of Systems   Constitutional: Negative for chills and fever.   Respiratory: Negative for shortness of breath.    Cardiovascular: Negative for chest pain.     Objective:     Vital Signs (Most Recent):  Temp: 98.2 °F (36.8 °C) (08/08/19 2349)  Pulse: 92 (08/08/19 2349)  Resp: 19 (08/08/19 2349)  BP: (!) 180/81 (08/08/19 2349)  SpO2: 99 % (08/08/19 2349) Vital Signs (24h Range):  Temp:  [98.1 °F (36.7 °C)-98.4 °F (36.9 °C)] 98.2 °F (36.8 °C)  Pulse:  [80-96] 92  Resp:  [16-19] 19  SpO2:  [96 %-99 %] 99 %  BP: (137-181)/(63-81) 180/81     Weight: 49.4 kg (108 lb 14.5 oz)  Body mass index is 18.12 kg/m².    Intake/Output Summary (Last 24 hours) at 8/9/2019 0051  Last data filed at 8/9/2019 0000  Gross per 24 hour   Intake 960 ml   Output 2000 ml   Net -1040 ml      Physical Exam   Constitutional: He is oriented to person, place, and time. No distress.   HENT:   Head: Atraumatic.   Eyes: Pupils are equal, round, and reactive to light. EOM are normal.   Neck: Normal range of motion. Neck supple.   Cardiovascular: Normal rate and regular rhythm.   Pulmonary/Chest: Effort normal and breath sounds normal.   Abdominal: Soft.   Musculoskeletal: Normal range of motion. He exhibits tenderness. He exhibits no deformity.   Left leg slightly cooler to touch compared with right, no longer cyanotic in appearance and warmer to touch compared to yesterday, but still with significant pain   Neurological: He is oriented to person, place, and time. No cranial nerve deficit. Coordination normal.   Skin: Skin is warm.   Psychiatric: He has a normal mood and affect. His behavior is normal.       Significant Labs: All pertinent labs within the past 24 hours have been reviewed.    Significant Imaging: I have reviewed and interpreted all pertinent imaging results/findings within the  past 24 hours.

## 2019-08-10 LAB
ANION GAP SERPL CALC-SCNC: 7 MMOL/L (ref 8–16)
BASOPHILS # BLD AUTO: 0.03 K/UL (ref 0–0.2)
BASOPHILS NFR BLD: 0.3 % (ref 0–1.9)
BUN SERPL-MCNC: 7 MG/DL (ref 8–23)
CALCIUM SERPL-MCNC: 9 MG/DL (ref 8.7–10.5)
CHLORIDE SERPL-SCNC: 106 MMOL/L (ref 95–110)
CO2 SERPL-SCNC: 26 MMOL/L (ref 23–29)
CREAT SERPL-MCNC: 0.6 MG/DL (ref 0.5–1.4)
DIFFERENTIAL METHOD: ABNORMAL
EOSINOPHIL # BLD AUTO: 0.2 K/UL (ref 0–0.5)
EOSINOPHIL NFR BLD: 1.9 % (ref 0–8)
ERYTHROCYTE [DISTWIDTH] IN BLOOD BY AUTOMATED COUNT: 13.7 % (ref 11.5–14.5)
EST. GFR  (AFRICAN AMERICAN): >60 ML/MIN/1.73 M^2
EST. GFR  (NON AFRICAN AMERICAN): >60 ML/MIN/1.73 M^2
ESTIMATED AVG GLUCOSE: 128 MG/DL (ref 68–131)
GLUCOSE SERPL-MCNC: 149 MG/DL (ref 70–110)
HBA1C MFR BLD HPLC: 6.1 % (ref 4–5.6)
HCT VFR BLD AUTO: 38.5 % (ref 40–54)
HGB BLD-MCNC: 12.4 G/DL (ref 14–18)
LYMPHOCYTES # BLD AUTO: 2.4 K/UL (ref 1–4.8)
LYMPHOCYTES NFR BLD: 21.1 % (ref 18–48)
MCH RBC QN AUTO: 29.5 PG (ref 27–31)
MCHC RBC AUTO-ENTMCNC: 32.2 G/DL (ref 32–36)
MCV RBC AUTO: 91 FL (ref 82–98)
MONOCYTES # BLD AUTO: 1.8 K/UL (ref 0.3–1)
MONOCYTES NFR BLD: 15.5 % (ref 4–15)
NEUTROPHILS # BLD AUTO: 7 K/UL (ref 1.8–7.7)
NEUTROPHILS NFR BLD: 61.5 % (ref 38–73)
PLATELET # BLD AUTO: 342 K/UL (ref 150–350)
PMV BLD AUTO: 11.4 FL (ref 9.2–12.9)
POCT GLUCOSE: 159 MG/DL (ref 70–110)
POCT GLUCOSE: 174 MG/DL (ref 70–110)
POCT GLUCOSE: 176 MG/DL (ref 70–110)
POCT GLUCOSE: 183 MG/DL (ref 70–110)
POTASSIUM SERPL-SCNC: 3.9 MMOL/L (ref 3.5–5.1)
RBC # BLD AUTO: 4.21 M/UL (ref 4.6–6.2)
SODIUM SERPL-SCNC: 139 MMOL/L (ref 136–145)
WBC # BLD AUTO: 11.43 K/UL (ref 3.9–12.7)

## 2019-08-10 PROCEDURE — 25000003 PHARM REV CODE 250: Performed by: SURGERY

## 2019-08-10 PROCEDURE — 97116 GAIT TRAINING THERAPY: CPT

## 2019-08-10 PROCEDURE — 85025 COMPLETE CBC W/AUTO DIFF WBC: CPT

## 2019-08-10 PROCEDURE — 63600175 PHARM REV CODE 636 W HCPCS: Performed by: EMERGENCY MEDICINE

## 2019-08-10 PROCEDURE — 97110 THERAPEUTIC EXERCISES: CPT

## 2019-08-10 PROCEDURE — 94761 N-INVAS EAR/PLS OXIMETRY MLT: CPT

## 2019-08-10 PROCEDURE — 25000003 PHARM REV CODE 250: Performed by: HOSPITALIST

## 2019-08-10 PROCEDURE — 11000001 HC ACUTE MED/SURG PRIVATE ROOM

## 2019-08-10 PROCEDURE — 83036 HEMOGLOBIN GLYCOSYLATED A1C: CPT

## 2019-08-10 PROCEDURE — 80048 BASIC METABOLIC PNL TOTAL CA: CPT

## 2019-08-10 PROCEDURE — 25000003 PHARM REV CODE 250: Performed by: EMERGENCY MEDICINE

## 2019-08-10 PROCEDURE — 36415 COLL VENOUS BLD VENIPUNCTURE: CPT

## 2019-08-10 RX ADMIN — OXYCODONE HYDROCHLORIDE 10 MG: 5 TABLET ORAL at 07:08

## 2019-08-10 RX ADMIN — GABAPENTIN 300 MG: 300 CAPSULE ORAL at 02:08

## 2019-08-10 RX ADMIN — TAMSULOSIN HYDROCHLORIDE 0.4 MG: 0.4 CAPSULE ORAL at 09:08

## 2019-08-10 RX ADMIN — OXYCODONE HYDROCHLORIDE 10 MG: 5 TABLET ORAL at 02:08

## 2019-08-10 RX ADMIN — OXYCODONE HYDROCHLORIDE 10 MG: 5 TABLET ORAL at 04:08

## 2019-08-10 RX ADMIN — CLOPIDOGREL BISULFATE 75 MG: 75 TABLET ORAL at 09:08

## 2019-08-10 RX ADMIN — DOCUSATE SODIUM 100 MG: 100 CAPSULE, LIQUID FILLED ORAL at 09:08

## 2019-08-10 RX ADMIN — AMLODIPINE BESYLATE 10 MG: 5 TABLET ORAL at 09:08

## 2019-08-10 RX ADMIN — SODIUM CHLORIDE: 0.9 INJECTION, SOLUTION INTRAVENOUS at 12:08

## 2019-08-10 RX ADMIN — PANTOPRAZOLE SODIUM 40 MG: 40 TABLET, DELAYED RELEASE ORAL at 09:08

## 2019-08-10 RX ADMIN — ASPIRIN 81 MG 81 MG: 81 TABLET ORAL at 09:08

## 2019-08-10 RX ADMIN — OXYCODONE HYDROCHLORIDE 10 MG: 5 TABLET ORAL at 12:08

## 2019-08-10 RX ADMIN — OXYCODONE HYDROCHLORIDE 10 MG: 5 TABLET ORAL at 09:08

## 2019-08-10 RX ADMIN — GABAPENTIN 300 MG: 300 CAPSULE ORAL at 09:08

## 2019-08-10 RX ADMIN — PRAVASTATIN SODIUM 40 MG: 40 TABLET ORAL at 09:08

## 2019-08-10 RX ADMIN — SODIUM CHLORIDE: 0.9 INJECTION, SOLUTION INTRAVENOUS at 02:08

## 2019-08-10 RX ADMIN — POLYETHYLENE GLYCOL 3350 17 G: 17 POWDER, FOR SOLUTION ORAL at 08:08

## 2019-08-10 NOTE — SUBJECTIVE & OBJECTIVE
Interval History: Pt report pain to left leg slightly better today and he reports he is able to do a little bit more with therapy.    Review of Systems   Constitutional: Negative for chills and fever.   Respiratory: Negative for shortness of breath.    Cardiovascular: Negative for chest pain.     Objective:     Vital Signs (Most Recent):  Temp: 98.9 °F (37.2 °C) (08/09/19 2004)  Pulse: 83 (08/09/19 2004)  Resp: 20 (08/09/19 2004)  BP: (!) 148/66 (08/09/19 2004)  SpO2: 97 % (08/09/19 2004) Vital Signs (24h Range):  Temp:  [98.2 °F (36.8 °C)-98.9 °F (37.2 °C)] 98.9 °F (37.2 °C)  Pulse:  [82-94] 83  Resp:  [18-20] 20  SpO2:  [96 %-99 %] 97 %  BP: (145-180)/(65-81) 148/66     Weight: 49.4 kg (108 lb 14.5 oz)  Body mass index is 18.12 kg/m².    Intake/Output Summary (Last 24 hours) at 8/9/2019 2245  Last data filed at 8/9/2019 1700  Gross per 24 hour   Intake 360 ml   Output 2075 ml   Net -1715 ml      Physical Exam   Constitutional: He is oriented to person, place, and time. He appears well-developed. No distress.   Chronically ill-appearing, thin   HENT:   Head: Atraumatic.   Eyes: Pupils are equal, round, and reactive to light. EOM are normal.   Neck: Normal range of motion. Neck supple.   Cardiovascular: Normal rate and regular rhythm.   Pulmonary/Chest: Effort normal and breath sounds normal.   Abdominal: Soft.   Musculoskeletal: Normal range of motion. He exhibits tenderness. He exhibits no deformity.   Left leg slightly cooler to touch compared with right, but no longer cyanotic in appearance and warmer to touch, but still with significant pain with palpation   Neurological: He is oriented to person, place, and time. No cranial nerve deficit. Coordination normal.   Skin: Skin is warm.   Psychiatric: He has a normal mood and affect. His behavior is normal.       Significant Labs: All pertinent labs within the past 24 hours have been reviewed.    Significant Imaging: I have reviewed and interpreted all pertinent  imaging results/findings within the past 24 hours.

## 2019-08-10 NOTE — PLAN OF CARE
Problem: Adult Inpatient Plan of Care  Goal: Plan of Care Review  Outcome: Ongoing (interventions implemented as appropriate)     08/10/19 8904   Plan of Care Review   Plan of Care Reviewed With patient   Progress improving     Patient oriented x 4 and cooperative; unsteady gait.  Due to low weight and HOB elevated unable to keep bed alarm set.  Door open, telesitter in place, frequent checks, and pt teaching on calling for assist before getting OOB.  Pain managed w/ prn medication.  IVF continued.  BCG monitored.  Tolerating ADA diet.  Voiding frequency w/ hesitancy.  Will continue to monitor.

## 2019-08-10 NOTE — PT/OT/SLP PROGRESS
"Physical Therapy Treatment    Patient Name:  Jerry Galeana   MRN:  [id number]    Recommendations:     Discharge Recommendations:  rehabilitation facility   Discharge Equipment Recommendations: (ongoing assessment pending pt progress)   Barriers to discharge: Decreased caregiver support and decreased mobility/safety awareness    Assessment:     Jerry Galeana is a 59 y.o. male admitted with a medical diagnosis of Critical lower limb ischemia.  He presents with the following impairments/functional limitations:  weakness, impaired endurance, impaired self care skills, impaired functional mobilty, decreased safety awareness, pain, edema, decreased ROM, decreased lower extremity function, impaired balance, impaired skin, decreased upper extremity function, gait instability, decreased coordination, impaired sensation.  Pt continues to make gains with gait distance and WB to LLE.  Pt ambulated ~120ft with RW, CGA NWB to LLE 2/2 pain.  Ambulated ~42ft with RW, CGA WBAT to LLE.  Pt able to perform hygiene care at sink with CGA-SBA and WBAT to LLE.     Rehab Prognosis: Good; patient would benefit from acute skilled PT services to address these deficits and reach maximum level of function.    Recent Surgery: * No surgery found *      Plan:     During this hospitalization, patient to be seen 6 x/week to address the identified rehab impairments via gait training, therapeutic activities, therapeutic exercises, wheelchair management/training and progress toward the following goals:    · Plan of Care Expires:  08/21/19    Subjective     Chief Complaint: LLE pain  Patient/Family Comments/goals: Pt reports he is making progress. " God Bless you.  Thank you so much."  Pain/Comfort:  · Pain Rating 1: 6/10  · Location - Side 1: Left  · Location 1: leg  · Pain Addressed 1: Pre-medicate for activity, Reposition, Distraction, Cessation of Activity  · Pain Rating Post-Intervention 2: 10/10      Objective:     Communicated with pt's nurse, Arley, " prior to session.  Patient found HOB elevated with peripheral IV upon PT entry to room.     General Precautions: Standard, fall, diabetic   Orthopedic Precautions:N/A   Braces: N/A     Functional Mobility:  · Bed Mobility:     · Scooting: modified independence  · Supine to Sit: modified independence  · Transfers:from EOB     · Sit to Stand:  contact guard assistance with rolling walker  · Gait: Pt ambulated ~120ft with RW, CGA NWB to LLE 2/2 pain.  Ambulated ~42ft with RW, CGA WBAT to LLE.  Pt with cues for upright posture and for BUE weight distribution.  Decreased chacho, step length, foot clearance, weight shifting, safety, balance and endurance.  · Balance: good sitting and fair standing      AM-PAC 6 CLICK MOBILITY  Turning over in bed (including adjusting bedclothes, sheets and blankets)?: 4  Sitting down on and standing up from a chair with arms (e.g., wheelchair, bedside commode, etc.): 3  Moving from lying on back to sitting on the side of the bed?: 4  Moving to and from a bed to a chair (including a wheelchair)?: 3  Need to walk in hospital room?: 3  Climbing 3-5 steps with a railing?: 2  Basic Mobility Total Score: 19       Therapeutic Activities and Exercises:   Pt performed BM, transfers, standing voiding at toilet, hygiene care at sink, therex, and gait.  Pt tolerated voiding with RW, CGA with WBAT to LLE.  Pt washed hands, brushed teeth/mouth wash, washed face at sink with RW using unilateral UE support, WBAT to LLE, and CGA-SBA.  Performed seated therex to BLEs x20 reps AROM including: marching, LAQs, SLRs, heel raises, toe raises, active hip abduction/adduction, ankle pumps(reclined in B[place])    Patient left reclined in B[place] with BLE elevated with all lines intact, call button in reach, chair alarm on, pt's nurse, Arley, notified and PCT and AVASYS present..    GOALS:   Multidisciplinary Problems     Physical Therapy Goals        Problem: Physical Therapy Goal    Goal Priority Disciplines  Outcome Goal Variances Interventions   Physical Therapy Goal     PT, PT/OT Ongoing (interventions implemented as appropriate)     Description:  Goals to be met by: 2019     Patient will increase functional independence with mobility by performin. Supine to sit with Modified [place]  2. Sit to stand transfer with Modified [place]  3. Bed to chair transfer with Modified [place]   4. Gait  x 25-50' feet with Modified [place] using Rolling Walker.   5. Wheelchair propulsion x150 feet with Modified [place] using bilateral uppper extremities  6. Lower extremity exercise program x10 reps per handout, with independence                      Time Tracking:     PT Received On: 08/10/19  PT Start Time: 1115     PT Stop Time: 1148  PT Total Time (min): 33 min     Billable Minutes: Gait Training 20 and Therapeutic Exercise 13    Treatment Type: Treatment  PT/PTA: PTA     PTA Visit Number: 3     Jo-Anntabitha Mosley, PTA  08/10/2019

## 2019-08-10 NOTE — PLAN OF CARE
Problem: Adult Inpatient Plan of Care  Goal: Plan of Care Review  Outcome: Ongoing (interventions implemented as appropriate)  Pt did well overnight. Neuro and neurovascular checks WDL. LLE dusky and warm with numbness/tingling reported. Difficulty palpating dorsal pedal pulse. Frequent reports of pain to LLE, treated with PRN oxy IR. , no SSI required. Continuous IVF infusing as ordered. UO WDL. VSSAF. Pt continues to attempt to get OOB without assist. Frequent reinforcement of fall precautions needed. No acute events overnight.

## 2019-08-10 NOTE — HOSPITAL COURSE
Mr. Galeana presented with left leg pain and swelling secondary to critical limb ischemia.  He was treated with heparin infusion and consult placed to vascular surgery.  Heparin infusion stopped prior to procedure.  Patient underwent stent placement to the left common iliac and left external iliac artery on 08/06/2019.  He was then started on aspirin and Plavix after stenting was complete, and patient had PT and OT to evaluate and treat with recommendation for rehab placement.  Patient is not a candidate for in-patient rehabilitation unit.medicaid rejected inpatient rehab placement,  Patient continued with PT,OT with some improvement,  Still has severe LLL pain.PT,OT still is recommended inpatient rehab,but insurance is denying.his pain and condition is improved,patient has jose david discharged home with recommendation taking help from family,out patient PT,OT,DME,ASA,plavix is prescribed,patient also will follow up with PCP and vascular as out patient.

## 2019-08-10 NOTE — PLAN OF CARE
Problem: Physical Therapy Goal  Goal: Physical Therapy Goal  Goals to be met by: 2019     Patient will increase functional independence with mobility by performin. Supine to sit with Modified [place]- met 8/10  2. Sit to stand transfer with Modified [place]  3. Bed to chair transfer with Modified [place]   4. Gait  x 25-50' feet with Modified [place] using Rolling Walker.   5. Wheelchair propulsion x150 feet with Modified [place] using bilateral uppper extremities  6. Lower extremity exercise program x10 reps per handout, with independence     Outcome: Ongoing (interventions implemented as appropriate)   Pt continues to make gains with gait distance and WB to LLE.  Pt ambulated ~120ft with RW, CGA NWB to LLE 2/2 pain.  Ambulated ~42ft with RW, CGA WBAT to LLE.  Pt able to perform hygiene care at sink with CGA-SBA and WBAT to LLE.

## 2019-08-10 NOTE — PROGRESS NOTES
[facility] Hospital Medicine  Progress Note    Patient Name: Jerry Galeana  MRN: [id number]  Patient Class: IP- Inpatient   Admission Date: 8/5/2019  Length of Stay: 4 days  Attending Physician: Chata Champagne MD  Primary Care Provider: Babak Bryan MD        Subjective:     Principal Problem:Critical lower limb ischemia      HPI:  This is a 59 y.o male patient, with a PMHx of diabetes mellitus, hypertension, and hyperlipidemia, presenting to the ED with a complaint of 10/10, sharp, chronic, left ankle and foot pain, numbness, and swelling, that began a few months ago, and worsened x4 days ago. He reports the pain at rest began yesterday. Patient states the pain is worse with movement. Patient reports he's having a surgery in x2 days on his left leg due to his atherosclerosis by , . Patient denies any fever, chills, shortness of breath, chest pain, neck pain, back pain, abdominal pain, rash, headaches, congestion, rhinorrhea, cough, sore throat, ear pain, eye pain, blurred vision, nausea, vomiting, diarrhea, dysuria, or any other associated symptoms. No prior Tx.. He reports he stopped smoking cigarettes x1 week ago. evaluated patient in ER recommending heparin drip,US arterial and X ray and plan for revascularization AM.    Overview/Hospital Course:  Mr. Galeana presented with left leg pain and swelling secondary to critical limb ischemia.  He was treated with heparin infusion and consult placed to vascular surgery.  Heparin infusion stopped prior to procedure.  Patient underwent stent placement to the left common iliac and left external iliac artery on 08/06/2019.  He was then started on aspirin and Plavix after stenting was complete, and patient had PT and OT to evaluate and treat with recommendation for rehab placement.    Interval History: Pt report pain to left leg slightly better today and he reports he is able to do a little bit more with therapy.    Review of Systems    Constitutional: Negative for chills and fever.   Respiratory: Negative for shortness of breath.    Cardiovascular: Negative for chest pain.     Objective:     Vital Signs (Most Recent):  Temp: 98.9 °F (37.2 °C) (08/09/19 2004)  Pulse: 83 (08/09/19 2004)  Resp: 20 (08/09/19 2004)  BP: (!) 148/66 (08/09/19 2004)  SpO2: 97 % (08/09/19 2004) Vital Signs (24h Range):  Temp:  [98.2 °F (36.8 °C)-98.9 °F (37.2 °C)] 98.9 °F (37.2 °C)  Pulse:  [82-94] 83  Resp:  [18-20] 20  SpO2:  [96 %-99 %] 97 %  BP: (145-180)/(65-81) 148/66     Weight: 49.4 kg (108 lb 14.5 oz)  Body mass index is 18.12 kg/m².    Intake/Output Summary (Last 24 hours) at 8/9/2019 2245  Last data filed at 8/9/2019 1700  Gross per 24 hour   Intake 360 ml   Output 2075 ml   Net -1715 ml      Physical Exam   Constitutional: He is oriented to person, place, and time. He appears well-developed. No distress.   Chronically ill-appearing, thin   HENT:   Head: Atraumatic.   Eyes: Pupils are equal, round, and reactive to light. EOM are normal.   Neck: Normal range of motion. Neck supple.   Cardiovascular: Normal rate and regular rhythm.   Pulmonary/Chest: Effort normal and breath sounds normal.   Abdominal: Soft.   Musculoskeletal: Normal range of motion. He exhibits tenderness. He exhibits no deformity.   Left leg slightly cooler to touch compared with right, but no longer cyanotic in appearance and warmer to touch, but still with significant pain with palpation   Neurological: He is oriented to person, place, and time. No cranial nerve deficit. Coordination normal.   Skin: Skin is warm.   Psychiatric: He has a normal mood and affect. His behavior is normal.       Significant Labs: All pertinent labs within the past 24 hours have been reviewed.    Significant Imaging: I have reviewed and interpreted all pertinent imaging results/findings within the past 24 hours.      Assessment/Plan:      * Critical lower limb ischemia  Patient with severe PVD that is  symtomatic  Status post treatment with heparin gtt, now stopped  S/p left common iliac and external iliac artery stent placed by vascular surgery on 08/06/2019 with improved perfusion to the extremity  Patient started on aspirin and Plavix postprocedure  He has significant pain to the left leg that persists that limits his ability to walk  Continue PT and OT with recommendation for rehab placement  Insurance denied rehab placement today  Plan for peer to peer review for later this afternoon    Tobacco abuse  Smoking cessation counseling done for > 3 minutes    Hyperlipidemia  Continue atorvastatin    BPH (benign prostatic hyperplasia)  Resume Flomax    DM (diabetes mellitus) type II, controlled, with peripheral vascular disorder  Continue on SSI.  Glucose goal during hospitalization between 140 and 180  Hemoglobin A1c pending    Essential hypertension  Resume home medication Norvasc and added prn clonidine.      VTE Risk Mitigation (From admission, onward)        Ordered     IP VTE HIGH RISK PATIENT  Once      08/05/19 1821                Chata Champagne MD  Department of Hospital Medicine   [facility]

## 2019-08-10 NOTE — ASSESSMENT & PLAN NOTE
Patient with severe PVD that is symtomatic  Status post treatment with heparin gtt, now stopped  S/p left common iliac and external iliac artery stent placed by vascular surgery on 08/06/2019 with improved perfusion to the extremity  Patient started on aspirin and Plavix postprocedure  He has significant pain to the left leg that persists that limits his ability to walk  Continue PT and OT with recommendation for rehab placement  Insurance denied rehab placement today  Plan for peer to peer review for later this afternoon

## 2019-08-11 PROBLEM — E78.2 MIXED HYPERLIPIDEMIA: Status: ACTIVE | Noted: 2019-08-05

## 2019-08-11 LAB
ANION GAP SERPL CALC-SCNC: 7 MMOL/L (ref 8–16)
BASOPHILS # BLD AUTO: 0.02 K/UL (ref 0–0.2)
BASOPHILS NFR BLD: 0.2 % (ref 0–1.9)
BUN SERPL-MCNC: 5 MG/DL (ref 8–23)
CALCIUM SERPL-MCNC: 9.1 MG/DL (ref 8.7–10.5)
CHLORIDE SERPL-SCNC: 104 MMOL/L (ref 95–110)
CO2 SERPL-SCNC: 27 MMOL/L (ref 23–29)
CREAT SERPL-MCNC: 0.7 MG/DL (ref 0.5–1.4)
DIFFERENTIAL METHOD: ABNORMAL
EOSINOPHIL # BLD AUTO: 0.3 K/UL (ref 0–0.5)
EOSINOPHIL NFR BLD: 2.5 % (ref 0–8)
ERYTHROCYTE [DISTWIDTH] IN BLOOD BY AUTOMATED COUNT: 13.6 % (ref 11.5–14.5)
EST. GFR  (AFRICAN AMERICAN): >60 ML/MIN/1.73 M^2
EST. GFR  (NON AFRICAN AMERICAN): >60 ML/MIN/1.73 M^2
GLUCOSE SERPL-MCNC: 173 MG/DL (ref 70–110)
HCT VFR BLD AUTO: 38 % (ref 40–54)
HGB BLD-MCNC: 12.4 G/DL (ref 14–18)
LYMPHOCYTES # BLD AUTO: 2.7 K/UL (ref 1–4.8)
LYMPHOCYTES NFR BLD: 26.7 % (ref 18–48)
MCH RBC QN AUTO: 29.7 PG (ref 27–31)
MCHC RBC AUTO-ENTMCNC: 32.6 G/DL (ref 32–36)
MCV RBC AUTO: 91 FL (ref 82–98)
MONOCYTES # BLD AUTO: 1.5 K/UL (ref 0.3–1)
MONOCYTES NFR BLD: 14.5 % (ref 4–15)
NEUTROPHILS # BLD AUTO: 5.7 K/UL (ref 1.8–7.7)
NEUTROPHILS NFR BLD: 56.3 % (ref 38–73)
PLATELET # BLD AUTO: 346 K/UL (ref 150–350)
PMV BLD AUTO: 11.1 FL (ref 9.2–12.9)
POCT GLUCOSE: 181 MG/DL (ref 70–110)
POCT GLUCOSE: 199 MG/DL (ref 70–110)
POCT GLUCOSE: 214 MG/DL (ref 70–110)
POTASSIUM SERPL-SCNC: 3.8 MMOL/L (ref 3.5–5.1)
RBC # BLD AUTO: 4.17 M/UL (ref 4.6–6.2)
SODIUM SERPL-SCNC: 138 MMOL/L (ref 136–145)
WBC # BLD AUTO: 10.16 K/UL (ref 3.9–12.7)

## 2019-08-11 PROCEDURE — 11000001 HC ACUTE MED/SURG PRIVATE ROOM

## 2019-08-11 PROCEDURE — 94761 N-INVAS EAR/PLS OXIMETRY MLT: CPT

## 2019-08-11 PROCEDURE — 85025 COMPLETE CBC W/AUTO DIFF WBC: CPT

## 2019-08-11 PROCEDURE — 36415 COLL VENOUS BLD VENIPUNCTURE: CPT

## 2019-08-11 PROCEDURE — 25000003 PHARM REV CODE 250: Performed by: HOSPITALIST

## 2019-08-11 PROCEDURE — 25000003 PHARM REV CODE 250: Performed by: EMERGENCY MEDICINE

## 2019-08-11 PROCEDURE — 25000003 PHARM REV CODE 250: Performed by: SURGERY

## 2019-08-11 PROCEDURE — 80048 BASIC METABOLIC PNL TOTAL CA: CPT

## 2019-08-11 RX ADMIN — GABAPENTIN 300 MG: 300 CAPSULE ORAL at 02:08

## 2019-08-11 RX ADMIN — PRAVASTATIN SODIUM 40 MG: 40 TABLET ORAL at 09:08

## 2019-08-11 RX ADMIN — GABAPENTIN 300 MG: 300 CAPSULE ORAL at 09:08

## 2019-08-11 RX ADMIN — OXYCODONE HYDROCHLORIDE 10 MG: 5 TABLET ORAL at 07:08

## 2019-08-11 RX ADMIN — CLOPIDOGREL BISULFATE 75 MG: 75 TABLET ORAL at 09:08

## 2019-08-11 RX ADMIN — OXYCODONE HYDROCHLORIDE 10 MG: 5 TABLET ORAL at 12:08

## 2019-08-11 RX ADMIN — POLYETHYLENE GLYCOL 3350 17 G: 17 POWDER, FOR SOLUTION ORAL at 09:08

## 2019-08-11 RX ADMIN — ASPIRIN 81 MG 81 MG: 81 TABLET ORAL at 09:08

## 2019-08-11 RX ADMIN — OXYCODONE HYDROCHLORIDE 10 MG: 5 TABLET ORAL at 02:08

## 2019-08-11 RX ADMIN — PANTOPRAZOLE SODIUM 40 MG: 40 TABLET, DELAYED RELEASE ORAL at 09:08

## 2019-08-11 RX ADMIN — AMLODIPINE BESYLATE 10 MG: 5 TABLET ORAL at 09:08

## 2019-08-11 RX ADMIN — ACETAMINOPHEN 650 MG: 325 TABLET, FILM COATED ORAL at 11:08

## 2019-08-11 RX ADMIN — DOCUSATE SODIUM 100 MG: 100 CAPSULE, LIQUID FILLED ORAL at 09:08

## 2019-08-11 RX ADMIN — ACETAMINOPHEN 650 MG: 325 TABLET, FILM COATED ORAL at 07:08

## 2019-08-11 RX ADMIN — OXYCODONE HYDROCHLORIDE 10 MG: 5 TABLET ORAL at 11:08

## 2019-08-11 RX ADMIN — ACETAMINOPHEN 650 MG: 325 TABLET, FILM COATED ORAL at 03:08

## 2019-08-11 RX ADMIN — TAMSULOSIN HYDROCHLORIDE 0.4 MG: 0.4 CAPSULE ORAL at 09:08

## 2019-08-11 NOTE — ASSESSMENT & PLAN NOTE
Patient with severe PVD that is symtomatic  Status post treatment with heparin gtt, now stopped  S/p left common iliac and external iliac artery stent placed by vascular surgery on 08/06/2019 with improved perfusion to the extremity  Patient started on aspirin and Plavix postprocedure  He has significant pain to the left leg that persists that limits his ability to walk  Continue PT and OT with recommendation for rehab placement  Patient is not a candidate for inpatient rehab so will continue discharge planning for the appropriate level of care post discharge

## 2019-08-11 NOTE — ASSESSMENT & PLAN NOTE
Continue on SSI.  Glucose goal during hospitalization between 140 and 180  Hemoglobin A1c 6.1  Continue current  management adequate control

## 2019-08-11 NOTE — PLAN OF CARE
Problem: Adult Inpatient Plan of Care  Goal: Plan of Care Review  Outcome: Ongoing (interventions implemented as appropriate)     08/11/19 6429   Plan of Care Review   Plan of Care Reviewed With patient   Progress improving     Patient oriented x 4 and cooperative; unsteady gait (uses rolling walker well).  Due to low weight and HOB elevated unable to keep bed alarm set.  Door open, telesitter in place, frequent checks, and pt teaching on calling for assist before getting OOB.  Pain managed w/ prn medication.  IVF d/c'd.  BCG monitored.  Tolerating ADA diet.  Voiding frequency w/ hesitancy. No notable changes.   Will continue to monitor.

## 2019-08-11 NOTE — SUBJECTIVE & OBJECTIVE
Interval History:  Patient complains of increased swelling and pain to the anterior tibial portion of his left foot.  The dorsum of his left foot however feels better and he has more motion of his toes.    Review of Systems   Musculoskeletal: Positive for arthralgias.        Foot pain     Objective:     Vital Signs (Most Recent):  Temp: 98.7 °F (37.1 °C) (08/11/19 0433)  Pulse: 84 (08/11/19 0433)  Resp: 20 (08/11/19 0433)  BP: (!) 167/72 (08/11/19 0433)  SpO2: 97 % (08/11/19 0433) Vital Signs (24h Range):  Temp:  [98.1 °F (36.7 °C)-98.7 °F (37.1 °C)] 98.7 °F (37.1 °C)  Pulse:  [84-99] 84  Resp:  [18-20] 20  SpO2:  [94 %-98 %] 97 %  BP: (125-167)/(61-72) 167/72     Weight: 49.4 kg (108 lb 14.5 oz)  Body mass index is 18.12 kg/m².    Intake/Output Summary (Last 24 hours) at 8/11/2019 0627  Last data filed at 8/11/2019 0057  Gross per 24 hour   Intake 720 ml   Output 2426 ml   Net -1706 ml      Physical Exam   Constitutional: He is oriented to person, place, and time.   Frail chronically ill-appearing male in no acute distress pleasant and cooperative with examination   HENT:   Head: Normocephalic and atraumatic.   Eyes: Pupils are equal, round, and reactive to light. EOM are normal.   Neck: Normal range of motion. Neck supple. No JVD present. No thyromegaly present.   Cardiovascular: Normal rate, regular rhythm, normal heart sounds and intact distal pulses. Exam reveals no gallop and no friction rub.   No murmur heard.  Pulmonary/Chest: Effort normal and breath sounds normal.   Abdominal: Soft. Bowel sounds are normal.   Musculoskeletal: He exhibits edema.   Trace edema of left foot.  1+ pulses palpated   Neurological: He is alert and oriented to person, place, and time.   Psychiatric: He has a normal mood and affect. His behavior is normal. Judgment and thought content normal.       Significant Labs:   CBC:   Lab 08/10/19  0530   WBC 11.43   HGB 12.4*   HCT 38.5*        CMP:   Lab 08/10/19  0530      K  3.9      CO2 26   *   BUN 7*   CREATININE 0.6   CALCIUM 9.0   ANIONGAP 7*   EGFRNONAA >60       Significant Imaging: I have reviewed and interpreted all pertinent imaging results/findings within the past 24 hours.

## 2019-08-11 NOTE — PROGRESS NOTES
[facility] Hospital Medicine  Progress Note    Patient Name: Jerry Galeana  MRN: [id number]  Patient Class: IP- Inpatient   Admission Date: 8/5/2019  Length of Stay: 6 days  Attending Physician: Zoraida Oakley MD  Primary Care Provider: Babak Bryan MD        Subjective:     Principal Problem:Critical lower limb ischemia      HPI:  This is a 59 y.o male patient, with a PMHx of diabetes mellitus, hypertension, and hyperlipidemia, presenting to the ED with a complaint of 10/10, sharp, chronic, left ankle and foot pain, numbness, and swelling, that began a few months ago, and worsened x4 days ago. He reports the pain at rest began yesterday. Patient states the pain is worse with movement. Patient reports he's having a surgery in x2 days on his left leg due to his atherosclerosis by , . Patient denies any fever, chills, shortness of breath, chest pain, neck pain, back pain, abdominal pain, rash, headaches, congestion, rhinorrhea, cough, sore throat, ear pain, eye pain, blurred vision, nausea, vomiting, diarrhea, dysuria, or any other associated symptoms. No prior Tx.. He reports he stopped smoking cigarettes x1 week ago. evaluated patient in ER recommending heparin drip,US arterial and X ray and plan for revascularization AM.    Overview/Hospital Course:  Mr. Galeana presented with left leg pain and swelling secondary to critical limb ischemia.  He was treated with heparin infusion and consult placed to vascular surgery.  Heparin infusion stopped prior to procedure.  Patient underwent stent placement to the left common iliac and left external iliac artery on 08/06/2019.  He was then started on aspirin and Plavix after stenting was complete, and patient had PT and OT to evaluate and treat with recommendation for rehab placement.  Discharge planning in progress.    Interval History:  Patient complains of increased swelling and pain to the anterior tibial portion of his left foot.  The  dorsum of his left foot however feels better and he has more motion of his toes.    Review of Systems   Musculoskeletal: Positive for arthralgias.        Foot pain     Objective:     Vital Signs (Most Recent):  Temp: 98.7 °F (37.1 °C) (08/11/19 0433)  Pulse: 84 (08/11/19 0433)  Resp: 20 (08/11/19 0433)  BP: (!) 167/72 (08/11/19 0433)  SpO2: 97 % (08/11/19 0433) Vital Signs (24h Range):  Temp:  [98.1 °F (36.7 °C)-98.7 °F (37.1 °C)] 98.7 °F (37.1 °C)  Pulse:  [84-99] 84  Resp:  [18-20] 20  SpO2:  [94 %-98 %] 97 %  BP: (125-167)/(61-72) 167/72     Weight: 49.4 kg (108 lb 14.5 oz)  Body mass index is 18.12 kg/m².    Intake/Output Summary (Last 24 hours) at 8/11/2019 0627  Last data filed at 8/11/2019 0057  Gross per 24 hour   Intake 720 ml   Output 2426 ml   Net -1706 ml      Physical Exam   Constitutional: He is oriented to person, place, and time.   Frail chronically ill-appearing male in no acute distress pleasant and cooperative with examination   HENT:   Head: Normocephalic and atraumatic.   Eyes: Pupils are equal, round, and reactive to light. EOM are normal.   Neck: Normal range of motion. Neck supple. No JVD present. No thyromegaly present.   Cardiovascular: Normal rate, regular rhythm, normal heart sounds and intact distal pulses. Exam reveals no gallop and no friction rub.   No murmur heard.  Pulmonary/Chest: Effort normal and breath sounds normal.   Abdominal: Soft. Bowel sounds are normal.   Musculoskeletal: He exhibits edema.   Trace edema of left foot.  1+ pulses palpated   Neurological: He is alert and oriented to person, place, and time.   Psychiatric: He has a normal mood and affect. His behavior is normal. Judgment and thought content normal.       Significant Labs:   CBC:   Lab 08/10/19  0530   WBC 11.43   HGB 12.4*   HCT 38.5*        CMP:   Lab 08/10/19  0530      K 3.9      CO2 26   *   BUN 7*   CREATININE 0.6   CALCIUM 9.0   ANIONGAP 7*   EGFRNONAA >60       Significant  Imaging: I have reviewed and interpreted all pertinent imaging results/findings within the past 24 hours.      Assessment/Plan:      * Critical lower limb ischemia  Patient with severe PVD that is symtomatic  Status post treatment with heparin gtt, now stopped  S/p left common iliac and external iliac artery stent placed by vascular surgery on 08/06/2019 with improved perfusion to the extremity  Patient started on aspirin and Plavix postprocedure  He has significant pain to the left leg that persists that limits his ability to walk  Continue PT and OT with recommendation for rehab placement  Patient is not a candidate for inpatient rehab so will continue discharge planning for the appropriate level of care post discharge    Essential hypertension  Continue current home medications    DM (diabetes mellitus) type II, controlled, with peripheral vascular disorder  Continue on SSI.  Glucose goal during hospitalization between 140 and 180  Hemoglobin A1c 6.1  Continue current  management adequate control    Tobacco abuse  Smoking cessation counseling done for > 3 minutes    Mixed hyperlipidemia  Continue atorvastatin    BPH (benign prostatic hyperplasia)  Continue Flomax        VTE Risk Mitigation (From admission, onward)        Ordered     IP VTE HIGH RISK PATIENT  Once      08/05/19 1533                Zoraida Oakley MD  Department of Hospital Medicine   [facility]

## 2019-08-11 NOTE — ASSESSMENT & PLAN NOTE
Patient with severe PVD that is symtomatic  Status post treatment with heparin gtt, now stopped  S/p left common iliac and external iliac artery stent placed by vascular surgery on 08/06/2019 with improved perfusion to the extremity  Patient started on aspirin and Plavix postprocedure  He has significant pain to the left leg that persists that limits his ability to walk  Continue PT and OT with recommendation for rehab placement  Patient is not a candidate for inpatient rehab so will continue participation with therapy for a safe discharge home

## 2019-08-11 NOTE — PROGRESS NOTES
[facility] Hospital Medicine  Progress Note    Patient Name: Jerry Galeana  MRN: [id number]  Patient Class: IP- Inpatient   Admission Date: 8/5/2019  Length of Stay: 6 days  Attending Physician: Zoraida Oakley MD  Primary Care Provider: Babak Bryan MD        Subjective:     Principal Problem:Critical lower limb ischemia      HPI:  This is a 59 y.o male patient, with a PMHx of diabetes mellitus, hypertension, and hyperlipidemia, presenting to the ED with a complaint of 10/10, sharp, chronic, left ankle and foot pain, numbness, and swelling, that began a few months ago, and worsened x4 days ago. He reports the pain at rest began yesterday. Patient states the pain is worse with movement. Patient reports he's having a surgery in x2 days on his left leg due to his atherosclerosis by , . Patient denies any fever, chills, shortness of breath, chest pain, neck pain, back pain, abdominal pain, rash, headaches, congestion, rhinorrhea, cough, sore throat, ear pain, eye pain, blurred vision, nausea, vomiting, diarrhea, dysuria, or any other associated symptoms. No prior Tx.. He reports he stopped smoking cigarettes x1 week ago. evaluated patient in ER recommending heparin drip,US arterial and X ray and plan for revascularization AM.    Overview/Hospital Course:  Mr. Galeana presented with left leg pain and swelling secondary to critical limb ischemia.  He was treated with heparin infusion and consult placed to vascular surgery.  Heparin infusion stopped prior to procedure.  Patient underwent stent placement to the left common iliac and left external iliac artery on 08/06/2019.  He was then started on aspirin and Plavix after stenting was complete, and patient had PT and OT to evaluate and treat with recommendation for rehab placement.  Patient is not a candidate for in-patient rehabilitation unit.  Patient will continue participation with therapy for a safe discharge home.    Interval  History:  Patient still has some complaints of weakness.    Review of Systems   Musculoskeletal: Positive for arthralgias.        Foot pain     Objective:     Vital Signs (Most Recent):  Temp: 98.4 °F (36.9 °C) (08/11/19 1202)  Pulse: 83 (08/11/19 1202)  Resp: 18 (08/11/19 1202)  BP: (!) 153/67 (08/11/19 1202)  SpO2: 96 % (08/11/19 1202) Vital Signs (24h Range):  Temp:  [98.1 °F (36.7 °C)-98.7 °F (37.1 °C)] 98.4 °F (36.9 °C)  Pulse:  [83-95] 83  Resp:  [18-20] 18  SpO2:  [94 %-98 %] 96 %  BP: (126-167)/(61-72) 153/67     Weight: 49.4 kg (108 lb 14.5 oz)  Body mass index is 18.12 kg/m².    Intake/Output Summary (Last 24 hours) at 8/11/2019 1535  Last data filed at 8/11/2019 1214  Gross per 24 hour   Intake 960 ml   Output 3501 ml   Net -2541 ml      Physical Exam   Constitutional: He is oriented to person, place, and time.   Frail chronically ill-appearing male in no acute distress pleasant and cooperative with examination   HENT:   Head: Normocephalic and atraumatic.   Eyes: Pupils are equal, round, and reactive to light. EOM are normal.   Neck: Normal range of motion. Neck supple. No JVD present. No thyromegaly present.   Cardiovascular: Normal rate, regular rhythm, normal heart sounds and intact distal pulses. Exam reveals no gallop and no friction rub.   No murmur heard.  Pulmonary/Chest: Effort normal and breath sounds normal.   Abdominal: Soft. Bowel sounds are normal.   Musculoskeletal: He exhibits edema.   Trace edema of left foot.  1+ pulses palpated   Neurological: He is alert and oriented to person, place, and time.   Psychiatric: He has a normal mood and affect. His behavior is normal. Judgment and thought content normal.       Significant Labs:   CBC:   Lab 08/10/19  0530   WBC 11.43   HGB 12.4*   HCT 38.5*        CMP:   Lab 08/10/19  0530      K 3.9      CO2 26   *   BUN 7*   CREATININE 0.6   CALCIUM 9.0   ANIONGAP 7*   EGFRNONAA >60       Significant Imaging: I have reviewed  and interpreted all pertinent imaging results/findings within the past 24 hours.      Assessment/Plan:      * Critical lower limb ischemia  Patient with severe PVD that is symtomatic  Status post treatment with heparin gtt, now stopped  S/p left common iliac and external iliac artery stent placed by vascular surgery on 08/06/2019 with improved perfusion to the extremity  Patient started on aspirin and Plavix postprocedure  He has significant pain to the left leg that persists that limits his ability to walk  Continue PT and OT with recommendation for rehab placement  Patient is not a candidate for inpatient rehab so will continue participation with therapy for a safe discharge home    Essential hypertension  Continue current home medications    DM (diabetes mellitus) type II, controlled, with peripheral vascular disorder  Continue on SSI.  Glucose goal during hospitalization between 140 and 180  Hemoglobin A1c 6.1  Continue current  management adequate control    Tobacco abuse  Smoking cessation counseling done for > 3 minutes    Mixed hyperlipidemia  Continue atorvastatin    BPH (benign prostatic hyperplasia)  Continue Flomax        VTE Risk Mitigation (From admission, onward)        Ordered     IP VTE HIGH RISK PATIENT  Once      08/05/19 1820                Zoraida Oakley MD  Department of Hospital Medicine   [facility]

## 2019-08-11 NOTE — SUBJECTIVE & OBJECTIVE
Interval History:  Patient still has some complaints of weakness.    Review of Systems   Musculoskeletal: Positive for arthralgias.        Foot pain     Objective:     Vital Signs (Most Recent):  Temp: 98.4 °F (36.9 °C) (08/11/19 1202)  Pulse: 83 (08/11/19 1202)  Resp: 18 (08/11/19 1202)  BP: (!) 153/67 (08/11/19 1202)  SpO2: 96 % (08/11/19 1202) Vital Signs (24h Range):  Temp:  [98.1 °F (36.7 °C)-98.7 °F (37.1 °C)] 98.4 °F (36.9 °C)  Pulse:  [83-95] 83  Resp:  [18-20] 18  SpO2:  [94 %-98 %] 96 %  BP: (126-167)/(61-72) 153/67     Weight: 49.4 kg (108 lb 14.5 oz)  Body mass index is 18.12 kg/m².    Intake/Output Summary (Last 24 hours) at 8/11/2019 1535  Last data filed at 8/11/2019 1214  Gross per 24 hour   Intake 960 ml   Output 3501 ml   Net -2541 ml      Physical Exam   Constitutional: He is oriented to person, place, and time.   Frail chronically ill-appearing male in no acute distress pleasant and cooperative with examination   HENT:   Head: Normocephalic and atraumatic.   Eyes: Pupils are equal, round, and reactive to light. EOM are normal.   Neck: Normal range of motion. Neck supple. No JVD present. No thyromegaly present.   Cardiovascular: Normal rate, regular rhythm, normal heart sounds and intact distal pulses. Exam reveals no gallop and no friction rub.   No murmur heard.  Pulmonary/Chest: Effort normal and breath sounds normal.   Abdominal: Soft. Bowel sounds are normal.   Musculoskeletal: He exhibits edema.   Trace edema of left foot.  1+ pulses palpated   Neurological: He is alert and oriented to person, place, and time.   Psychiatric: He has a normal mood and affect. His behavior is normal. Judgment and thought content normal.       Significant Labs:   CBC:   Lab 08/10/19  0530   WBC 11.43   HGB 12.4*   HCT 38.5*        CMP:   Lab 08/10/19  0530      K 3.9      CO2 26   *   BUN 7*   CREATININE 0.6   CALCIUM 9.0   ANIONGAP 7*   EGFRNONAA >60       Significant Imaging: I have  reviewed and interpreted all pertinent imaging results/findings within the past 24 hours.

## 2019-08-11 NOTE — PLAN OF CARE
Problem: Adult Inpatient Plan of Care  Goal: Plan of Care Review  Outcome: Ongoing (interventions implemented as appropriate)  Pt stable overnight. Continues to report frequent and severe pain to LLE. Site is warm, slow cap refill, +1 pulse. Treated with PRN oxy IR with little relief. Pt states dissatisfaction with pain regimen; advised pt to speak with MD this morning on rounds regarding his pain control. VSSAF. , no SSI required. Pt refused IVF last night d/t wanting to sleep. UO WDL. LBM 8/10 evening. No acute events overnight.

## 2019-08-12 LAB
ANION GAP SERPL CALC-SCNC: 8 MMOL/L (ref 8–16)
BASOPHILS # BLD AUTO: 0.02 K/UL (ref 0–0.2)
BASOPHILS NFR BLD: 0.2 % (ref 0–1.9)
BUN SERPL-MCNC: 6 MG/DL (ref 8–23)
CALCIUM SERPL-MCNC: 9.1 MG/DL (ref 8.7–10.5)
CHLORIDE SERPL-SCNC: 104 MMOL/L (ref 95–110)
CO2 SERPL-SCNC: 28 MMOL/L (ref 23–29)
CREAT SERPL-MCNC: 0.8 MG/DL (ref 0.5–1.4)
DIFFERENTIAL METHOD: ABNORMAL
EOSINOPHIL # BLD AUTO: 0.2 K/UL (ref 0–0.5)
EOSINOPHIL NFR BLD: 2.7 % (ref 0–8)
ERYTHROCYTE [DISTWIDTH] IN BLOOD BY AUTOMATED COUNT: 13.7 % (ref 11.5–14.5)
EST. GFR  (AFRICAN AMERICAN): >60 ML/MIN/1.73 M^2
EST. GFR  (NON AFRICAN AMERICAN): >60 ML/MIN/1.73 M^2
GLUCOSE SERPL-MCNC: 225 MG/DL (ref 70–110)
HCT VFR BLD AUTO: 38.8 % (ref 40–54)
HGB BLD-MCNC: 12.6 G/DL (ref 14–18)
LYMPHOCYTES # BLD AUTO: 2.2 K/UL (ref 1–4.8)
LYMPHOCYTES NFR BLD: 24.2 % (ref 18–48)
MCH RBC QN AUTO: 29.6 PG (ref 27–31)
MCHC RBC AUTO-ENTMCNC: 32.5 G/DL (ref 32–36)
MCV RBC AUTO: 91 FL (ref 82–98)
MONOCYTES # BLD AUTO: 1.1 K/UL (ref 0.3–1)
MONOCYTES NFR BLD: 12.3 % (ref 4–15)
NEUTROPHILS # BLD AUTO: 5.5 K/UL (ref 1.8–7.7)
NEUTROPHILS NFR BLD: 60.9 % (ref 38–73)
PLATELET # BLD AUTO: 408 K/UL (ref 150–350)
PMV BLD AUTO: 10.9 FL (ref 9.2–12.9)
POCT GLUCOSE: 163 MG/DL (ref 70–110)
POCT GLUCOSE: 166 MG/DL (ref 70–110)
POCT GLUCOSE: 172 MG/DL (ref 70–110)
POCT GLUCOSE: 196 MG/DL (ref 70–110)
POTASSIUM SERPL-SCNC: 3.9 MMOL/L (ref 3.5–5.1)
RBC # BLD AUTO: 4.25 M/UL (ref 4.6–6.2)
SODIUM SERPL-SCNC: 140 MMOL/L (ref 136–145)
WBC # BLD AUTO: 9.04 K/UL (ref 3.9–12.7)

## 2019-08-12 PROCEDURE — 97110 THERAPEUTIC EXERCISES: CPT

## 2019-08-12 PROCEDURE — 25000003 PHARM REV CODE 250: Performed by: HOSPITALIST

## 2019-08-12 PROCEDURE — 36415 COLL VENOUS BLD VENIPUNCTURE: CPT

## 2019-08-12 PROCEDURE — 25000003 PHARM REV CODE 250: Performed by: SURGERY

## 2019-08-12 PROCEDURE — 85025 COMPLETE CBC W/AUTO DIFF WBC: CPT

## 2019-08-12 PROCEDURE — 25000003 PHARM REV CODE 250: Performed by: EMERGENCY MEDICINE

## 2019-08-12 PROCEDURE — 11000001 HC ACUTE MED/SURG PRIVATE ROOM

## 2019-08-12 PROCEDURE — 63600175 PHARM REV CODE 636 W HCPCS: Performed by: HOSPITALIST

## 2019-08-12 PROCEDURE — 97116 GAIT TRAINING THERAPY: CPT

## 2019-08-12 PROCEDURE — 80048 BASIC METABOLIC PNL TOTAL CA: CPT

## 2019-08-12 PROCEDURE — S5571 INSULIN DISPOS PEN 3 ML: HCPCS | Performed by: HOSPITALIST

## 2019-08-12 PROCEDURE — 97535 SELF CARE MNGMENT TRAINING: CPT

## 2019-08-12 RX ORDER — ENOXAPARIN SODIUM 100 MG/ML
40 INJECTION SUBCUTANEOUS EVERY 24 HOURS
Status: DISCONTINUED | OUTPATIENT
Start: 2019-08-12 | End: 2019-08-14 | Stop reason: HOSPADM

## 2019-08-12 RX ORDER — INSULIN ASPART 100 [IU]/ML
5 INJECTION, SOLUTION INTRAVENOUS; SUBCUTANEOUS
Status: DISCONTINUED | OUTPATIENT
Start: 2019-08-12 | End: 2019-08-14 | Stop reason: HOSPADM

## 2019-08-12 RX ADMIN — OXYCODONE HYDROCHLORIDE 10 MG: 5 TABLET ORAL at 08:08

## 2019-08-12 RX ADMIN — OXYCODONE HYDROCHLORIDE 10 MG: 5 TABLET ORAL at 09:08

## 2019-08-12 RX ADMIN — OXYCODONE HYDROCHLORIDE 10 MG: 5 TABLET ORAL at 04:08

## 2019-08-12 RX ADMIN — OXYCODONE HYDROCHLORIDE 10 MG: 5 TABLET ORAL at 01:08

## 2019-08-12 RX ADMIN — CLONIDINE HYDROCHLORIDE 0.1 MG: 0.1 TABLET ORAL at 05:08

## 2019-08-12 RX ADMIN — DOCUSATE SODIUM 100 MG: 100 CAPSULE, LIQUID FILLED ORAL at 09:08

## 2019-08-12 RX ADMIN — DOCUSATE SODIUM 100 MG: 100 CAPSULE, LIQUID FILLED ORAL at 08:08

## 2019-08-12 RX ADMIN — POLYETHYLENE GLYCOL 3350 17 G: 17 POWDER, FOR SOLUTION ORAL at 08:08

## 2019-08-12 RX ADMIN — CLOPIDOGREL BISULFATE 75 MG: 75 TABLET ORAL at 08:08

## 2019-08-12 RX ADMIN — PANTOPRAZOLE SODIUM 40 MG: 40 TABLET, DELAYED RELEASE ORAL at 08:08

## 2019-08-12 RX ADMIN — TAMSULOSIN HYDROCHLORIDE 0.4 MG: 0.4 CAPSULE ORAL at 08:08

## 2019-08-12 RX ADMIN — GABAPENTIN 300 MG: 300 CAPSULE ORAL at 03:08

## 2019-08-12 RX ADMIN — INSULIN DETEMIR 15 UNITS: 100 INJECTION, SOLUTION SUBCUTANEOUS at 08:08

## 2019-08-12 RX ADMIN — ASPIRIN 81 MG 81 MG: 81 TABLET ORAL at 08:08

## 2019-08-12 RX ADMIN — GABAPENTIN 300 MG: 300 CAPSULE ORAL at 09:08

## 2019-08-12 RX ADMIN — GABAPENTIN 300 MG: 300 CAPSULE ORAL at 08:08

## 2019-08-12 RX ADMIN — OXYCODONE HYDROCHLORIDE 10 MG: 5 TABLET ORAL at 05:08

## 2019-08-12 RX ADMIN — PRAVASTATIN SODIUM 40 MG: 40 TABLET ORAL at 09:08

## 2019-08-12 NOTE — PROGRESS NOTES
[facility] Hospital Medicine  Progress Note    Patient Name: Jerry Galeana  MRN: [id number]  Patient Class: IP- Inpatient   Admission Date: 8/5/2019  Length of Stay: 7 days  Attending Physician: Mile Zavala MD  Primary Care Provider: Babak Bryan MD        Subjective:     Principal Problem:Critical lower limb ischemia      HPI:  This is a 59 y.o male patient, with a PMHx of diabetes mellitus, hypertension, and hyperlipidemia, presenting to the ED with a complaint of 10/10, sharp, chronic, left ankle and foot pain, numbness, and swelling, that began a few months ago, and worsened x4 days ago. He reports the pain at rest began yesterday. Patient states the pain is worse with movement. Patient reports he's having a surgery in x2 days on his left leg due to his atherosclerosis by , . Patient denies any fever, chills, shortness of breath, chest pain, neck pain, back pain, abdominal pain, rash, headaches, congestion, rhinorrhea, cough, sore throat, ear pain, eye pain, blurred vision, nausea, vomiting, diarrhea, dysuria, or any other associated symptoms. No prior Tx.. He reports he stopped smoking cigarettes x1 week ago. evaluated patient in ER recommending heparin drip,US arterial and X ray and plan for revascularization AM.    Overview/Hospital Course:  Mr. Galeana presented with left leg pain and swelling secondary to critical limb ischemia.  He was treated with heparin infusion and consult placed to vascular surgery.  Heparin infusion stopped prior to procedure.  Patient underwent stent placement to the left common iliac and left external iliac artery on 08/06/2019.  He was then started on aspirin and Plavix after stenting was complete, and patient had PT and OT to evaluate and treat with recommendation for rehab placement.  Patient is not a candidate for in-patient rehabilitation unit.  Patient will continue participation with therapy for a safe discharge home.  Still has  severe LLL pain.    Interval History:  Patient still has some complaints of weakness.Still has severe LLL pain.    Review of Systems   Constitutional: Positive for activity change and fatigue.   HENT: Negative for congestion and dental problem.    Eyes: Negative for discharge and itching.   Respiratory: Negative for apnea and chest tightness.    Cardiovascular: Negative for chest pain.   Gastrointestinal: Negative for abdominal distention.   Musculoskeletal: Positive for arthralgias.        Foot pain   Neurological: Positive for weakness. Negative for dizziness and facial asymmetry.   Psychiatric/Behavioral: Negative for agitation and behavioral problems.     Objective:     Vital Signs (Most Recent):  Temp: 97.9 °F (36.6 °C) (08/12/19 0519)  Pulse: 81 (08/12/19 0519)  Resp: 19 (08/12/19 0519)  BP: (!) 141/63 (08/12/19 0519)  SpO2: 96 % (08/12/19 0519) Vital Signs (24h Range):  Temp:  [97.9 °F (36.6 °C)-98.5 °F (36.9 °C)] 97.9 °F (36.6 °C)  Pulse:  [81-87] 81  Resp:  [18-22] 19  SpO2:  [95 %-98 %] 96 %  BP: (132-153)/(61-86) 141/63     Weight: 49.4 kg (108 lb 14.5 oz)  Body mass index is 18.12 kg/m².    Intake/Output Summary (Last 24 hours) at 8/12/2019 0840  Last data filed at 8/12/2019 0600  Gross per 24 hour   Intake 480 ml   Output 2050 ml   Net -1570 ml      Physical Exam   Constitutional: He is oriented to person, place, and time.   Frail chronically ill-appearing male in no acute distress pleasant and cooperative with examination   HENT:   Head: Normocephalic and atraumatic.   Eyes: Pupils are equal, round, and reactive to light. EOM are normal.   Neck: Normal range of motion. Neck supple. No JVD present. No thyromegaly present.   Cardiovascular: Normal rate, regular rhythm, normal heart sounds and intact distal pulses. Exam reveals no gallop and no friction rub.   No murmur heard.  Pulmonary/Chest: Effort normal and breath sounds normal.   Abdominal: Soft. Bowel sounds are normal.   Musculoskeletal: He  exhibits edema.   Trace edema of left foot.  1+ pulses palpated   Neurological: He is alert and oriented to person, place, and time.   Psychiatric: He has a normal mood and affect. His behavior is normal. Judgment and thought content normal.       Significant Labs:   CBC:   Lab 08/10/19  0530   WBC 11.43   HGB 12.4*   HCT 38.5*        CMP:   Lab 08/10/19  0530      K 3.9      CO2 26   *   BUN 7*   CREATININE 0.6   CALCIUM 9.0   ANIONGAP 7*   EGFRNONAA >60       Significant Imaging: I have reviewed and interpreted all pertinent imaging results/findings within the past 24 hours.      Assessment/Plan:      * Critical lower limb ischemia  Patient with severe PVD that is symtomatic  Status post treatment with heparin gtt, now stopped  S/p left common iliac and external iliac artery stent placed by vascular surgery on 08/06/2019 with improved perfusion to the extremity  Patient started on aspirin and Plavix postprocedure  He has significant pain to the left leg that persists that limits his ability to walk  Continue PT and OT with recommendation for rehab placement  Patient is not a candidate for inpatient rehab so will continue participation with therapy for a safe discharge home,  Still has severe LLL pain.    Tobacco abuse  Smoking cessation counseling done for > 3 minutes    Mixed hyperlipidemia  Continue atorvastatin    BPH (benign prostatic hyperplasia)  Continue Flomax      DM (diabetes mellitus) type II, controlled, with peripheral vascular disorder  Continue on SSI.  Glucose goal during hospitalization between 140 and 180  Hemoglobin A1c 6.1  Continue current  management adequate control    Essential hypertension  Continue current home medications      VTE Risk Mitigation (From admission, onward)        Ordered     enoxaparin injection 40 mg  Daily      08/12/19 0816     IP VTE HIGH RISK PATIENT  Once      08/05/19 1821                Mile Zavala MD  Department of Hospital  Medicine   [facility]

## 2019-08-12 NOTE — PLAN OF CARE
Problem: Physical Therapy Goal  Goal: Physical Therapy Goal  Goals to be met by: 2019     Patient will increase functional independence with mobility by performin. Supine to sit with Modified [place]- met 8/10  2. Sit to stand transfer with Modified [place]  3. Bed to chair transfer with Modified [place]   4. Gait  x 25-50' feet with Modified [place] using Rolling Walker.   5. Wheelchair propulsion x150 feet with Modified [place] using bilateral uppper extremities  6. Lower extremity exercise program x10 reps per handout, with independence- met 19      Outcome: Ongoing (interventions implemented as appropriate)  Pt continues to display difficulty WB on LLE with ambulation.  Pt is a fall risk and requires assistance for all OOB mobility.  Pt ambulated ~104ft with RW, CGA with PWB-WBAT to LLE 2/2 increased pain.  Pt would continue to benefit from skilled PT services to improved current level of function 2/2 pt living alone and being a fall risk.

## 2019-08-12 NOTE — PROGRESS NOTES
1149 TN contacted Livia Banuelos of [facility] at [phone] to inquire of an update from peer to peer review; left message with name and number for TN to be contacted.    1230 TN met with pt providing the above update and explaining the option of Outpatient Physical Therapy. Pt stated his only mode of transportation would be Medicaid Transportation. Pt is aware that he has to schedule 48 hours in advance with Medicaid. TN answered all questions.

## 2019-08-12 NOTE — PLAN OF CARE
Problem: Occupational Therapy Goal  Goal: Occupational Therapy Goal  Goals to be met by: 08/21/19     Patient will increase functional independence with ADLs by performing:    UE Dressing with Modified [place].  LE Dressing with Supervision.  Grooming while seated with Set-up Assistance. (Met 8/12/2019)  Toileting from bedside commode with Stand-by Assistance for hygiene and clothing management.   Sitting at edge of bed x10 minutes with Supervision. (Met 8/12/2019)  Step transfer with Stand-by Assistance  Toilet transfer to bedside commode with Stand-by Assistance.  Upper extremity exercise program x15 reps per handout, with independence. (Goal Met 8/9/2019)      Outcome: Ongoing (interventions implemented as appropriate)  The pt has met goal #3, and #5.  The pt is making steady progress towards meeting all remaining goals.  Continue OT per POC.

## 2019-08-12 NOTE — PT/OT/SLP PROGRESS
Occupational Therapy   Treatment    Name: Jerry Galeana  MRN: [id number]  Admitting Diagnosis:  Critical lower limb ischemia       Recommendations:     Discharge Recommendations: rehabilitation facility  Discharge Equipment Recommendations:  (ongoing assessment pending pt progress)  Barriers to discharge:  Decreased caregiver support    Assessment:     Jerry Galeana is a 59 y.o. male with a medical diagnosis of Critical lower limb ischemia.  He presents with decreased I with ADLs, and functional transfers. Performance deficits affecting function are weakness, edema, impaired joint extensibility, impaired skin, impaired balance, gait instability, impaired self care skills, impaired functional mobilty, impaired sensation, decreased lower extremity function, pain.     Rehab Prognosis:  Good; patient would benefit from acute skilled OT services to address these deficits and reach maximum level of function.       Plan:     Patient to be seen 5 x/week to address the above listed problems via self-care/home management, therapeutic activities, therapeutic exercises  · Plan of Care Expires: 08/21/19  · Plan of Care Reviewed with: patient    Subjective     Pain/Comfort:  Pain Rating 1: 9/10  Location - Side 1: Left  Location - Orientation 1: generalized  Location 1: leg  Pain Addressed 1: Pre-medicate for activity, Distraction, Reposition  Pain Rating Post-Intervention 1: 9/10    Objective:     Communicated with: Dalia prior to session.  Patient found supine with peripheral IV upon OT entry to room.    General Precautions: Standard, fall   Orthopedic Precautions:N/A   Braces: N/A     Occupational Performance:     Bed Mobility:    · Patient completed Rolling/Turning to Left with  supervision  · Patient completed Scooting/Bridging with supervision  · Patient completed Supine to Sit with supervision     Functional Mobility/Transfers:  · Not tested    Activities of Daily Living:  · Grooming:Setup Assistance  · Upper Body Dressing:  Setup Assistance      [facility] 6 Click ADL: 21    Treatment & Education:  The pt was reeducated on safety when performing functional transfers.  The pt performed 2 sets of 10 BUE green theraband exs per handout while seated at the EOB.    Patient left HOB elevated with all lines intact and call button in reachEducation:      GOALS:   Multidisciplinary Problems     Occupational Therapy Goals        Problem: Occupational Therapy Goal    Goal Priority Disciplines Outcome Interventions   Occupational Therapy Goal     OT, PT/OT Ongoing (interventions implemented as appropriate)    Description:  Goals to be met by: 08/21/19     Patient will increase functional independence with ADLs by performing:    UE Dressing with Modified [place].  LE Dressing with Supervision.  Grooming while seated with Set-up Assistance. (Met 8/12/2019)  Toileting from bedside commode with Stand-by Assistance for hygiene and clothing management.   Sitting at edge of bed x10 minutes with Supervision. (Met 8/12/2019)  Step transfer with Stand-by Assistance  Toilet transfer to bedside commode with Stand-by Assistance.  Upper extremity exercise program x15 reps per handout, with independence. (Goal Met 8/9/2019)                        Time Tracking:     OT Date of Treatment: 08/12/19  OT Start Time: 1335  OT Stop Time: 1400  OT Total Time (min): 25 min    Billable Minutes:Self Care/Home Management 15 minutes  Therapeutic Exercise 10 minutes    Susanna Becerra OT  8/12/2019

## 2019-08-12 NOTE — ASSESSMENT & PLAN NOTE
Patient with severe PVD that is symtomatic  Status post treatment with heparin gtt, now stopped  S/p left common iliac and external iliac artery stent placed by vascular surgery on 08/06/2019 with improved perfusion to the extremity  Patient started on aspirin and Plavix postprocedure  He has significant pain to the left leg that persists that limits his ability to walk  Continue PT and OT with recommendation for rehab placement  Patient is not a candidate for inpatient rehab so will continue participation with therapy for a safe discharge home,  Still has severe LLL pain.

## 2019-08-12 NOTE — PT/OT/SLP PROGRESS
"Physical Therapy Treatment    Patient Name:  Jerry Galeana   MRN:  [id number]    Recommendations:     Discharge Recommendations:  rehabilitation facility   Discharge Equipment Recommendations: commode, shower chair, wheelchair, manual  Barriers to discharge: Decreased caregiver support and decreased mobility    Assessment:     Jerry Galeana is a 59 y.o. male admitted with a medical diagnosis of Critical lower limb ischemia.  He presents with the following impairments/functional limitations:  weakness, impaired endurance, impaired self care skills, impaired balance, decreased safety awareness, decreased ROM, impaired skin, impaired functional mobilty, gait instability, pain, edema, decreased lower extremity function, impaired sensation.  Pt continues to display difficulty WB on LLE with ambulation.  Pt is a fall risk and requires assistance for all OOB mobility.  Pt ambulated ~104ft with RW, CGA with PWB-WBAT to LLE 2/2 increased pain.  Pt would continue to benefit from skilled PT services to improved current level of function 2/2 pt living alone and being a fall risk.    Rehab Prognosis: Good; patient would benefit from acute skilled PT services to address these deficits and reach maximum level of function.    Recent Surgery: * No surgery found *      Plan:     During this hospitalization, patient to be seen 6 x/week to address the identified rehab impairments via gait training, therapeutic activities, therapeutic exercises, wheelchair management/training and progress toward the following goals:    · Plan of Care Expires:  08/21/19    Subjective     Chief Complaint: denied rehab  Patient/Family Comments/goals: Pt states " I live by myself."  Pain/Comfort:  · Pain Rating 1: 6/10  · Location - Side 1: Left  · Location 1: leg  · Pain Addressed 1: Pre-medicate for activity, Reposition, Distraction, Cessation of Activity      Objective:     Communicated with pt's nurse, Brenna, prior to session.  Patient found HOB elevated " with peripheral IV upon PT entry to room.     General Precautions: Standard, fall, diabetic   Orthopedic Precautions:N/A   Braces:       Functional Mobility:  · Bed Mobility:     · Scooting: modified independence  · Supine to Sit: modified independence  · Transfers:     · Sit to Stand:  contact guard assistance with rolling walker  · Gait: Pt ambulated ~104ft with RW, CGA with PWB-WBAT to LLE 2/2 pain.  Requires cues for promotion of heel toe, weight distribution to BUEs, and RW management/safety.  Pt with 3 point gait pattern and decreased chacho, step length, velocity of limb, weight shifting ability, postural control and safety.  · Balance: good sitting balance and fair standing      AM-PAC 6 CLICK MOBILITY  Turning over in bed (including adjusting bedclothes, sheets and blankets)?: 4  Sitting down on and standing up from a chair with arms (e.g., wheelchair, bedside commode, etc.): 3  Moving from lying on back to sitting on the side of the bed?: 4  Moving to and from a bed to a chair (including a wheelchair)?: 3  Need to walk in hospital room?: 3  Climbing 3-5 steps with a railing?: 2  Basic Mobility Total Score: 19       Therapeutic Activities and Exercises:   Pt performed BM, transfers, gait, and therex.    Pt performed therex to BLEs x25 reps AROM including: marching, LAQs, heel raises, toe raises, hip abduction/adduction, SLRs    Pt denied rehab services and spoke with supervising PT, Quyen Nuñez, in regards to discharge recommendations if pt needs to go home.  Patient left reclined in [place] with BLEs elevated with all lines intact, call button in reach, chair alarm on and pt's nurse, lorene Ceja notified..    GOALS:   Multidisciplinary Problems     Physical Therapy Goals        Problem: Physical Therapy Goal    Goal Priority Disciplines Outcome Goal Variances Interventions   Physical Therapy Goal     PT, PT/OT Ongoing (interventions implemented as appropriate)     Description:  Goals to be met by:  2019     Patient will increase functional independence with mobility by performin. Supine to sit with Modified [place]- met 8/10  2. Sit to stand transfer with Modified [place]  3. Bed to chair transfer with Modified [place]   4. Gait  x 25-50' feet with Modified [place] using Rolling Walker.   5. Wheelchair propulsion x150 feet with Modified [place] using bilateral uppper extremities  6. Lower extremity exercise program x10 reps per handout, with independence                       Time Tracking:     PT Received On: 19  PT Start Time: 1119     PT Stop Time: 1149  PT Total Time (min): 30 min     Billable Minutes: Gait Training 15 and Therapeutic Exercise 15    Treatment Type: Treatment  PT/PTA: PTA     PTA Visit Number: 4     Jo-Ann Melany, PTA  2019

## 2019-08-12 NOTE — SUBJECTIVE & OBJECTIVE
Interval History:  Patient still has some complaints of weakness.Still has severe LLL pain.    Review of Systems   Constitutional: Positive for activity change and fatigue.   HENT: Negative for congestion and dental problem.    Eyes: Negative for discharge and itching.   Respiratory: Negative for apnea and chest tightness.    Cardiovascular: Negative for chest pain.   Gastrointestinal: Negative for abdominal distention.   Musculoskeletal: Positive for arthralgias.        Foot pain   Neurological: Positive for weakness. Negative for dizziness and facial asymmetry.   Psychiatric/Behavioral: Negative for agitation and behavioral problems.     Objective:     Vital Signs (Most Recent):  Temp: 97.9 °F (36.6 °C) (08/12/19 0519)  Pulse: 81 (08/12/19 0519)  Resp: 19 (08/12/19 0519)  BP: (!) 141/63 (08/12/19 0519)  SpO2: 96 % (08/12/19 0519) Vital Signs (24h Range):  Temp:  [97.9 °F (36.6 °C)-98.5 °F (36.9 °C)] 97.9 °F (36.6 °C)  Pulse:  [81-87] 81  Resp:  [18-22] 19  SpO2:  [95 %-98 %] 96 %  BP: (132-153)/(61-86) 141/63     Weight: 49.4 kg (108 lb 14.5 oz)  Body mass index is 18.12 kg/m².    Intake/Output Summary (Last 24 hours) at 8/12/2019 0840  Last data filed at 8/12/2019 0600  Gross per 24 hour   Intake 480 ml   Output 2050 ml   Net -1570 ml      Physical Exam   Constitutional: He is oriented to person, place, and time.   Frail chronically ill-appearing male in no acute distress pleasant and cooperative with examination   HENT:   Head: Normocephalic and atraumatic.   Eyes: Pupils are equal, round, and reactive to light. EOM are normal.   Neck: Normal range of motion. Neck supple. No JVD present. No thyromegaly present.   Cardiovascular: Normal rate, regular rhythm, normal heart sounds and intact distal pulses. Exam reveals no gallop and no friction rub.   No murmur heard.  Pulmonary/Chest: Effort normal and breath sounds normal.   Abdominal: Soft. Bowel sounds are normal.   Musculoskeletal: He exhibits edema.   Trace  edema of left foot.  1+ pulses palpated   Neurological: He is alert and oriented to person, place, and time.   Psychiatric: He has a normal mood and affect. His behavior is normal. Judgment and thought content normal.       Significant Labs:   CBC:   Lab 08/10/19  0530   WBC 11.43   HGB 12.4*   HCT 38.5*        CMP:   Lab 08/10/19  0530      K 3.9      CO2 26   *   BUN 7*   CREATININE 0.6   CALCIUM 9.0   ANIONGAP 7*   EGFRNONAA >60       Significant Imaging: I have reviewed and interpreted all pertinent imaging results/findings within the past 24 hours.

## 2019-08-12 NOTE — PLAN OF CARE
08/12/19 1133   Discharge Reassessment   Assessment Type Discharge Planning Reassessment   Provided patient/caregiver education on the expected discharge date and the discharge plan No   Do you have any problems affording any of your prescribed medications? No   Discharge Plan A Rehab   Discharge Plan B Other  (Outpatient Physical Therapy)   DME Needed Upon Discharge    (TBD)   Patient choice form signed by patient/caregiver No   Anticipated Discharge Disposition Rehab   Can the patient answer the patient profile reliably? Yes, cognitively intact   How does the patient rate their overall health at the present time? Fair   Describe the patient's ability to walk at the present time. Minor restrictions or changes   How often would a person be available to care for the patient? Whenever needed   Number of comorbid conditions (as recorded on the chart) Five or more   During the past month, has the patient often been bothered by feeling down, depressed or hopeless? No   During the past month, has the patient often been bothered by little interest or pleasure in doing things? No   Post-Acute Status   Post-Acute Authorization Placement  (Inpt Rehab or Outpatient Physical Therapy)   Post-Acute Placement Status Insurance Denial

## 2019-08-12 NOTE — PLAN OF CARE
Problem: Adult Inpatient Plan of Care  Goal: Plan of Care Review  Outcome: Ongoing (interventions implemented as appropriate)  Pt stable overnight. LLE neurovascular assessment remains unchanged - +1 dorsal pedal pulse, area dusky, warm, and swollen. Very tender to touch. Encouraged elevation of extremity. Exhaustive use of PRN analgesic with pt reporting little relief. VSSAF. No acute events overnight.

## 2019-08-13 LAB
POCT GLUCOSE: 117 MG/DL (ref 70–110)
POCT GLUCOSE: 125 MG/DL (ref 70–110)
POCT GLUCOSE: 158 MG/DL (ref 70–110)
POCT GLUCOSE: 62 MG/DL (ref 70–110)
POCT GLUCOSE: 71 MG/DL (ref 70–110)

## 2019-08-13 PROCEDURE — 25000003 PHARM REV CODE 250: Performed by: HOSPITALIST

## 2019-08-13 PROCEDURE — 94761 N-INVAS EAR/PLS OXIMETRY MLT: CPT

## 2019-08-13 PROCEDURE — 11000001 HC ACUTE MED/SURG PRIVATE ROOM

## 2019-08-13 PROCEDURE — 97110 THERAPEUTIC EXERCISES: CPT

## 2019-08-13 PROCEDURE — 25000003 PHARM REV CODE 250: Performed by: SURGERY

## 2019-08-13 PROCEDURE — 25000003 PHARM REV CODE 250: Performed by: EMERGENCY MEDICINE

## 2019-08-13 PROCEDURE — 63600175 PHARM REV CODE 636 W HCPCS: Performed by: HOSPITALIST

## 2019-08-13 PROCEDURE — 97535 SELF CARE MNGMENT TRAINING: CPT

## 2019-08-13 PROCEDURE — 97542 WHEELCHAIR MNGMENT TRAINING: CPT

## 2019-08-13 RX ADMIN — GABAPENTIN 300 MG: 300 CAPSULE ORAL at 08:08

## 2019-08-13 RX ADMIN — GABAPENTIN 300 MG: 300 CAPSULE ORAL at 09:08

## 2019-08-13 RX ADMIN — ASPIRIN 81 MG 81 MG: 81 TABLET ORAL at 08:08

## 2019-08-13 RX ADMIN — OXYCODONE HYDROCHLORIDE 10 MG: 5 TABLET ORAL at 09:08

## 2019-08-13 RX ADMIN — CLOPIDOGREL BISULFATE 75 MG: 75 TABLET ORAL at 08:08

## 2019-08-13 RX ADMIN — PRAVASTATIN SODIUM 40 MG: 40 TABLET ORAL at 09:08

## 2019-08-13 RX ADMIN — INSULIN DETEMIR 15 UNITS: 100 INJECTION, SOLUTION SUBCUTANEOUS at 08:08

## 2019-08-13 RX ADMIN — DOCUSATE SODIUM 100 MG: 100 CAPSULE, LIQUID FILLED ORAL at 08:08

## 2019-08-13 RX ADMIN — PANTOPRAZOLE SODIUM 40 MG: 40 TABLET, DELAYED RELEASE ORAL at 09:08

## 2019-08-13 RX ADMIN — POLYETHYLENE GLYCOL 3350 17 G: 17 POWDER, FOR SOLUTION ORAL at 08:08

## 2019-08-13 RX ADMIN — OXYCODONE HYDROCHLORIDE 10 MG: 5 TABLET ORAL at 04:08

## 2019-08-13 RX ADMIN — ENOXAPARIN SODIUM 40 MG: 100 INJECTION SUBCUTANEOUS at 04:08

## 2019-08-13 RX ADMIN — TAMSULOSIN HYDROCHLORIDE 0.4 MG: 0.4 CAPSULE ORAL at 08:08

## 2019-08-13 RX ADMIN — OXYCODONE HYDROCHLORIDE 10 MG: 5 TABLET ORAL at 01:08

## 2019-08-13 RX ADMIN — AMLODIPINE BESYLATE 10 MG: 5 TABLET ORAL at 08:08

## 2019-08-13 RX ADMIN — GABAPENTIN 300 MG: 300 CAPSULE ORAL at 03:08

## 2019-08-13 RX ADMIN — OXYCODONE HYDROCHLORIDE 10 MG: 5 TABLET ORAL at 05:08

## 2019-08-13 RX ADMIN — INSULIN ASPART 5 UNITS: 100 INJECTION, SOLUTION INTRAVENOUS; SUBCUTANEOUS at 08:08

## 2019-08-13 RX ADMIN — OXYCODONE HYDROCHLORIDE 10 MG: 5 TABLET ORAL at 10:08

## 2019-08-13 RX ADMIN — DOCUSATE SODIUM 100 MG: 100 CAPSULE, LIQUID FILLED ORAL at 09:08

## 2019-08-13 NOTE — PLAN OF CARE
Problem: Physical Therapy Goal  Goal: Physical Therapy Goal  Goals to be met by: 2019     Patient will increase functional independence with mobility by performin. Supine to sit with Modified [place]- met 8/10  2. Sit to stand transfer with Modified [place]  3. Bed to chair transfer with Modified [place]   4. Gait  x 25-50' feet with Modified [place] using Rolling Walker.   5. Wheelchair propulsion x150 feet with Modified [place] using bilateral uppper extremities  6. Lower extremity exercise program x10 reps per handout, with independence- met 19       Outcome: Ongoing (interventions implemented as appropriate)  Pt continues to make slow steady progress toward PT goals, but is unsafe to return home alone.  PT recommendation remains IPR.    If patient goes home he will need PCA assistance and a W/C with outpatient PT recommended       The patient was discharged home by ER MD and Nurse in stable condition, accompanied by parent/guardian . The patient is alert and oriented, is in no respiratory distress and has vital signs within normal limits . The patient's diagnosis, condition and treatment were explained to patient or parent/guardian. The patient/responsible party expressed understanding. prescriptions given to pt. No work/school note given to pt. A discharge plan has been developed. A  was not involved in the process. Aftercare instructions were given to the patient.

## 2019-08-13 NOTE — SUBJECTIVE & OBJECTIVE
Interval History:  Patient still has some complaints of weakness.Still has severe LLL pain.    Review of Systems   Constitutional: Positive for activity change and fatigue.   HENT: Negative for congestion and dental problem.    Eyes: Negative for discharge and itching.   Respiratory: Negative for apnea and chest tightness.    Cardiovascular: Negative for chest pain.   Gastrointestinal: Negative for abdominal distention.   Musculoskeletal: Positive for arthralgias.        Foot pain   Neurological: Positive for weakness. Negative for dizziness and facial asymmetry.   Psychiatric/Behavioral: Negative for agitation and behavioral problems.     Objective:     Vital Signs (Most Recent):  Temp: 98.2 °F (36.8 °C) (08/13/19 0731)  Pulse: 97 (08/13/19 0731)  Resp: 18 (08/13/19 0731)  BP: (!) 136/58 (08/13/19 0731)  SpO2: 96 % (08/13/19 0731) Vital Signs (24h Range):  Temp:  [97.8 °F (36.6 °C)-98.7 °F (37.1 °C)] 98.2 °F (36.8 °C)  Pulse:  [75-97] 97  Resp:  [18-19] 18  SpO2:  [93 %-97 %] 96 %  BP: (131-166)/(58-72) 136/58     Weight: 49.4 kg (108 lb 14.5 oz)  Body mass index is 18.12 kg/m².    Intake/Output Summary (Last 24 hours) at 8/13/2019 1047  Last data filed at 8/13/2019 0835  Gross per 24 hour   Intake 720 ml   Output 1200 ml   Net -480 ml      Physical Exam   Constitutional: He is oriented to person, place, and time.   Frail chronically ill-appearing male in no acute distress pleasant and cooperative with examination   HENT:   Head: Normocephalic and atraumatic.   Eyes: Pupils are equal, round, and reactive to light. EOM are normal.   Neck: Normal range of motion. Neck supple. No JVD present. No thyromegaly present.   Cardiovascular: Normal rate, regular rhythm, normal heart sounds and intact distal pulses. Exam reveals no gallop and no friction rub.   No murmur heard.  Pulmonary/Chest: Effort normal and breath sounds normal.   Abdominal: Soft. Bowel sounds are normal.   Musculoskeletal: He exhibits edema.   Trace  edema of left foot.  1+ pulses palpated   Neurological: He is alert and oriented to person, place, and time.   Psychiatric: He has a normal mood and affect. His behavior is normal. Judgment and thought content normal.       Significant Labs:   CBC:   Lab 08/10/19  0530   WBC 11.43   HGB 12.4*   HCT 38.5*        CMP:   Lab 08/10/19  0530      K 3.9      CO2 26   *   BUN 7*   CREATININE 0.6   CALCIUM 9.0   ANIONGAP 7*   EGFRNONAA >60       Significant Imaging: I have reviewed and interpreted all pertinent imaging results/findings within the past 24 hours.

## 2019-08-13 NOTE — NURSING
Asymptomatic hypoglycemia noted.CBG was 61 when rechecked CBG 71. Patient refusing dextrose 50. He states that he will be okay. Consumed his mashed potatoes and bread pudding

## 2019-08-13 NOTE — PROGRESS NOTES
[facility] Hospital Medicine  Progress Note    Patient Name: Jerry Galeana  MRN: [id number]  Patient Class: IP- Inpatient   Admission Date: 8/5/2019  Length of Stay: 8 days  Attending Physician: Mile Zavala MD  Primary Care Provider: Babak Bryan MD        Subjective:     Principal Problem:Critical lower limb ischemia        HPI:  This is a 59 y.o male patient, with a PMHx of diabetes mellitus, hypertension, and hyperlipidemia, presenting to the ED with a complaint of 10/10, sharp, chronic, left ankle and foot pain, numbness, and swelling, that began a few months ago, and worsened x4 days ago. He reports the pain at rest began yesterday. Patient states the pain is worse with movement. Patient reports he's having a surgery in x2 days on his left leg due to his atherosclerosis by , . Patient denies any fever, chills, shortness of breath, chest pain, neck pain, back pain, abdominal pain, rash, headaches, congestion, rhinorrhea, cough, sore throat, ear pain, eye pain, blurred vision, nausea, vomiting, diarrhea, dysuria, or any other associated symptoms. No prior Tx.. He reports he stopped smoking cigarettes x1 week ago. evaluated patient in ER recommending heparin drip,US arterial and X ray and plan for revascularization AM.    Overview/Hospital Course:  Mr. Galeana presented with left leg pain and swelling secondary to critical limb ischemia.  He was treated with heparin infusion and consult placed to vascular surgery.  Heparin infusion stopped prior to procedure.  Patient underwent stent placement to the left common iliac and left external iliac artery on 08/06/2019.  He was then started on aspirin and Plavix after stenting was complete, and patient had PT and OT to evaluate and treat with recommendation for rehab placement.  Patient is not a candidate for in-patient rehabilitation unit.  Patient will continue participation with therapy for a safe discharge home.  Still  has severe LLL pain.PT,OT still is recommended inpatient rehab,but insurance is denying.    Interval History:  Patient still has some complaints of weakness.Still has severe LLL pain.    Review of Systems   Constitutional: Positive for activity change and fatigue.   HENT: Negative for congestion and dental problem.    Eyes: Negative for discharge and itching.   Respiratory: Negative for apnea and chest tightness.    Cardiovascular: Negative for chest pain.   Gastrointestinal: Negative for abdominal distention.   Musculoskeletal: Positive for arthralgias.        Foot pain   Neurological: Positive for weakness. Negative for dizziness and facial asymmetry.   Psychiatric/Behavioral: Negative for agitation and behavioral problems.     Objective:     Vital Signs (Most Recent):  Temp: 98.2 °F (36.8 °C) (08/13/19 0731)  Pulse: 97 (08/13/19 0731)  Resp: 18 (08/13/19 0731)  BP: (!) 136/58 (08/13/19 0731)  SpO2: 96 % (08/13/19 0731) Vital Signs (24h Range):  Temp:  [97.8 °F (36.6 °C)-98.7 °F (37.1 °C)] 98.2 °F (36.8 °C)  Pulse:  [75-97] 97  Resp:  [18-19] 18  SpO2:  [93 %-97 %] 96 %  BP: (131-166)/(58-72) 136/58     Weight: 49.4 kg (108 lb 14.5 oz)  Body mass index is 18.12 kg/m².    Intake/Output Summary (Last 24 hours) at 8/13/2019 1047  Last data filed at 8/13/2019 0835  Gross per 24 hour   Intake 720 ml   Output 1200 ml   Net -480 ml      Physical Exam   Constitutional: He is oriented to person, place, and time.   Frail chronically ill-appearing male in no acute distress pleasant and cooperative with examination   HENT:   Head: Normocephalic and atraumatic.   Eyes: Pupils are equal, round, and reactive to light. EOM are normal.   Neck: Normal range of motion. Neck supple. No JVD present. No thyromegaly present.   Cardiovascular: Normal rate, regular rhythm, normal heart sounds and intact distal pulses. Exam reveals no gallop and no friction rub.   No murmur heard.  Pulmonary/Chest: Effort normal and breath sounds normal.    Abdominal: Soft. Bowel sounds are normal.   Musculoskeletal: He exhibits edema.   Trace edema of left foot.  1+ pulses palpated   Neurological: He is alert and oriented to person, place, and time.   Psychiatric: He has a normal mood and affect. His behavior is normal. Judgment and thought content normal.       Significant Labs:   CBC:   Lab 08/10/19  0530   WBC 11.43   HGB 12.4*   HCT 38.5*        CMP:   Lab 08/10/19  0530      K 3.9      CO2 26   *   BUN 7*   CREATININE 0.6   CALCIUM 9.0   ANIONGAP 7*   EGFRNONAA >60       Significant Imaging: I have reviewed and interpreted all pertinent imaging results/findings within the past 24 hours.      Assessment/Plan:      * Critical lower limb ischemia  Patient with severe PVD that is symtomatic  Status post treatment with heparin gtt, now stopped  S/p left common iliac and external iliac artery stent placed by vascular surgery on 08/06/2019 with improved perfusion to the extremity  Patient started on aspirin and Plavix postprocedure  He has significant pain to the left leg that persists that limits his ability to walk  Continue PT and OT with recommendation for rehab placement  Patient is not a candidate for inpatient rehab so will continue participation with therapy for a safe discharge home,  Still has severe LLL pain.PT,OT still is recommended inpatient rehab,but insurance is denying.    Tobacco abuse  Smoking cessation counseling done for > 3 minutes    Mixed hyperlipidemia  Continue atorvastatin    BPH (benign prostatic hyperplasia)  Continue Flomax      DM (diabetes mellitus) type II, controlled, with peripheral vascular disorder  Continue on SSI.  Glucose goal during hospitalization between 140 and 180  Hemoglobin A1c 6.1  Continue current  management adequate control    Essential hypertension  Continue current home medications      VTE Risk Mitigation (From admission, onward)        Ordered     enoxaparin injection 40 mg  Daily       08/12/19 0816     IP VTE HIGH RISK PATIENT  Once      08/05/19 1821                Mile Zavala MD  Department of Hospital Medicine   [facility]

## 2019-08-13 NOTE — PLAN OF CARE
Problem: Occupational Therapy Goal  Goal: Occupational Therapy Goal  Goals to be met by: 08/21/19     Patient will increase functional independence with ADLs by performing:    UE Dressing with Modified [place].  LE Dressing with Supervision.  Grooming while seated with Set-up Assistance. (Met 8/12/2019)  Toileting from bedside commode with Stand-by Assistance for hygiene and clothing management.   Sitting at edge of bed x10 minutes with Supervision. (Met 8/12/2019)  Step transfer with Stand-by Assistance  Toilet transfer to bedside commode with Stand-by Assistance.  Upper extremity exercise program x15 reps per handout, with independence. (Goal Met 8/9/2019)       Outcome: Ongoing (interventions implemented as appropriate)  Pt will continue to benefit from acute OT in order to maximize function and safety with ADLs and all aspects of functional mobility. Pt reports no change in LLE pain since admission and fears going home alone with this pain. Pt was independent PTA, walking to the store and completing ADLs/iADLs independently. Now, pt has decreased functional activity tolerance with increased LLE pain with any movement. Pt only tolerable to weight-bear for a few steps and is a fall risk. For these reasons, OT rec. Inpatient rehab at d/c. Pt will benefit from OT at next level of care if not accepted by other inpatient facilities.

## 2019-08-13 NOTE — PT/OT/SLP PROGRESS
Physical Therapy Treatment    Patient Name:  Jerry Galeana   MRN:  [id number]    Recommendations:     Discharge Recommendations:  rehabilitation facility(If home with PCA services, W/C, BSC, TTB, and HHPT)   Discharge Equipment Recommendations: wheelchair, manual, commode, tub bench   Barriers to discharge: Inaccessible home and Decreased caregiver support    Assessment:     Jerry Galeana is a 59 y.o. male admitted with a medical diagnosis of Critical lower limb ischemia.  He presents with the following impairments/functional limitations:  weakness, impaired functional mobilty, decreased safety awareness, impaired endurance, gait instability, pain, decreased coordination, impaired balance, edema, decreased lower extremity function, impaired self care skills pt making slow steady progress, but remains unsafe to return home alone.    Rehab Prognosis: Good; patient would benefit from acute skilled PT services to address these deficits and reach maximum level of function.    Recent Surgery: * No surgery found *      Plan:     During this hospitalization, patient to be seen 6 x/week to address the identified rehab impairments via gait training, therapeutic activities, therapeutic exercises, wheelchair management/training and progress toward the following goals:    · Plan of Care Expires:  08/21/19    Subjective     Chief Complaint: pain  Patient/Family Comments/goals: to be able to walk again, decreased pain  Pain/Comfort:  · Pain Rating 1: 10/10  · Location 1: (L foot/ankle)  · Pain Rating Post-Intervention 1: 10/10      Objective:     Communicated with nsg prior to session.  Patient found HOB elevated with telemetry upon PT entry to room.     General Precautions: Standard, fall, diabetic   Orthopedic Precautions:N/A   Braces: N/A     Functional Mobility:  · Bed Mobility:     · Scooting: modified independence  · Bridging: stand by assistance  · Supine to Sit: modified independence  · Sit to Supine: supervision  · Transfers:      · Bed to Chair: contact guard assistance and with VC/TC for hand placement/safety  with  no AD  using  Stand Pivot  · Gait: N/T  · Balance: Fair+ sit, Fair stand      AM-PAC 6 CLICK MOBILITY  Turning over in bed (including adjusting bedclothes, sheets and blankets)?: 4  Sitting down on and standing up from a chair with arms (e.g., wheelchair, bedside commode, etc.): 3  Moving from lying on back to sitting on the side of the bed?: 4  Moving to and from a bed to a chair (including a wheelchair)?: 3  Need to walk in hospital room?: 3  Climbing 3-5 steps with a railing?: 2  Basic Mobility Total Score: 19       Therapeutic Activities and Exercises:   T/f training as above.  Pt propelled W/C with SBA x 150' with VC for safety.  Pt required VC and SBA for maneuvers, CGA with VC for brake managemeent, and Mod A and VC for legrest management.  Pt sat at EOB and performed B FAQ's x 10 reps and then performed L TKE's and  AP's x 10 reps.  L dflx 2+/5.  Pt instructed to continue to perform There ex while in bed.  Pt verbalized/demonstrated understanding  Patient left HOB elevated with all lines intact, call button in reach, bed alarm on and nsg notified..    GOALS:   Multidisciplinary Problems     Physical Therapy Goals        Problem: Physical Therapy Goal    Goal Priority Disciplines Outcome Goal Variances Interventions   Physical Therapy Goal     PT, PT/OT Ongoing (interventions implemented as appropriate)     Description:  Goals to be met by: 2019     Patient will increase functional independence with mobility by performin. Supine to sit with Modified [place]- met 8/10  2. Sit to stand transfer with Modified [place]  3. Bed to chair transfer with Modified [place]   4. Gait  x 25-50' feet with Modified [place] using Rolling Walker.   5. Wheelchair propulsion x150 feet with Modified [place] using bilateral uppper extremities  6. Lower extremity exercise program x10 reps per handout, with  independence- met 8/12/19                        Time Tracking:     PT Received On: 08/13/19  PT Start Time: 1523     PT Stop Time: 1548  PT Total Time (min): 25 min     Billable Minutes: Therapeutic Exercise 10 and Train/Wheelchair Management 15    Treatment Type: Treatment  PT/PTA: PT     PTA Visit Number: 0     Quyen Nuñez, PT  08/13/2019

## 2019-08-13 NOTE — PT/OT/SLP PROGRESS
"Occupational Therapy   Treatment    Name: Jerry Galeana  MRN: [id number]  Admitting Diagnosis:  Critical lower limb ischemia       Recommendations:     Discharge Recommendations: rehabilitation facility(if not accepted, OT at next level of care)  Discharge Equipment Recommendations:  commode, wheelchair, manual, shower chair  Barriers to discharge:  Decreased caregiver support    Assessment:     Jerry Galeana is a 59 y.o. male with a medical diagnosis of Critical lower limb ischemia.  He presents with increased pain to LLE, impacting independence with ADLs and functional transfers. Performance deficits affecting function are weakness, gait instability, impaired endurance, impaired balance, decreased lower extremity function, impaired sensation, decreased safety awareness, impaired self care skills, pain, impaired skin, impaired functional mobilty. Pt will continue to benefit from acute OT in order to maximize function and safety with ADLs and all aspects of functional mobility. Pt reports no change in LLE pain since admission and fears going home alone with this pain. Pt was independent PTA, walking to the store and completing ADLs/iADLs independently. Now, pt has decreased functional activity tolerance with increased LLE pain with any movement. Pt only tolerable to weight-bear for a few steps with RW and is a fall risk. For these reasons, OT rec. inpatient rehab at d/c. Pt will benefit from OT at next level of care if not accepted by other inpatient facilities.      Rehab Prognosis:  Good; patient would benefit from acute skilled OT services to address these deficits and reach maximum level of function.       Plan:     Patient to be seen 5 x/week to address the above listed problems via self-care/home management, therapeutic activities, therapeutic exercises  · Plan of Care Expires: 08/21/19  · Plan of Care Reviewed with: patient    Subjective     Chief complaint: "I should've gone to [facility]. I can't go home like this" "   Patient's comments/goals: wants to reduce pain in LLE and get therapy before he goes home     Pain/Comfort:  · Pain Rating 1: 10/10  · Location - Side 1: Left  · Location - Orientation 1: generalized  · Location 1: leg  · Pain Addressed 1: Pre-medicate for activity, Reposition, Distraction, Cessation of Activity  · Pain Rating Post-Intervention 1: 10/10    Objective:     Communicated with: nurse, Erinn, prior to session.  Patient found HOB elevated with bed alarm, telemetry([employer]) upon OT entry to room.    General Precautions: Standard, fall, diabetic   Orthopedic Precautions:N/A   Braces: N/A     Occupational Performance:     Bed Mobility:    · Patient completed Rolling/Turning to Right with stand by assistance  · Patient completed Scooting/Bridging with stand by assistance  · Patient completed Supine to Sit with stand by assistance and HOB elevated  · Patient completed Sit to Supine with stand by assistance and HOB elevated     Functional Mobility/Transfers:  · Patient completed Sit <> Stand Transfer with minimum assistance  with  rolling walker   · Functional Mobility: Pt ambulated from bed>sink with MIN A using RW. Pt unable to tolerate bearing weight to LLE after 3 steps. Pt was almost to tears ambulating to the sink, unable to stand to complete grooming d/t pain. Pt was quick to ambulate with RW d/t the amount of pain upon standing. Pt edu on safety with RW in order to reduce risk of falls.     Activities of Daily Living:  · Lower Body Dressing: set-up assistance with socks        AMPA 6 Click ADL: 21    Treatment & Education:  Pt re-educated on OT role/POC.   Importance of OOB activity with staff assistance.  Safety during functional t/f and mobility   Pt edu on ADLs at home d/t denial from inpatient rehab and potential of d/c home. Discussed home safety and set-up with laundry, kitchen tasks, grooming, showers, etc.   Discussed safety and use of BSC. OT discussed rec. for shower chair in order to increase  safety/reduce risk of falls    Encouraged daily UE HEP with green theraband   All questions/concerns answered within OT scope of practice       Patient left HOB elevated with all lines intact, call button in reach and bed alarm onEducation:      GOALS:   Multidisciplinary Problems     Occupational Therapy Goals        Problem: Occupational Therapy Goal    Goal Priority Disciplines Outcome Interventions   Occupational Therapy Goal     OT, PT/OT Ongoing (interventions implemented as appropriate)    Description:  Goals to be met by: 08/21/19     Patient will increase functional independence with ADLs by performing:    UE Dressing with Modified [place].  LE Dressing with Supervision.  Grooming while seated with Set-up Assistance. (Met 8/12/2019)  Toileting from bedside commode with Stand-by Assistance for hygiene and clothing management.   Sitting at edge of bed x10 minutes with Supervision. (Met 8/12/2019)  Step transfer with Stand-by Assistance  Toilet transfer to bedside commode with Stand-by Assistance.  Upper extremity exercise program x15 reps per handout, with independence. (Goal Met 8/9/2019)                        Time Tracking:     OT Date of Treatment: 08/13/19  OT Start Time: 1350  OT Stop Time: 1406  OT Total Time (min): 16 min    Billable Minutes:Self Care/Home Management 16 min    Avelina Meza OT  8/13/2019

## 2019-08-13 NOTE — PLAN OF CARE
Problem: Adult Inpatient Plan of Care  Goal: Plan of Care Review  Outcome: Ongoing (interventions implemented as appropriate)     08/13/19 0306   Plan of Care Review   Plan of Care Reviewed With patient   No falls, trauma or injury this shift. Blood glucose monitored before each meal and at hour of sleep, coverage provided by sliding scale. No pressure injuries observed. Left foot infection managed by oral clindamycin.continue with plan of care and continue to monitor patient.

## 2019-08-13 NOTE — ASSESSMENT & PLAN NOTE
Patient with severe PVD that is symtomatic  Status post treatment with heparin gtt, now stopped  S/p left common iliac and external iliac artery stent placed by vascular surgery on 08/06/2019 with improved perfusion to the extremity  Patient started on aspirin and Plavix postprocedure  He has significant pain to the left leg that persists that limits his ability to walk  Continue PT and OT with recommendation for rehab placement  Patient is not a candidate for inpatient rehab so will continue participation with therapy for a safe discharge home,  Still has severe LLL pain.PT,OT still is recommended inpatient rehab,but insurance is denying.

## 2019-08-14 VITALS
TEMPERATURE: 99 F | RESPIRATION RATE: 17 BRPM | OXYGEN SATURATION: 97 % | SYSTOLIC BLOOD PRESSURE: 158 MMHG | DIASTOLIC BLOOD PRESSURE: 72 MMHG | BODY MASS INDEX: 18.15 KG/M2 | WEIGHT: 108.94 LBS | HEART RATE: 90 BPM | HEIGHT: 65 IN

## 2019-08-14 LAB
POCT GLUCOSE: 142 MG/DL (ref 70–110)
POCT GLUCOSE: 93 MG/DL (ref 70–110)

## 2019-08-14 PROCEDURE — 25000003 PHARM REV CODE 250: Performed by: EMERGENCY MEDICINE

## 2019-08-14 PROCEDURE — 25000003 PHARM REV CODE 250: Performed by: HOSPITALIST

## 2019-08-14 PROCEDURE — 25000003 PHARM REV CODE 250: Performed by: SURGERY

## 2019-08-14 RX ORDER — CLOPIDOGREL BISULFATE 75 MG/1
75 TABLET ORAL DAILY
Qty: 30 TABLET | Refills: 0 | Status: SHIPPED | OUTPATIENT
Start: 2019-08-14 | End: 2022-10-13 | Stop reason: SDUPTHER

## 2019-08-14 RX ORDER — OXYCODONE AND ACETAMINOPHEN 5; 325 MG/1; MG/1
1 TABLET ORAL EVERY 6 HOURS PRN
Qty: 20 TABLET | Refills: 0 | Status: SHIPPED | OUTPATIENT
Start: 2019-08-14 | End: 2019-08-21

## 2019-08-14 RX ORDER — AMLODIPINE BESYLATE 10 MG/1
10 TABLET ORAL DAILY
Qty: 30 TABLET | Refills: 0 | Status: SHIPPED | OUTPATIENT
Start: 2019-08-14 | End: 2019-09-13

## 2019-08-14 RX ORDER — NAPROXEN SODIUM 220 MG/1
81 TABLET, FILM COATED ORAL DAILY
Refills: 0
Start: 2019-08-14 | End: 2020-08-13

## 2019-08-14 RX ADMIN — CLOPIDOGREL BISULFATE 75 MG: 75 TABLET ORAL at 09:08

## 2019-08-14 RX ADMIN — PANTOPRAZOLE SODIUM 40 MG: 40 TABLET, DELAYED RELEASE ORAL at 09:08

## 2019-08-14 RX ADMIN — POLYETHYLENE GLYCOL 3350 17 G: 17 POWDER, FOR SOLUTION ORAL at 09:08

## 2019-08-14 RX ADMIN — OXYCODONE HYDROCHLORIDE 10 MG: 5 TABLET ORAL at 01:08

## 2019-08-14 RX ADMIN — ASPIRIN 81 MG 81 MG: 81 TABLET ORAL at 09:08

## 2019-08-14 RX ADMIN — INSULIN ASPART 5 UNITS: 100 INJECTION, SOLUTION INTRAVENOUS; SUBCUTANEOUS at 08:08

## 2019-08-14 RX ADMIN — GABAPENTIN 300 MG: 300 CAPSULE ORAL at 09:08

## 2019-08-14 RX ADMIN — DOCUSATE SODIUM 100 MG: 100 CAPSULE, LIQUID FILLED ORAL at 09:08

## 2019-08-14 RX ADMIN — INSULIN DETEMIR 15 UNITS: 100 INJECTION, SOLUTION SUBCUTANEOUS at 10:08

## 2019-08-14 RX ADMIN — TAMSULOSIN HYDROCHLORIDE 0.4 MG: 0.4 CAPSULE ORAL at 09:08

## 2019-08-14 RX ADMIN — AMLODIPINE BESYLATE 10 MG: 5 TABLET ORAL at 09:08

## 2019-08-14 NOTE — PLAN OF CARE
"   08/14/19 1211   Final Note   Assessment Type Final Discharge Note   Anticipated Discharge Disposition Home  ([facility])   What phone number can be called within the next 1-3 days to see how you are doing after discharge?   (Listed in chart)   Hospital Follow Up  Appt(s) scheduled? Yes   Discharge plans and expectations educations in teach back method with documentation complete? Yes   Right Care Referral Info   Post Acute Recommendation Other   Referral Type Outpatient Physical Therapy   Facility Name   ([facility])   [place]   ([facility]   [place]   ([place], [place])     EDUCATION:  TN provided with educational information on CVA.  Information reviewed and placed in :My Healthcare Packet" to be brought home for pt to use as resource after discharge.  Information included:  signs and symptoms to look for and call the doctor if experiencing, and symptoms that may indicate a medical emergency: CALL 911.  All questions answered.  Teach back method used. Patient stated, "I will call if I have any problems other than chest pain or breathing problems with the nurses".    TN informed floor nurse, Jaky, care management is complete and can proceed with discharge teaching.        "

## 2019-08-14 NOTE — PROGRESS NOTES
1378 TN contacted [facility] at [phone]; spoke with Laura to schedule pt for therapy. Communicated order was cancelled due to pt was going to have physical therapy in the home. TN stated pt's insurance fausto not cover in home PT and that was explained to pt several times. Laura made the order active again and will contact the pt with an appt.    0966 TN met with pt to discuss outpatient physical therapy; PCP f/u, and scheduling vascular surgery with [facility].     TN contacted [facility] at [phone]; left message with vascular surgery nurse to contact patient with an appt in 1-2 weeks.

## 2019-08-14 NOTE — PLAN OF CARE
Problem: Adult Inpatient Plan of Care  Goal: Plan of Care Review  Outcome: Ongoing (interventions implemented as appropriate)     08/14/19 0218   Plan of Care Review   Plan of Care Reviewed With patient   No falls, trauma or injury this shift. Blood glucose monitored before each meal and at hour of sleep,. Coverage when needed provided by the sliding scale. Patient uses walker for ambulation the bathroom continue with plan of care and continue to monitor patient.

## 2019-08-14 NOTE — NURSING
Pt discharged per MD order. IV removed. Catheter tip intact. No distress noted. Discharge instructions explained. AVS given to patient and placed in Blue Folder. Pt verbalized understanding. VSS. Afebrile. No complaints of pain, N/V, diarrhea, or SOB. Rx provided and placed in Blue Folder. Pt left with belongings to Main Entrance via wheelchair per transport. Home medical equipment was transported with patient.

## 2019-08-14 NOTE — PLAN OF CARE
Problem: Adult Inpatient Plan of Care  Goal: Plan of Care Review  Outcome: Ongoing (interventions implemented as appropriate)     08/14/19 1243   Plan of Care Review   Plan of Care Reviewed With patient   Progress improving     Pt AAOx4, medication given per MD orders, pt sitting up at bed side, ambulated to the bathroom with assistance, pt remained free from falls and injuries during shift

## 2019-08-14 NOTE — PROGRESS NOTES
Follow-up Information     Babak Bryan MD On 8/16/2019.    Specialty:  Internal Medicine  Why:  Outpatient Services PCP Follow-Up Fridasy at 12:30 PM  Contact information:  [address]  [place] [address]  [phone]             [facility].    Why:  Outpatient Services Outpatient Therapy  Contact information:  [address] [facility] [place] [address]  [phone]             [facility].    Specialties:  Surgical Oncology, Orthopedic Surgery, Genetics, Physical Medicine and Rehabilitation, Occupational Therapy, Radiology  Why:  Outpatient Services Vascular Surgery Follow-Up Clinic Nurse will contact patient wit an appointment  Contact information:  [address] [facility] [address]  [phone]             [facility]    Specialty:  DME Provider  Why:  DME, Bedside Commode; Wheelchair  Contact information:  [address]  [facility] [address]  [phone]               PLEASE BRING TO ALL FOLLOW UP APPOINTMENTS:   1) A COPY YOUR DISCHARGE INSTRUCTIONS   2) ALL MEDICINES YOU ARE CURRENTLY TAKING IN THEIR ORIGINAL BOTTLES   3) IDENTIFICATION CARD   4) INSURANCE CARD    **PLEASE ARRIVE 15 MINUTES AHEAD OF SCHEDULED APPOINTMENT TIME   ++PLEASE CALL 24 HOURS IN ADVANCE IF YOU MUST RESCHEDULE YOUR APPOINTMENT DAY AND/OR TIME     [facility] CARE MANAGEMENT WRITTEN DISCHARGE INFORMATION    APPOINTMENTS AND RESOURCES TO HELP YOU MANAGE YOUR CARE AT HOME BASED ON YOUR PREFERENCES:  (If an appointment is not scheduled for you when you leave the hospital, call your doctor to schedule a follow up visit within a week)        Healthy Living Instructions to HELP MANAGE YOUR CARE AT HOME:  Things You are responsible for:  1.    Getting your prescriptions filled   2.    Taking your medications as directed, DO NOT MISS ANY DOSES!  3.    Following the diet and exercise recommended by your doctor  4.    Going to your follow-up doctor appointment. This is  important because it allows the doctor to monitor your progress and determine if any changes need to made to your treatment plan.  5. If you have any questions about MANAGING YOUR CARE AT HOME Call the Nurse Care Line for 24/7 Assistance [phone]       Please answer any calls you may receive from [facility]. We want to continue to support you as you manage your healthcare needs. [facility] is happy to have the opportunity to serve you.      Thank you for choosing [facility] for your healthcare needs!  Your [facility] Case Management Team,    Danielle Arce, RN [place]  Registered Nurse Transition Navigator  [phone]

## 2019-08-14 NOTE — PLAN OF CARE
08/14/19 1210   Post-Acute Status   Post-Acute Authorization Placement  (Home)   Post-Acute Placement Status Set-up Complete

## 2019-08-14 NOTE — UM SECONDARY REVIEW
VP Medical Affairs    IP Extended Stay > 10     Pt will dc home today     LOS: approved an agreement with D/C plan   Discussed during LLOS

## 2019-08-14 NOTE — DISCHARGE SUMMARY
[facility] Hospital Medicine  Discharge Summary      Patient Name: Jerry Galeana  MRN: [id number]  Admission Date: 8/5/2019  Hospital Length of Stay: 9 days  Discharge Date and Time:  08/14/2019 7:36 AM  Attending Physician: Mile Zavala MD   Discharging Provider: Mile Zavala MD  Primary Care Provider: Babak Bryan MD      HPI:   This is a 59 y.o male patient, with a PMHx of diabetes mellitus, hypertension, and hyperlipidemia, presenting to the ED with a complaint of 10/10, sharp, chronic, left ankle and foot pain, numbness, and swelling, that began a few months ago, and worsened x4 days ago. He reports the pain at rest began yesterday. Patient states the pain is worse with movement. Patient reports he's having a surgery in x2 days on his left leg due to his atherosclerosis by , . Patient denies any fever, chills, shortness of breath, chest pain, neck pain, back pain, abdominal pain, rash, headaches, congestion, rhinorrhea, cough, sore throat, ear pain, eye pain, blurred vision, nausea, vomiting, diarrhea, dysuria, or any other associated symptoms. No prior Tx.. He reports he stopped smoking cigarettes x1 week ago. evaluated patient in ER recommending heparin drip,US arterial and X ray and plan for revascularization AM.    * No surgery found *      Hospital Course:   Mr. Galeana presented with left leg pain and swelling secondary to critical limb ischemia.  He was treated with heparin infusion and consult placed to vascular surgery.  Heparin infusion stopped prior to procedure.  Patient underwent stent placement to the left common iliac and left external iliac artery on 08/06/2019.  He was then started on aspirin and Plavix after stenting was complete, and patient had PT and OT to evaluate and treat with recommendation for rehab placement.  Patient is not a candidate for in-patient rehabilitation unit.medicaid rejected inpatient rehab placement,  Patient  continued with PT,OT with some improvement,  Still has severe LLL pain.PT,OT still is recommended inpatient rehab,but insurance is denying.his pain and condition is improved,patient has jose david discharged home with recommendation taking help from family,out patient PT,OT,DME,ASA,plavix is prescribed,patient also will follow up with PCP and vascular as out patient.     Consults:   Consults (From admission, onward)        Status Ordering Provider     Inpatient consult to Social Work  Once     Provider:  (Not yet assigned)    Ordered SELIN MYLES     Inpatient consult to Social Work/Case Management  Once     Provider:  (Not yet assigned)    Completed LEXIE MARSH     Inpatient consult to Vascular Surgery  Once     Provider:  Cj Pyle MD    Completed MIGUELANGEL TORRES          No new Assessment & Plan notes have been filed under this hospital service since the last note was generated.  Service: Hospital Medicine    Final Active Diagnoses:    Diagnosis Date Noted POA    PRINCIPAL PROBLEM:  Critical lower limb ischemia [I99.8] 07/02/2019 Yes    Essential hypertension [I10] 08/05/2019 Yes    DM (diabetes mellitus) type II, controlled, with peripheral vascular disorder [E11.51] 08/05/2019 Yes    BPH (benign prostatic hyperplasia) [N40.0] 08/05/2019 Yes    Mixed hyperlipidemia [E78.2] 08/05/2019 Yes    Tobacco abuse [Z72.0] 08/05/2019 Yes      Problems Resolved During this Admission:       Discharged Condition: stable    Disposition: Home or Self Care    Follow Up:  Follow-up Information     Babak Bryan MD On 8/16/2019.    Specialty:  Internal Medicine  Why:  Outpatient Services PCP Follow-Up Frida at 12:30 PM  Contact information:  [address] [place], [place] [address] [address] [phone]             vascular surgery  In 1 week.               Patient Instructions:      WHEELCHAIR FOR HOME USE     Order Specific Question Answer Comments   Hours in W/C per day: 8    Type of Wheelchair: Standard   "  Size(Width): 18"(STD adult)    Leg Support: STD footrests    Lap Belt: Velcro    Cushion: Basic    Height: 5' 5" (1.651 m)    Weight: 49.4 kg (108 lb 14.5 oz)    Does patient have medical equipment at home? cane, straight    Length of need (1-99 months): 99    Please check all that apply: Caregiver is capable and willing to operate wheelchair safely.    Please check all that apply: Patient's upper body strength is sufficient for propulsion.      COMMODE FOR HOME USE     Order Specific Question Answer Comments   Type: Standard    Height: 5' 5" (1.651 m)    Weight: 49.4 kg (108 lb 14.5 oz)    Does patient have medical equipment at home? cane, straight    Length of need (1-99 months): 99      BATH/SHOWER CHAIR FOR HOME USE     Order Specific Question Answer Comments   Height: 5' 5" (1.651 m)    Weight: 49.4 kg (108 lb 14.5 oz)    Does patient have medical equipment at home? cane, straight    Length of need (1-99 months): 99    Type: With back      Ambulatory Referral to Physical/Occupational Therapy   Referral Priority: Routine Referral Type: Physical Medicine   Referral Reason: Specialty Services Required   Number of Visits Requested: 1     Activity as tolerated       Significant Diagnostic Studies: Labs: BMP: No results for input(s): GLU, NA, K, CL, CO2, BUN, CREATININE, CALCIUM, MG in the last 48 hours. and CBC No results for input(s): WBC, HGB, HCT, PLT in the last 48 hours.  Radiology: X-Ray: CXR: X-Ray Chest 1 View (CXR): No results found for this visit on 08/05/19. and X-Ray Chest PA and Lateral (CXR): No results found for this visit on 08/05/19.  Ultrasound: legs   Angiography   Pending Diagnostic Studies:     None         Medications:  Reconciled Home Medications:      Medication List      START taking these medications    aspirin 81 MG Chew  Take 1 tablet (81 mg total) by mouth once daily.     clopidogrel 75 mg tablet  Commonly known as:  PLAVIX  Take 1 tablet (75 mg total) by mouth once daily.   "   oxyCODONE-acetaminophen 5-325 mg per tablet  Commonly known as:  PERCOCET  Take 1 tablet by mouth every 6 (six) hours as needed for Pain.        CHANGE how you take these medications    amLODIPine 10 MG tablet  Commonly known as:  NORVASC  Take 1 tablet (10 mg total) by mouth once daily.  What changed:    · medication strength  · how much to take        CONTINUE taking these medications    FLOMAX 0.4 mg Cap  Generic drug:  tamsulosin  Take 0.4 mg by mouth once daily.     gabapentin 300 MG capsule  Commonly known as:  NEURONTIN  Take 300 mg by mouth.     latanoprost 0.005 % Dpem  Apply to eye.     metFORMIN 500 MG tablet  Commonly known as:  GLUCOPHAGE  Take 1,000 mg by mouth 2 (two) times daily with meals.     pravastatin 40 MG tablet  Commonly known as:  PRAVACHOL  Take 40 mg by mouth once daily.     TRULICITY 0.75 mg/0.5 mL Pnij  Generic drug:  dulaglutide  Inject 0.75 mg into the skin every 7 days.     VITAMIN D2 50,000 unit Cap  Generic drug:  ergocalciferol  Take 50,000 Units by mouth every 7 days.            Indwelling Lines/Drains at time of discharge:   Lines/Drains/Airways          None          Time spent on the discharge of patient: over 30  minutes  Patient was seen and examined on the date of discharge and determined to be suitable for discharge.         Mile Zavala MD  Department of Hospital Medicine  [facility]

## 2019-08-16 ENCOUNTER — NURSE TRIAGE (OUTPATIENT)
Dept: ADMINISTRATIVE | Facility: CLINIC | Age: 59
End: 2019-08-16

## 2019-08-16 ENCOUNTER — TELEPHONE (OUTPATIENT)
Dept: VASCULAR SURGERY | Facility: CLINIC | Age: 59
End: 2019-08-16

## 2019-08-16 NOTE — TELEPHONE ENCOUNTER
----- Message from Dania Vo sent at 8/16/2019  2:27 PM CDT -----  Contact: Self   Type:  Patient Returning Call    Who Called: Self     Who Left Message for Patient:  Stephani     Does the patient know what this is regarding?: yes    Would the patient rather a call back or a response via [facility]?  Call     Best Call Back Number:[phone]      1448 Call to patient and asked him if he has gotten his prescription filled and he stated yes. Asked patient if he had taken the medication and he stated yes. Asked him if this helped his pain and he stated a little. He did confirm that he was taking his ASA and plavix daily. Asked patient if he was elevating his leg and he asked what did I mean. Explained when laying down to put his legs on some pillows so it will be higher. He stated yes he will do that. Explained that if over the weekend his pain was not controlled by the pain medication to let us know on Monday. Also stated if his pain was so bad that he could not tolerate it that he could go to the ER. He stated understanding.

## 2019-08-16 NOTE — TELEPHONE ENCOUNTER
Call to patient who was at a doctor appointment. Explained would call the patient back in about 30-40 min. He stated understanding.

## 2019-08-16 NOTE — TELEPHONE ENCOUNTER
Reason for Disposition   Followed an ankle or foot injury   Wound looks infected   Black (necrotic) or blisters develop in wound    Additional Information   Negative: Looks like a broken bone or dislocated joint (e.g., crooked or deformed)   Negative: Sounds like a life-threatening emergency to the triager   Negative: Followed a hip injury   Negative: Followed a knee injury   Negative: Major bleeding (actively dripping or spurting) that can't be stopped   Negative: Amputation or bone sticking through the skin   Negative: Looks like a dislocated joint (crooked or deformed)   Negative: Sounds like a life-threatening emergency to the triager   Negative: Stitches and not infected   Negative: Bright red, wide-spread, sunburn-like rash    Protocols used: LEG PAIN-A-OH, ANKLE AND FOOT INJURY-A-OH, WOUND INFECTION-A-OH

## 2019-08-27 ENCOUNTER — TELEPHONE (OUTPATIENT)
Dept: VASCULAR SURGERY | Facility: CLINIC | Age: 59
End: 2019-08-27

## 2019-08-27 ENCOUNTER — CLINICAL SUPPORT (OUTPATIENT)
Dept: REHABILITATION | Facility: HOSPITAL | Age: 59
End: 2019-08-27
Attending: HOSPITALIST
Payer: MEDICAID

## 2019-08-27 DIAGNOSIS — R26.9 GAIT ABNORMALITY: ICD-10-CM

## 2019-08-27 DIAGNOSIS — R29.898 WEAKNESS OF BOTH LOWER EXTREMITIES: ICD-10-CM

## 2019-08-27 DIAGNOSIS — Z74.09 DECREASED FUNCTIONAL MOBILITY AND ENDURANCE: ICD-10-CM

## 2019-08-27 DIAGNOSIS — M79.605 PAIN OF LEFT LOWER EXTREMITY: ICD-10-CM

## 2019-08-27 PROCEDURE — 97110 THERAPEUTIC EXERCISES: CPT | Mod: PN

## 2019-08-27 PROCEDURE — 97163 PT EVAL HIGH COMPLEX 45 MIN: CPT | Mod: PN

## 2019-08-27 RX ORDER — OXYCODONE AND ACETAMINOPHEN 5; 325 MG/1; MG/1
1 TABLET ORAL EVERY 6 HOURS PRN
Qty: 15 TABLET | Refills: 0 | Status: SHIPPED | OUTPATIENT
Start: 2019-08-27 | End: 2019-09-10

## 2019-08-27 NOTE — PLAN OF CARE
[facility]   PATIENT EVALUATION    Name: Jerry Galeana  Clinic Number: [id number]    Diagnosis:   Encounter Diagnoses   Name Primary?    Pain of left lower extremity     Weakness of both lower extremities     Gait abnormality     Decreased functional mobility and endurance      Physician: Mile Zavala,*  Treatment Orders: PT Eval and Treat    History     Past Medical History:   Diagnosis Date    Cigarette smoker     Diabetes mellitus     History of alcohol abuse     Hyperlipidemia     Hypertension     PVD (peripheral vascular disease)     Seizures      Current Outpatient Medications   Medication Sig    amLODIPine (NORVASC) 10 MG tablet Take 1 tablet (10 mg total) by mouth once daily.    aspirin 81 MG Chew Take 1 tablet (81 mg total) by mouth once daily.    clopidogrel (PLAVIX) 75 mg tablet Take 1 tablet (75 mg total) by mouth once daily.    dulaglutide (TRULICITY) 0.75 mg/0.5 mL PnIj Inject 0.75 mg into the skin every 7 days.    ergocalciferol (VITAMIN D2) 50,000 unit Cap Take 50,000 Units by mouth every 7 days.    gabapentin (NEURONTIN) 300 MG capsule Take 300 mg by mouth.    latanoprost 0.005 % dpem Apply to eye.    metFORMIN (GLUCOPHAGE) 500 MG tablet Take 1,000 mg by mouth 2 (two) times daily with meals.     pravastatin (PRAVACHOL) 40 MG tablet Take 40 mg by mouth once daily.    tamsulosin (FLOMAX) 0.4 mg Cap Take 0.4 mg by mouth once daily.     No current facility-administered medications for this visit.      Review of patient's allergies indicates:  No Known Allergies    Precautions: standard, PVD    Evaluation Date: 8/27/19  Start Time: 800  Stop Time: 850  Visit # authorized: 1/20  Authorization period: 8/16/19-11/16/19  Plan of care expiration: 10/27/19  MD referral: Mile Zavala MD    Hx of present illness: Pt reports pain began about a month ago with no injury. He went to the doctor and they found a blood clot in his L leg. Pt received a vascular sx to  remove the clot on 8/6/19 and he stayed in the hospital for almost 2 weeks.    Subjective     Fusterry Galeana states her has L posterior calf pain and lateral ankle pain. He reports he got a wound while he was in the hospital and it has been bothering him ever since. The numbness in his L foot has gotten better but the wound is his main concern. Pt reports that they just kicked him out and sent him home when he wasn't even to walk. Patient reports he lives alone and has been having his friend bring him food. Patient relies on transportation to get to therapy and reports he does not have another appointment with his vascular surgeon. (Per appointments in [facility], patient does have a follow up appointment next week). Patient reports walking, standing, and weight bearing on his leg increase his pain and nothing makes it better. He is taking pain medication but it doesn't help. He used to walk 5-6 blocks with no AD and now he uses a cane.      Pain:  Location: L lateral ankle  Description: sharp  Activities Which Increase Pain: walking, standing  Activities Which Decrease Pain: nothing  Pain Scale: 10/10 now 10/10 at worst 10/10 at best    Physical Therapy Goals: decrease pain    Objective     Observation: Patient cachetic with pants falling down, wearing flip flops    Wounds: patient with a 3cm diameter wound with scabbing and redness surrounding just proximal to L lateral malleolus, no odor or oozing; another similar wound proximal to L medial malleolus; small callus forming R medial dorsal foot where flip flop falls    Posture: weight shift to RLE, increased UE reliance on cane, forward trunk lean    Gait: SPC R hand; fully extended LLE throughout gait cycle with decreased weight shit to RLE; increased UE reliance on SPC; slow chacho, decreased step length RLE    Lower Extremity Strength: (graded 1-5 out of 5)    RLE LLE   Hip flexion: 4-/5 4-/5   Hip ER 4-/5 3/5   Hip IR 4-/5 4/5   Knee extension: 4-/5 4-/5   Knee  "Flexion 4-/5 4-/5   Ankle dorsiflexion: 4/5 3-/5   Hip abduction 4-/5 4-/5   Hip extension: 4-/5 4-/5   Ankle plantarflexion: NT due to pain NT due to pain     Squat: unable  SLS: unable      Palpation: TTP lateral L ankle    Mobility: decreased L ankle DF/PF mobility due to swelling and pain      TREATMENT:  Therapeutic exercise: Jerry received therapeutic exercises to develop strength and endurance, flexibility for 8 minutes including:    Elevated APs x30  Seated gastroc stretch 2x30"  Weight shifts standing x10    Pt. Education: Pt. educational information was provided, including: role of PT, goals for PT, scheduling - pt verbalized understanding. Instructed pt. regarding: proper form/technique with all exercises. Pt demo good understanding of the education provided. Jerry demonstrated fait return demonstration of activities. Patient educated in proper footwear for a diabetic. Patient educated in daily foot/skin checks and when to notify the doctor. Patient provided with handout re-iterating information above.    · Pt has poor health literacy which may cause difficulties with learning.    Assessment   Patient is a 59 y.o. male referred to outpatient physical therapy who presents with a PT diagnosis of LLE pain/weakness and gait abnormality demonstrating joint dysfunction and functional limitation as described below. Level of complexity is high;  based on patient's past medical history including the below co-morbidities and personal factors; functional limitations, and clinical presentation directly impacting his/her plan of care. Pt demonstrates fair rehab potential. Pt will benefit from physcial therapy services in order to maximize pain free functional mobility. The following goals were discussed with the patient and patient is in agreement with them as to be addressed in the treatment plan. Pt was given a HEP consisting of APs, gastroc stretch, and weight shifts. Pt verbally understood the instructions as they " were given and demonstrated proper form and technique during therapy. Pt was advised to perform these exercises free of pain, and to stop performing them if pain occurs. Patient informed to speak with his doctor regarding the new wound. PT has messaged referring doctor regarding new wound and recommendation for consultation. Follow up soon.      History  Co-morbidities and personal factors that may impact the plan of care Examination  Body Structures and Functions, activity limitations and participation restrictions that may impact the plan of care Clinical Presentation   Decision Making/ Complexity Score   Co-morbidities:   Diabetes, PVD            Personal Factors:   Poor health literacy, sedentary lifestyle, lives alone, age, tobacco use Body Regions: BLE    Body Systems: musculoskeletal, cardiovascular, integumentary      Activity limitations: standing, walking      Participation Restrictions: exercise       unstable   high       Medical necessity is demonstrated by the following IMPAIRMENTS/PROBLEM LIST:   1) Pain limiting function   2) Gait abnormality   3) BLE weakness   4) Decreased joint mobility   5) Decreased flexibility   6) Decreased ROM    7) Decreased proprioception/balance   8) Lack of HEP   9) skin breakdown      GOALS:   Short Term Goals:  4 weeks  1. Patient will be proficient and compliant with HEP.  2. Decrease pain at worst to no greater than 8/10.   3. Patient will be able to walk >/= 1 block without an increase in symptoms.  4. Pt will be able to verbalize the importance of proper footwear and shoe checks for diabetes.    Long Term Goals: 8 weeks  1. Patient will demonstrate full L ankle ROM in order to improve functional mobility.   2. Patient will be able to tolerate SLS LLE >/= 5 seconds in order to decrease fall risk and improve gait mechanics.  3. Patient will be able to walk >/= 2 blocks without an increase in symptoms.        Plan     Pt will be treated by physical therapy 2 times a  week for 8 weeks for pt education, HEP, therapeutic exercises, neuromuscular re-education, joint/soft tissue mobilizations, modalities prn to achieve established goals. Fusum may at times be seen by a PTA as part of the Rehab Team.     I certify the need for these services furnished under this plan of treatment and while under my care  .______________________________ Physician/Referring Practitioner  Date of Signature    Katarina Ramirez, PT, DPT

## 2019-08-27 NOTE — TELEPHONE ENCOUNTER
Spoke with patient to let him know 9/5 is the only appointment we has. Patient then asked for a refill on his pain meds told patient ill spoke to Dr. Pyle and give him a call back.     ----- Message from Suzie Mosley sent at 8/27/2019 11:28 AM CDT -----  Type:  Sooner Appointment Request    Patient is requesting a sooner appointment.  Patient declined first available appointment listed as well as another facility and provider .  Patient will not accept being placed on the waitlist and is requesting a message be sent to doctor.    Name of Caller: pt     When is the first available appointment?  09/05    Symptoms: pt asking for a sooner appt than 09/05. He states she does not think he can wait until next week he has been having leg pain.     Would the patient rather a call back or a response via [facility]? Call back     Best Call Back Number:  [phone]    Additional Information:

## 2019-08-27 NOTE — PROGRESS NOTES
[facility]   PATIENT EVALUATION    Name: Jerry Galeana  Clinic Number: [id number]    Diagnosis:   Encounter Diagnoses   Name Primary?    Pain of left lower extremity     Weakness of both lower extremities     Gait abnormality     Decreased functional mobility and endurance      Physician: Mile Zavala,*  Treatment Orders: PT Eval and Treat    History     Past Medical History:   Diagnosis Date    Cigarette smoker     Diabetes mellitus     History of alcohol abuse     Hyperlipidemia     Hypertension     PVD (peripheral vascular disease)     Seizures      Current Outpatient Medications   Medication Sig    amLODIPine (NORVASC) 10 MG tablet Take 1 tablet (10 mg total) by mouth once daily.    aspirin 81 MG Chew Take 1 tablet (81 mg total) by mouth once daily.    clopidogrel (PLAVIX) 75 mg tablet Take 1 tablet (75 mg total) by mouth once daily.    dulaglutide (TRULICITY) 0.75 mg/0.5 mL PnIj Inject 0.75 mg into the skin every 7 days.    ergocalciferol (VITAMIN D2) 50,000 unit Cap Take 50,000 Units by mouth every 7 days.    gabapentin (NEURONTIN) 300 MG capsule Take 300 mg by mouth.    latanoprost 0.005 % dpem Apply to eye.    metFORMIN (GLUCOPHAGE) 500 MG tablet Take 1,000 mg by mouth 2 (two) times daily with meals.     pravastatin (PRAVACHOL) 40 MG tablet Take 40 mg by mouth once daily.    tamsulosin (FLOMAX) 0.4 mg Cap Take 0.4 mg by mouth once daily.     No current facility-administered medications for this visit.      Review of patient's allergies indicates:  No Known Allergies    Precautions: standard, PVD    Evaluation Date: 8/27/19  Start Time: 800  Stop Time: 850  Visit # authorized: 1/20  Authorization period: 8/16/19-11/16/19  Plan of care expiration: 10/27/19  MD referral: Mile Zavala MD    Hx of present illness: Pt reports pain began about a month ago with no injury. He went to the doctor and they found a blood clot in his L leg. Pt received a vascular sx to  remove the clot on 8/6/19 and he stayed in the hospital for almost 2 weeks.    Subjective     Fusterry Galeana states her has L posterior calf pain and lateral ankle pain. He reports he got a wound while he was in the hospital and it has been bothering him ever since. The numbness in his L foot has gotten better but the wound is his main concern. Pt reports that they just kicked him out and sent him home when he wasn't even to walk. Patient reports he lives alone and has been having his friend bring him food. Patient relies on transportation to get to therapy and reports he does not have another appointment with his vascular surgeon. (Per appointments in [facility], patient does have a follow up appointment next week). Patient reports walking, standing, and weight bearing on his leg increase his pain and nothing makes it better. He is taking pain medication but it doesn't help. He used to walk 5-6 blocks with no AD and now he uses a cane.      Pain:  Location: L lateral ankle  Description: sharp  Activities Which Increase Pain: walking, standing  Activities Which Decrease Pain: nothing  Pain Scale: 10/10 now 10/10 at worst 10/10 at best    Physical Therapy Goals: decrease pain    Objective     Observation: Patient cachetic with pants falling down, wearing flip flops    Wounds: patient with a 3cm diameter wound with scabbing and redness surrounding just proximal to L lateral malleolus, no odor or oozing; another similar wound proximal to L medial malleolus; small callus forming R medial dorsal foot where flip flop falls    Posture: weight shift to RLE, increased UE reliance on cane, forward trunk lean    Gait: SPC R hand; fully extended LLE throughout gait cycle with decreased weight shit to RLE; increased UE reliance on SPC; slow chacho, decreased step length RLE    Lower Extremity Strength: (graded 1-5 out of 5)    RLE LLE   Hip flexion: 4-/5 4-/5   Hip ER 4-/5 3/5   Hip IR 4-/5 4/5   Knee extension: 4-/5 4-/5   Knee  "Flexion 4-/5 4-/5   Ankle dorsiflexion: 4/5 3-/5   Hip abduction 4-/5 4-/5   Hip extension: 4-/5 4-/5   Ankle plantarflexion: NT due to pain NT due to pain     Squat: unable  SLS: unable      Palpation: TTP lateral L ankle    Mobility: decreased L ankle DF/PF mobility due to swelling and pain      TREATMENT:  Therapeutic exercise: Jerry received therapeutic exercises to develop strength and endurance, flexibility for 8 minutes including:    Elevated APs x30  Seated gastroc stretch 2x30"  Weight shifts standing x10    Pt. Education: Pt. educational information was provided, including: role of PT, goals for PT, scheduling - pt verbalized understanding. Instructed pt. regarding: proper form/technique with all exercises. Pt demo good understanding of the education provided. Jerry demonstrated fait return demonstration of activities. Patient educated in proper footwear for a diabetic. Patient educated in daily foot/skin checks and when to notify the doctor. Patient provided with handout re-iterating information above.    · Pt has poor health literacy which may cause difficulties with learning.    Assessment   Patient is a 59 y.o. male referred to outpatient physical therapy who presents with a PT diagnosis of LLE pain/weakness and gait abnormality demonstrating joint dysfunction and functional limitation as described below. Level of complexity is high;  based on patient's past medical history including the below co-morbidities and personal factors; functional limitations, and clinical presentation directly impacting his/her plan of care. Pt demonstrates fair rehab potential. Pt will benefit from physcial therapy services in order to maximize pain free functional mobility. The following goals were discussed with the patient and patient is in agreement with them as to be addressed in the treatment plan. Pt was given a HEP consisting of APs, gastroc stretch, and weight shifts. Pt verbally understood the instructions as they " were given and demonstrated proper form and technique during therapy. Pt was advised to perform these exercises free of pain, and to stop performing them if pain occurs. Patient informed to speak with his doctor regarding the new wound. PT has messaged referring doctor regarding new wound and recommendation for consultation. Follow up soon.      History  Co-morbidities and personal factors that may impact the plan of care Examination  Body Structures and Functions, activity limitations and participation restrictions that may impact the plan of care Clinical Presentation   Decision Making/ Complexity Score   Co-morbidities:   Diabetes, PVD            Personal Factors:   Poor health literacy, sedentary lifestyle, lives alone, age, tobacco use Body Regions: BLE    Body Systems: musculoskeletal, cardiovascular, integumentary      Activity limitations: standing, walking      Participation Restrictions: exercise       unstable   high       Medical necessity is demonstrated by the following IMPAIRMENTS/PROBLEM LIST:   1) Pain limiting function   2) Gait abnormality   3) BLE weakness   4) Decreased joint mobility   5) Decreased flexibility   6) Decreased ROM    7) Decreased proprioception/balance   8) Lack of HEP   9) skin breakdown      GOALS:   Short Term Goals:  4 weeks  1. Patient will be proficient and compliant with HEP.  2. Decrease pain at worst to no greater than 8/10.   3. Patient will be able to walk >/= 1 block without an increase in symptoms.  4. Pt will be able to verbalize the importance of proper footwear and shoe checks for diabetes.    Long Term Goals: 8 weeks  1. Patient will demonstrate full L ankle ROM in order to improve functional mobility.   2. Patient will be able to tolerate SLS LLE >/= 5 seconds in order to decrease fall risk and improve gait mechanics.  3. Patient will be able to walk >/= 2 blocks without an increase in symptoms.        Plan     Pt will be treated by physical therapy 2 times a  week for 8 weeks for pt education, HEP, therapeutic exercises, neuromuscular re-education, joint/soft tissue mobilizations, modalities prn to achieve established goals. Fusum may at times be seen by a PTA as part of the Rehab Team.     I certify the need for these services furnished under this plan of treatment and while under my care  .______________________________ Physician/Referring Practitioner  Date of Signature    Katarina Ramirez, PT, DPT

## 2019-09-04 ENCOUNTER — TELEPHONE (OUTPATIENT)
Dept: VASCULAR SURGERY | Facility: CLINIC | Age: 59
End: 2019-09-04

## 2019-09-04 NOTE — TELEPHONE ENCOUNTER
Call to patient to explain that he did not do his ultrasounds before his appointment. Tried to get the patient to come earlier and he stated he couldn't because his transportation would not bring him. Explained that Dr. Pyle did surgery on him and he needed the ultrasounds when he returned for his appointment. He stated he went to [place] and stayed there for 4 days and they did surgery on him. He stated he was in so much pain. He stated he would bring his records when he came. Explained he would need the ultrasounds before but he stated he couldn't come. Canceled the ultrasounds and stated for patient to come to appointment for 2 to the office.

## 2019-09-27 ENCOUNTER — CLINICAL SUPPORT (OUTPATIENT)
Dept: REHABILITATION | Facility: HOSPITAL | Age: 59
End: 2019-09-27
Attending: HOSPITALIST
Payer: MEDICAID

## 2019-09-27 DIAGNOSIS — Z74.09 DECREASED FUNCTIONAL MOBILITY AND ENDURANCE: ICD-10-CM

## 2019-09-27 DIAGNOSIS — R26.9 GAIT ABNORMALITY: ICD-10-CM

## 2019-09-27 DIAGNOSIS — R29.898 WEAKNESS OF BOTH LOWER EXTREMITIES: ICD-10-CM

## 2019-09-27 DIAGNOSIS — M79.605 PAIN OF LEFT LOWER EXTREMITY: Primary | ICD-10-CM

## 2019-09-27 PROCEDURE — 97110 THERAPEUTIC EXERCISES: CPT | Mod: PN

## 2019-09-27 NOTE — PROGRESS NOTES
Physical Therapy Daily Note     Date: 09/27/2019  Name: Jerry Galeana  [facility] Number: [id number]  Diagnosis:   Encounter Diagnoses   Name Primary?    Pain of left lower extremity Yes    Weakness of both lower extremities     Gait abnormality     Decreased functional mobility and endurance      Physician: Mile Zavala,*    Precautions: standard, PVD    Evaluation Date: 8/27/19 PN DUE 10/27/19  Start Time: 801  Stop Time: 855  Visit # authorized: 2/20  Authorization period: 8/16/19-11/16/19  Plan of care expiration: 10/27/19  MD referral: Mile Zavala MD    Hx of present illness: Pt reports pain began about a month ago with no injury. He went to the doctor and they found a blood clot in his L leg. Pt received a vascular sx to remove the clot on 8/6/19 and he stayed in the hospital for almost 2 weeks.      Subjective     Pt reports he is doing much better. He went back to the hospital and spent 4 days there. The doctors have been really helpful in healing his wound. He was dropped off here early this mornig and walked the furthe[place] he has walked [place]. He reports no pain pre-tx because he took a pain pill. Pt reports he still has n/t distal L foot.    Objective     Re-assessment/Progress Note 9/27/19    Wounds: patient with a 3cm diameter wound, healing, decreased swelling    Gait: no AD, decreased heel strike, unsteady, decreased weight shift to LLE    Lower Extremity Strength: (graded 1-5 out of 5)    RLE LLE   Hip flexion: 4+/5 4/5   Hip ER 4+/5 4/5   Hip IR 4+/5 4/5   Knee extension: 4+/5 4-/5   Knee Flexion 4/5 4-/5   Ankle dorsiflexion: 4+/5 4-/5   Hip abduction 4+ 4-/5   Hip extension: 3+/5 3+/5   Ankle plantarflexion: 4/5 3/5       Patient received individual therapy to increase strength, endurance, ROM, flexibility, posture and core stabilization with activities as follows:     Jerry received therapeutic exercises  "to develop strength, endurance, ROM, flexibility, posture and core stabilization for 54 minutes including:     Bridges 2x10  Clamshells 2x10  SLR 2x10 (alternating)  hooklying Ball squeeze 20x5"  Standing calf raises 2x10  Calf stretch on slant board level 2 x1 min knees straight and bent  Standing hip abduction and extension 2x10  Tandem stance 2x30" ea  Step ups 4" step  Marches in place, high knees 2x10 min A to prevent fall  LAQ 1# x20 ea  TM walking x2 min .9 mph  Nustep next visit      Written Home Exercises Provided:  No new exercises provided this sesssion  Pt demo good understanding of the education provided. Fusum demonstrated good return demonstration of activities.     Education provided: importance of HEP  Fusum verbalized good understanding of education provided.   No spiritual or educational barriers to learning identified.    Assessment     Pt tolerated tx well. Added various exercises to improve strength and ROM. Patient with markedly improved strength, weight bearing tolerance, and gait mechanics today due to decreased pain and improved wound healing LLE. Pt no longer requires use of cane. Patient will continue to benefit from skilled PT in order to decrease pain, increase strength/ROM/endurance/balance, and improve functional mobility.    GOALS:   Short Term Goals:  4 weeks  1. Patient will be proficient and compliant with HEP. *in progress  2. Decrease pain at worst to no greater than 8/10. *in progress  3. Patient will be able to walk >/= 1 block without an increase in symptoms. *in progress  4. Pt will be able to verbalize the importance of proper footwear and shoe checks for diabetes. *in progress    Long Term Goals: 8 weeks  1. Patient will demonstrate full L ankle ROM in order to improve functional mobility. *in progress  2. Patient will be able to tolerate SLS LLE >/= 5 seconds in order to decrease fall risk and improve gait mechanics. *in progress  3. Patient will be able to walk >/= 2 " blocks without an increase in symptoms. *in progress     Plan   Continue with established Plan of Care towards PT goals.      Therapist: Katarina Ramirez, PT, DPT

## 2019-10-01 ENCOUNTER — HOSPITAL ENCOUNTER (OUTPATIENT)
Dept: WOUND CARE | Facility: HOSPITAL | Age: 59
Discharge: HOME | End: 2019-10-01
Attending: FAMILY MEDICINE
Payer: MEDICAID

## 2019-10-01 DIAGNOSIS — L98.499 DIABETES MELLITUS WITH SKIN ULCER: ICD-10-CM

## 2019-10-01 DIAGNOSIS — E11.622 DIABETES MELLITUS WITH SKIN ULCER: ICD-10-CM

## 2019-10-01 PROCEDURE — 99214 OFFICE O/P EST MOD 30 MIN: CPT | Performed by: FAMILY MEDICINE

## 2019-10-01 PROCEDURE — 99204 PR OFFICE/OUTPT VISIT, NEW, LEVL IV, 45-59 MIN: ICD-10-PCS | Mod: ,,, | Performed by: FAMILY MEDICINE

## 2019-10-01 PROCEDURE — 99204 OFFICE O/P NEW MOD 45 MIN: CPT | Mod: ,,, | Performed by: FAMILY MEDICINE

## 2019-10-08 ENCOUNTER — HOSPITAL ENCOUNTER (OUTPATIENT)
Dept: WOUND CARE | Facility: HOSPITAL | Age: 59
Discharge: HOME | End: 2019-10-08
Attending: FAMILY MEDICINE
Payer: MEDICAID

## 2019-10-08 ENCOUNTER — CLINICAL SUPPORT (OUTPATIENT)
Dept: REHABILITATION | Facility: HOSPITAL | Age: 59
End: 2019-10-08
Attending: HOSPITALIST
Payer: MEDICAID

## 2019-10-08 DIAGNOSIS — R26.9 GAIT ABNORMALITY: ICD-10-CM

## 2019-10-08 DIAGNOSIS — Z74.09 DECREASED FUNCTIONAL MOBILITY AND ENDURANCE: Primary | ICD-10-CM

## 2019-10-08 DIAGNOSIS — M79.605 PAIN OF LEFT LOWER EXTREMITY: ICD-10-CM

## 2019-10-08 DIAGNOSIS — R29.898 WEAKNESS OF BOTH LOWER EXTREMITIES: ICD-10-CM

## 2019-10-08 DIAGNOSIS — E11.622 DIABETES MELLITUS WITH SKIN ULCER: Primary | ICD-10-CM

## 2019-10-08 DIAGNOSIS — L98.499 DIABETES MELLITUS WITH SKIN ULCER: Primary | ICD-10-CM

## 2019-10-08 PROCEDURE — 99214 PR OFFICE/OUTPT VISIT, EST, LEVL IV, 30-39 MIN: ICD-10-PCS | Mod: ,,, | Performed by: FAMILY MEDICINE

## 2019-10-08 PROCEDURE — 99214 OFFICE O/P EST MOD 30 MIN: CPT | Mod: ,,, | Performed by: FAMILY MEDICINE

## 2019-10-08 PROCEDURE — 97110 THERAPEUTIC EXERCISES: CPT | Mod: PN

## 2019-10-08 PROCEDURE — 99213 OFFICE O/P EST LOW 20 MIN: CPT | Performed by: FAMILY MEDICINE

## 2019-10-08 NOTE — PROGRESS NOTES
"                                                  Physical Therapy Daily Note     Date: 10/08/2019  [id number]  Diagnosis:   Encounter Diagnoses   Name Primary?    Pain of left lower extremity     Weakness of both lower extremities     Gait abnormality     Decreased functional mobility and endurance Yes     Physician: Mile Zavala,*    Precautions: standard, PVD    Evaluation Date: 8/27/19 PN DUE 10/27/19  Start Time: 1050  Stop Time: 1140  Billable Time: 30 minutes  Visit # authorized: 3/20  Authorization period: 8/16/19-11/16/19  Plan of care expiration: 10/27/19  MD referral: Mile Zavala MD    Hx of present illness: Pt reports pain began about a month ago with no injury. He went to the doctor and they found a blood clot in his L leg. Pt received a vascular sx to remove the clot on 8/6/19 and he stayed in the hospital for almost 2 weeks.      Subjective     Pt reports no pain. He returned to wound care after leaving here the other day for another dressing and he is going back today after tx.    Objective         Patient received individual therapy to increase strength, endurance, ROM, flexibility, posture and core stabilization with activities as follows:     Jerry received therapeutic exercises to develop strength, endurance, ROM, flexibility, posture and core stabilization for 50 minutes including:     Nustep x5 min  Bridges 2x10  Clamshells 2x10  SLR 2x10 (alternating)  hooklying Ball squeeze 20x5"  Standing calf raises 2x10  Calf stretch on slant board level 2 x1 min knees straight and bent  Standing hip abduction and extension 2x10  Tandem stance 2x30" ea  Step ups 4" step  Marches in place, high knees 2x10 min A to prevent fall  LAQ 1# x20 ea  SAQ 1# 3x10  S/l hip abduction 2x10  4-way ankle YTB L ankle 3x10  Prone hip extension 2x10 B  Sit to stands 2x10 18"  TM walking x2 min .9 MPH NP      Written Home Exercises Provided:  No new exercises provided " this sesssion  Pt demo good understanding of the education provided. Fusum demonstrated good return demonstration of activities.     Education provided: importance of HEP  Fusum verbalized good understanding of education provided.   No spiritual or educational barriers to learning identified.    Assessment     Pt tolerated tx well. Added various exercises to improve strength and ROM. Pt with no reports of pain throughout session. Demonstrates improved endurance with no requests for rest breaks. Pt very motivated to return to PLOF. Patient will continue to benefit from skilled PT in order to decrease pain, increase strength/ROM/endurance/balance, and improve functional mobility.    GOALS:   Short Term Goals:  4 weeks  1. Patient will be proficient and compliant with HEP. *in progress  2. Decrease pain at worst to no greater than 8/10. *in progress  3. Patient will be able to walk >/= 1 block without an increase in symptoms. *in progress  4. Pt will be able to verbalize the importance of proper footwear and shoe checks for diabetes. *in progress    Long Term Goals: 8 weeks  1. Patient will demonstrate full L ankle ROM in order to improve functional mobility. *in progress  2. Patient will be able to tolerate SLS LLE >/= 5 seconds in order to decrease fall risk and improve gait mechanics. *in progress  3. Patient will be able to walk >/= 2 blocks without an increase in symptoms. *in progress     Plan   Continue with established Plan of Care towards PT goals.      Therapist: Katarina Ramirez, PT, DPT

## 2019-10-10 ENCOUNTER — CLINICAL SUPPORT (OUTPATIENT)
Dept: REHABILITATION | Facility: HOSPITAL | Age: 59
End: 2019-10-10
Attending: HOSPITALIST
Payer: MEDICAID

## 2019-10-10 DIAGNOSIS — R29.898 WEAKNESS OF BOTH LOWER EXTREMITIES: Primary | ICD-10-CM

## 2019-10-10 DIAGNOSIS — R26.9 GAIT ABNORMALITY: ICD-10-CM

## 2019-10-10 DIAGNOSIS — Z74.09 DECREASED FUNCTIONAL MOBILITY AND ENDURANCE: ICD-10-CM

## 2019-10-10 DIAGNOSIS — M79.605 PAIN OF LEFT LOWER EXTREMITY: ICD-10-CM

## 2019-10-10 PROCEDURE — 97110 THERAPEUTIC EXERCISES: CPT | Mod: PN

## 2019-10-10 NOTE — PROGRESS NOTES
"                                                  Physical Therapy Daily Note     Date: 10/10/2019  [id number]  Diagnosis:   Encounter Diagnoses   Name Primary?    Pain of left lower extremity     Weakness of both lower extremities Yes    Gait abnormality     Decreased functional mobility and endurance      Physician: Mile Zavala,*    Precautions: standard, PVD    Evaluation Date: 8/27/19 PN DUE 10/27/19  Start Time: 905  Stop Time: 955  Billable Time: 30 minutes  Visit # authorized: 4/20  Authorization period: 8/16/19-11/16/19  Plan of care expiration: 10/27/19  MD referral: Mile Zavala MD    Hx of present illness: Pt reports pain began about a month ago with no injury. He went to the doctor and they found a blood clot in his L leg. Pt received a vascular sx to remove the clot on 8/6/19 and he stayed in the hospital for almost 2 weeks.      Subjective     Pt reports no pain pre-tx, he is doing pretty well.    Objective         Patient received individual therapy to increase strength, endurance, ROM, flexibility, posture and core stabilization with activities as follows:     Jerry received therapeutic exercises to develop strength, endurance, ROM, flexibility, posture and core stabilization for 50 minutes including:     Nustep x7 min  Bridges 2x10  Clamshells 2x10  SLR 2x10 (alternating)  hooklying Ball squeeze 20x5" NP  LAQ 3# 3x10 ea  SAQ 3# x20  S/l hip abduction 2x10  4-way ankle YTB L ankle 3x10 NP  Prone hip extension 2x10 B NP due to nausea in prone *hold all prone ex  Standing calf raises 3x10  Calf stretch on slant board level 2 x1 min knees straight and bent  Standing hip abduction and extension 3x10  Tandem stance 2x30" ea  Step ups 4" step 2x10 B  Marches in place, high knees 2x10 CGA to prevent fall  Sit to stands 2x10 18"  TM walking x2 min .9 MPH NP      Written Home Exercises Provided:  No new exercises provided this sesssion  Pt demo good " understanding of the education provided. Jerry demonstrated good return demonstration of activities.     Education provided: importance of HEP  Fusum verbalized good understanding of education provided.   No spiritual or educational barriers to learning identified.    Assessment     Pt tolerated tx well until end of session during prone hip extension; as soon as patient transitioned to prone he had reports of nausea and was gagging as if he needed to throw up. Once sitting upright, patient drank a cup of water and nausea went away within 1 minute. Patient with reports this happens occasionally when he gets onto his stomach so it is recommended to skip this exercise and all exercises in prone position during tx. Patient demonstrates improved strength and balance during session. Patient will continue to benefit from skilled PT in order to decrease pain, increase strength/ROM/endurance/balance, and improve functional mobility.    GOALS:   Short Term Goals:  4 weeks  1. Patient will be proficient and compliant with HEP. *in progress  2. Decrease pain at worst to no greater than 8/10. *in progress  3. Patient will be able to walk >/= 1 block without an increase in symptoms. *in progress  4. Pt will be able to verbalize the importance of proper footwear and shoe checks for diabetes. *in progress    Long Term Goals: 8 weeks  1. Patient will demonstrate full L ankle ROM in order to improve functional mobility. *in progress  2. Patient will be able to tolerate SLS LLE >/= 5 seconds in order to decrease fall risk and improve gait mechanics. *in progress  3. Patient will be able to walk >/= 2 blocks without an increase in symptoms. *in progress     Plan   Continue with established Plan of Care towards PT goals.      Therapist: Katarina Ramirez, PT, DPT

## 2019-10-15 ENCOUNTER — CLINICAL SUPPORT (OUTPATIENT)
Dept: REHABILITATION | Facility: HOSPITAL | Age: 59
End: 2019-10-15
Attending: HOSPITALIST
Payer: MEDICAID

## 2019-10-15 ENCOUNTER — HOSPITAL ENCOUNTER (OUTPATIENT)
Dept: WOUND CARE | Facility: HOSPITAL | Age: 59
Discharge: HOME | End: 2019-10-15
Attending: FAMILY MEDICINE
Payer: MEDICAID

## 2019-10-15 DIAGNOSIS — E11.622 DIABETES MELLITUS WITH SKIN ULCER: Primary | ICD-10-CM

## 2019-10-15 DIAGNOSIS — Z74.09 DECREASED FUNCTIONAL MOBILITY AND ENDURANCE: ICD-10-CM

## 2019-10-15 DIAGNOSIS — R26.9 GAIT ABNORMALITY: ICD-10-CM

## 2019-10-15 DIAGNOSIS — R29.898 WEAKNESS OF BOTH LOWER EXTREMITIES: ICD-10-CM

## 2019-10-15 DIAGNOSIS — M79.605 PAIN OF LEFT LOWER EXTREMITY: ICD-10-CM

## 2019-10-15 DIAGNOSIS — L98.499 DIABETES MELLITUS WITH SKIN ULCER: Primary | ICD-10-CM

## 2019-10-15 PROCEDURE — 11042 PR DEBRIDEMENT, SKIN, SUB-Q TISSUE,=<20 SQ CM: ICD-10-PCS | Mod: ,,, | Performed by: FAMILY MEDICINE

## 2019-10-15 PROCEDURE — 27201912 HC WOUND CARE DEBRIDEMENT SUPPLIES: Performed by: FAMILY MEDICINE

## 2019-10-15 PROCEDURE — 97110 THERAPEUTIC EXERCISES: CPT | Mod: PN

## 2019-10-15 PROCEDURE — 97597 DBRDMT OPN WND 1ST 20 CM/<: CPT | Performed by: FAMILY MEDICINE

## 2019-10-15 PROCEDURE — 99214 PR OFFICE/OUTPT VISIT, EST, LEVL IV, 30-39 MIN: ICD-10-PCS | Mod: 25,,, | Performed by: FAMILY MEDICINE

## 2019-10-15 PROCEDURE — 99214 OFFICE O/P EST MOD 30 MIN: CPT | Mod: 25,,, | Performed by: FAMILY MEDICINE

## 2019-10-15 PROCEDURE — 11042 DBRDMT SUBQ TIS 1ST 20SQCM/<: CPT | Mod: ,,, | Performed by: FAMILY MEDICINE

## 2019-10-15 NOTE — PROGRESS NOTES
"                                                  Physical Therapy Daily Note     Date: 10/15/2019  [id number]  Diagnosis:   Encounter Diagnoses   Name Primary?    Pain of left lower extremity     Weakness of both lower extremities     Gait abnormality     Decreased functional mobility and endurance      Physician: Mile Zavala,*    Precautions: standard, PVD    Evaluation Date: 8/27/19 PN DUE 10/27/19  Start Time: 1100  Stop Time: 1156  Billable Time: 28 minutes  Visit # authorized: 4/20  Authorization period: 8/16/19-11/16/19  Plan of care expiration: 10/27/19  MD referral: Mile Zavala MD    Hx of present illness: Pt reports pain began about a month ago with no injury. He went to the doctor and they found a blood clot in his L leg. Pt received a vascular sx to remove the clot on 8/6/19 and he stayed in the hospital for almost 2 weeks.      Subjective     Pt reports no pain pre-tx, he is doing pretty well. He is supposed to go to wound care right after this but isn't sure if he will be able to because his transportation didn't have that on the list. Reports he had to take his shoes off there so he didn't wear tennis shoes today. He reports the top of his right foot bothered him a bit since yesterday when he walked 3 blocks in his flip flops.    Objective         Patient received individual therapy to increase strength, endurance, ROM, flexibility, posture and core stabilization with activities as follows:     Jerry received therapeutic exercises to develop strength, endurance, ROM, flexibility, posture and core stabilization for 56 minutes including:     Nustep x7 min NP  Bridges 3x10  Clamshells 2x10 NP  SLR 3x10  hooklying Ball squeeze 20x5"   LAQ 3# 3x10 ea  SAQ 3# x20  S/l hip abduction 2x10  4-way ankle YTB L ankle 3x10 NP  Prone hip extension 2x10 B NP due to nausea in prone *hold all prone ex  Standing calf raises 3x10  Calf stretch on slant board level " "2 x1 min knees straight and bent  Standing hip abduction and extension 3x10  Tandem stance 2x30" ea  Step ups 4" step 2x10 B  Marches in place, high knees 2x10 CGA to prevent fall NP  Sit to stands 2x10 18"  TM walking x2 min .9 MPH NP  Matrix hip abduction 2x10 #20      Written Home Exercises Provided:  No new exercises provided this sesssion  Pt demo good understanding of the education provided. Fusum demonstrated good return demonstration of activities.     Education provided: importance of HEP  Fusum verbalized good understanding of education provided.   No spiritual or educational barriers to learning identified.    Assessment     Pt tolerated tx well. Patient entered tx wearing flip flops again today. Spoke about the importance of proper footwear for therapy for safety. Pt demonstrates improved endurance with no requests for rest breaks throughout tx. Patient will continue to benefit from skilled PT in order to decrease pain, increase strength/ROM/endurance/balance, and improve functional mobility.    GOALS:   Short Term Goals:  4 weeks  1. Patient will be proficient and compliant with HEP. *in progress  2. Decrease pain at worst to no greater than 8/10. *in progress  3. Patient will be able to walk >/= 1 block without an increase in symptoms. *in progress  4. Pt will be able to verbalize the importance of proper footwear and shoe checks for diabetes. *in progress    Long Term Goals: 8 weeks  1. Patient will demonstrate full L ankle ROM in order to improve functional mobility. *in progress  2. Patient will be able to tolerate SLS LLE >/= 5 seconds in order to decrease fall risk and improve gait mechanics. *in progress  3. Patient will be able to walk >/= 2 blocks without an increase in symptoms. *in progress     Plan   Continue with established Plan of Care towards PT goals.      Therapist: Katarina Ramirez, PT, DPT      "

## 2019-10-18 ENCOUNTER — CLINICAL SUPPORT (OUTPATIENT)
Dept: REHABILITATION | Facility: HOSPITAL | Age: 59
End: 2019-10-18
Attending: HOSPITALIST
Payer: MEDICAID

## 2019-10-18 DIAGNOSIS — R26.9 GAIT ABNORMALITY: ICD-10-CM

## 2019-10-18 DIAGNOSIS — M79.605 PAIN OF LEFT LOWER EXTREMITY: ICD-10-CM

## 2019-10-18 DIAGNOSIS — R29.898 WEAKNESS OF BOTH LOWER EXTREMITIES: ICD-10-CM

## 2019-10-18 DIAGNOSIS — Z74.09 DECREASED FUNCTIONAL MOBILITY AND ENDURANCE: ICD-10-CM

## 2019-10-18 PROCEDURE — 97110 THERAPEUTIC EXERCISES: CPT | Mod: PN

## 2019-10-18 NOTE — PROGRESS NOTES
"                                                  Physical Therapy Daily Note     Date: 10/18/2019  Name: Jerry Galeana  [facility] Number: [id number]  Diagnosis:   No diagnosis found.  Physician: Mile Zavala,*        Evaluation Date: 8/27/19 PN DUE 10/27/19  Start Time: 0902  Stop Time: 1003  Total Time: 61 minutes  Billable Time: 29 minutes  Visit # authorized: 6/20  Authorization period: 8/16/19-11/16/19  Plan of care expiration: 10/27/19  MD referral: Mile Zavala MD    Hx of present illness: Pt reports pain began about a month ago with no injury. He went to the doctor and they found a blood clot in his L leg. Pt received a vascular sx to remove the clot on 8/6/19 and he stayed in the hospital for almost 2 weeks.    Precautions: standard, PVD  Subjective     Pt reports he is doing good.    Compliance with HEP, responded good to last treatment session and displays  improved duration with ambulation.    Initially pt with no complaints of pain to L foot.  However, after standing therex pt experienced ~5/10 pain.    Objective         Patient received individual therapy to increase strength, endurance, ROM, flexibility, posture and core stabilization with activities as follows:     Jerry received therapeutic exercises to develop strength, endurance, ROM, flexibility, posture and core stabilization for 61 minutes including:     Nustep x7 min   Bridges 3x10  Clamshells 2x10   SLR 3x10  hooklying Ball squeeze 20x5"   LAQ 3# 3x10 ea  SAQ 3# x20  S/l hip abduction 2x10  Standing calf raises 3x10  Calf stretch on slant board level 2 x1 min knees straight and bent  Standing hip abduction and extension 3x10  Tandem stance 2x30" ea  Step ups 4" step 2x10 B NP 2/2 pain to LLE  Marches in place, high knees 2x10 CGA to prevent fall  Sit to stands 2x10 18"  Matrix hip abduction 2x10 #20  +Cone taps no UE x20  + NBOS EC on blue foam 2x30"  + NBOS EO on blue foam 2x30"      TM walking x2 min .9 MPH NP  4-way ankle YTB L " ankle 3x10 NP  Prone hip extension 2x10 B NP due to nausea in prone *hold all prone ex    Written Home Exercises Provided:  No new exercises provided this sesssion  Pt demo good understanding of the education provided. Fusum demonstrated good return demonstration of activities.     Education provided: importance of HEP  Fusum verbalized good understanding of education provided.   No spiritual or educational barriers to learning identified.    Assessment     Pt tolerated tx well. Patient entered clinic wearing tennis shoes today. Spoke about the importance of proper footwear for therapy for safety. Pt demonstrates improved endurance with no requests for rest breaks throughout tx. Challenged with balance training with decreased coordination, ankle strategy and proprioception.  Pt with increased lateral swaying with LoB x1 with Min-Mod A for recovery. Difficulty standing on LLE when extremity is isolated to perform exercise.  Unable to perform step ups and modified SLB due to increased LLE pain.Patient will continue to benefit from skilled PT in order to decrease pain, increase strength/ROM/endurance/balance, and improve functional mobility.    GOALS:   Short Term Goals:  4 weeks  1. Patient will be proficient and compliant with HEP. *in progress  2. Decrease pain at worst to no greater than 8/10. *in progress  3. Patient will be able to walk >/= 1 block without an increase in symptoms. *in progress  4. Pt will be able to verbalize the importance of proper footwear and shoe checks for diabetes. *in progress    Long Term Goals: 8 weeks  1. Patient will demonstrate full L ankle ROM in order to improve functional mobility. *in progress  2. Patient will be able to tolerate SLS LLE >/= 5 seconds in order to decrease fall risk and improve gait mechanics. *in progress  3. Patient will be able to walk >/= 2 blocks without an increase in symptoms. *in progress     Plan   Continue with established Plan of Care towards PT  goals.      Therapist: Jo-Ann Mosley PTA, DPT

## 2019-10-23 ENCOUNTER — CLINICAL SUPPORT (OUTPATIENT)
Dept: REHABILITATION | Facility: HOSPITAL | Age: 59
End: 2019-10-23
Attending: HOSPITALIST
Payer: MEDICAID

## 2019-10-23 DIAGNOSIS — Z74.09 DECREASED FUNCTIONAL MOBILITY AND ENDURANCE: ICD-10-CM

## 2019-10-23 DIAGNOSIS — R29.898 WEAKNESS OF BOTH LOWER EXTREMITIES: ICD-10-CM

## 2019-10-23 DIAGNOSIS — R26.9 GAIT ABNORMALITY: ICD-10-CM

## 2019-10-23 DIAGNOSIS — M79.605 PAIN OF LEFT LOWER EXTREMITY: ICD-10-CM

## 2019-10-23 PROCEDURE — 97110 THERAPEUTIC EXERCISES: CPT | Mod: PN

## 2019-10-23 NOTE — PROGRESS NOTES
"                                                  Physical Therapy Daily Note     Date: 10/23/2019  Name: Jerry [facility] Number: [id number]  Diagnosis:   Encounter Diagnoses   Name Primary?    Pain of left lower extremity     Weakness of both lower extremities     Gait abnormality     Decreased functional mobility and endurance      Physician: Mile Zavala,*        Evaluation Date: 8/27/19 PN DUE 10/27/19  Start Time: 1100  Stop Time: 1153  Total Time: 53 minutes  Billable Time: 53 minutes  Visit # authorized: 7/20  Authorization period: 8/16/19-11/16/19  Plan of care expiration: 10/27/19  MD referral: Mile Zavala MD    Hx of present illness: Pt reports pain began about a month ago with no injury. He went to the doctor and they found a blood clot in his L leg. Pt received a vascular sx to remove the clot on 8/6/19 and he stayed in the hospital for almost 2 weeks.    Precautions: standard, PVD  Subjective     Pt reports therapy is going well. He has just a bit of pain over the top of his R foot due to poor circulation but nothing where his wound is. Reports he was supposed to go to the wound doctor yesterday but transportation didn't pick him up. Wound healing is going well and he saw them last week.    Objective         Patient received individual therapy to increase strength, endurance, ROM, flexibility, posture and core stabilization with activities as follows:     Jerry received therapeutic exercises to develop strength, endurance, ROM, flexibility, posture and core stabilization for 52 minutes including:     Bike x6 min   Bridges 3x10  Clamshells 2x10   SLR 3x10  hooklying Ball squeeze 20x5"   LAQ 3# 3x10 ea NP  SAQ 3# x20  S/l hip abduction 2x10  Standing calf raises 3x10  Calf stretch on slant board level 2 x1 min knees straight and bent  Standing hip abduction and extension 3x10  Tandem stance 2x30" ea  Step ups 4" step 2x10 B NP 2/2 pain to LLE  Marches in place, high knees 2x10 " "CGA to prevent fall  Sit to stands 2x10 18" NP  Matrix hip abduction 2x10 #20 NP  Cone taps no UE x20 NP   NBOS EC on blue foam 2x30"   NBOS EO on blue foam 2x30"      TM walking x2 min .9 MPH NP  4-way ankle YTB L ankle 3x10 NP  Prone hip extension 2x10 B NP due to nausea in prone *hold all prone ex    Written Home Exercises Provided:  No new exercises provided this sesssion  Pt demo good understanding of the education provided. Fusum demonstrated good return demonstration of activities.     Education provided: importance of HEP  Fusum verbalized good understanding of education provided.   No spiritual or educational barriers to learning identified.    Assessment     Pt tolerated tx well. Patient entered clinic wearing tennis shoes today. Pt demonstrates improved balance but still challenged by tandem stance. Some standing exercises held and patient requested to leave early toward end of treatment secondary to LLE pain. Pt with reports he thinks his circulation is bad, requiring seated rest breaks. Encouraged to speak with MD regarding circulation based on hx of similar symptoms and surgeries related to decreased vascularity. Patient will continue to benefit from skilled PT in order to decrease pain, increase strength/ROM/endurance/balance, and improve functional mobility.    GOALS:   Short Term Goals:  4 weeks  1. Patient will be proficient and compliant with HEP. *in progress  2. Decrease pain at worst to no greater than 8/10. *in progress  3. Patient will be able to walk >/= 1 block without an increase in symptoms. *in progress  4. Pt will be able to verbalize the importance of proper footwear and shoe checks for diabetes. *in progress    Long Term Goals: 8 weeks  1. Patient will demonstrate full L ankle ROM in order to improve functional mobility. *in progress  2. Patient will be able to tolerate SLS LLE >/= 5 seconds in order to decrease fall risk and improve gait mechanics. *in progress  3. Patient will be " able to walk >/= 2 blocks without an increase in symptoms. *in progress     Plan   Continue with established Plan of Care towards PT goals.      Therapist: Katarina Ramirez, PT, DPT

## 2019-10-24 ENCOUNTER — HOSPITAL ENCOUNTER (OUTPATIENT)
Dept: WOUND CARE | Facility: HOSPITAL | Age: 59
Discharge: HOME | End: 2019-10-24
Attending: FAMILY MEDICINE
Payer: MEDICAID

## 2019-10-24 DIAGNOSIS — I73.9 PAD (PERIPHERAL ARTERY DISEASE): ICD-10-CM

## 2019-10-24 DIAGNOSIS — L98.499 DIABETES MELLITUS WITH SKIN ULCER: Primary | ICD-10-CM

## 2019-10-24 DIAGNOSIS — E11.622 DIABETES MELLITUS WITH SKIN ULCER: Primary | ICD-10-CM

## 2019-10-24 PROCEDURE — 11042 DBRDMT SUBQ TIS 1ST 20SQCM/<: CPT | Mod: ,,, | Performed by: INTERNAL MEDICINE

## 2019-10-24 PROCEDURE — 27201912 HC WOUND CARE DEBRIDEMENT SUPPLIES: Performed by: INTERNAL MEDICINE

## 2019-10-24 PROCEDURE — 25000003 PHARM REV CODE 250

## 2019-10-24 PROCEDURE — 11042 PR DEBRIDEMENT, SKIN, SUB-Q TISSUE,=<20 SQ CM: ICD-10-PCS | Mod: ,,, | Performed by: INTERNAL MEDICINE

## 2019-10-24 PROCEDURE — 11042 DBRDMT SUBQ TIS 1ST 20SQCM/<: CPT | Performed by: INTERNAL MEDICINE

## 2019-10-24 PROCEDURE — 99214 PR OFFICE/OUTPT VISIT, EST, LEVL IV, 30-39 MIN: ICD-10-PCS | Mod: 25,,, | Performed by: INTERNAL MEDICINE

## 2019-10-24 PROCEDURE — 99214 OFFICE O/P EST MOD 30 MIN: CPT | Mod: 25,,, | Performed by: INTERNAL MEDICINE

## 2019-10-29 ENCOUNTER — CLINICAL SUPPORT (OUTPATIENT)
Dept: REHABILITATION | Facility: HOSPITAL | Age: 59
End: 2019-10-29
Attending: HOSPITALIST
Payer: MEDICAID

## 2019-10-29 ENCOUNTER — HOSPITAL ENCOUNTER (OUTPATIENT)
Dept: WOUND CARE | Facility: HOSPITAL | Age: 59
Discharge: HOME | End: 2019-10-29
Attending: FAMILY MEDICINE
Payer: MEDICAID

## 2019-10-29 DIAGNOSIS — M79.605 PAIN OF LEFT LOWER EXTREMITY: ICD-10-CM

## 2019-10-29 DIAGNOSIS — L98.499 DIABETES MELLITUS WITH SKIN ULCER: Primary | ICD-10-CM

## 2019-10-29 DIAGNOSIS — R29.898 WEAKNESS OF BOTH LOWER EXTREMITIES: ICD-10-CM

## 2019-10-29 DIAGNOSIS — E11.622 DIABETES MELLITUS WITH SKIN ULCER: Primary | ICD-10-CM

## 2019-10-29 DIAGNOSIS — R26.9 GAIT ABNORMALITY: ICD-10-CM

## 2019-10-29 DIAGNOSIS — Z74.09 DECREASED FUNCTIONAL MOBILITY AND ENDURANCE: ICD-10-CM

## 2019-10-29 PROCEDURE — 99213 OFFICE O/P EST LOW 20 MIN: CPT | Performed by: FAMILY MEDICINE

## 2019-10-29 PROCEDURE — 99214 OFFICE O/P EST MOD 30 MIN: CPT | Mod: ,,, | Performed by: FAMILY MEDICINE

## 2019-10-29 PROCEDURE — 99214 PR OFFICE/OUTPT VISIT, EST, LEVL IV, 30-39 MIN: ICD-10-PCS | Mod: ,,, | Performed by: FAMILY MEDICINE

## 2019-10-29 PROCEDURE — 97110 THERAPEUTIC EXERCISES: CPT | Mod: PN

## 2019-10-29 NOTE — PROGRESS NOTES
Physical Therapy Daily Note     Date: 10/29/2019  Name: Jerry Galeana  [facility] Number: [id number]  Diagnosis:   Encounter Diagnoses   Name Primary?    Pain of left lower extremity     Weakness of both lower extremities     Gait abnormality     Decreased functional mobility and endurance      Physician: Mile Zavala,*        Evaluation Date: 8/27/19   Start Time: 1030  Stop Time: 1130  Total Time: 60 minutes  Billable Time: 45 minutes  Visit # authorized: 8/20 *FOTO next visit  Authorization period: 8/16/19-11/16/19  Plan of care expiration: 10/27/19  MD referral: Mile Zavala MD    Hx of present illness: Pt reports pain began about a month ago with no injury. He went to the doctor and they found a blood clot in his L leg. Pt received a vascular sx to remove the clot on 8/6/19 and he stayed in the hospital for almost 2 weeks.    Precautions: standard, PVD  Subjective     Pt reports his leg is still hurting him and he thinks he is having more vascular issues. Reports he sees a vascular surgeon but he keeps calling and no one picks up. He has been leaving voicemails but no one calls back. Pt reports he can walk about a block prior to pain in LLE and his pain at worst gets to an 8/10.  Objective     Lower Extremity Strength: (graded 1-5 out of 5)    RLE LLE   Hip flexion: 4/5 4/5   Hip ER 4+/5 4/5   Hip IR 4+/5 4/5   Knee extension: 4+/5 4/5   Knee Flexion 4/5 4-/5   Ankle dorsiflexion: 4+/5 4/5   Hip abduction 4+/5 4-/5   Hip extension: 4-/5 4-/5   Ankle plantarflexion: 4/5 4/5       Patient received individual therapy to increase strength, endurance, ROM, flexibility, posture and core stabilization with activities as follows:     Jerry received therapeutic exercises to develop strength, endurance, ROM, flexibility, posture and core stabilization for 52 minutes including:     Bike x6 min   Standing calf raises 3x10  Calf stretch on slant board level 2  "x1 min knees straight and bent  Standing hip abduction and extension 2x10 YTB  Tandem stance 2x30" ea (1x on foam)   NBOS EC on blue foam 2x30"  Step ups 4" step 2x10 B (only 10 on LLE secondary to pain)  Marches in place, high knees 2x10 CGA to prevent fall on foam  Sit to stands 2x10 18"   Matrix hip abduction 2x10 #25   Cone taps no UE x20 NP  Bridges 3x10  Clamshells 2x10   SLR 3x10  hooklying Ball squeeze 20x5"   LAQ 3# 3x10 ea   SAQ 3# x20 NP  S/l hip abduction 2x10  TM walking x2 min .9 MPH NP  4-way ankle YTB L ankle 3x10 NP    Written Home Exercises Provided:  No new exercises provided this sesssion  Pt demo good understanding of the education provided. Fusum demonstrated good return demonstration of activities.     Education provided: importance of HEP  Fusum verbalized good understanding of education provided.   No spiritual or educational barriers to learning identified.    Assessment     Pt tolerated tx well. Patient entered clinic wearing tennis shoes again today. Pt with improved tolerance to standing exercises compared to last session, but still with some reports of LLE pain requiring rest breaks. Pt demonstrates improved strength but is still limited in strength, more so on the L side due to hx of pain. Pt making good progress toward goals. Patient will continue to benefit from skilled PT in order to decrease pain, increase strength/ROM/endurance/balance, and improve functional mobility.    GOALS:   Short Term Goals:  4 weeks  1. Patient will be proficient and compliant with HEP. *GOAL MET 10/29/19  2. Decrease pain at worst to no greater than 8/10. *GOAL MET 10/29/19  3. Patient will be able to walk >/= 1 block without an increase in symptoms. *GOAL MET 10/29/19  4. Pt will be able to verbalize the importance of proper footwear and shoe checks for diabetes. *GOAL MET 10/29/19    Long Term Goals: 8 weeks  1. Patient will demonstrate full L ankle ROM in order to improve functional mobility. *GOAL MET " 10/29/19  2. Patient will be able to tolerate SLS LLE >/= 5 seconds in order to decrease fall risk and improve gait mechanics. *in progress  3. Patient will be able to walk >/= 2 blocks without an increase in symptoms. *in progress     Plan   Continue with established Plan of Care towards PT goals.      Therapist: Katarina Ramirez, PT, DPT

## 2019-11-04 ENCOUNTER — DOCUMENTATION ONLY (OUTPATIENT)
Dept: REHABILITATION | Facility: HOSPITAL | Age: 59
End: 2019-11-04

## 2019-11-04 NOTE — PROGRESS NOTES
Spoke with patient regarding no show for appointment this morning. Pt reports transportation did not show up. Informed to call if this happens and try to cancel appointment. Pt reminded of next appointment.    Katarina Ramirez, PT

## 2019-11-05 ENCOUNTER — HOSPITAL ENCOUNTER (OUTPATIENT)
Dept: WOUND CARE | Facility: HOSPITAL | Age: 59
Discharge: HOME | End: 2019-11-05
Attending: FAMILY MEDICINE
Payer: MEDICAID

## 2019-11-05 DIAGNOSIS — L98.499 DIABETES MELLITUS WITH SKIN ULCER: Primary | ICD-10-CM

## 2019-11-05 DIAGNOSIS — E11.622 DIABETES MELLITUS WITH SKIN ULCER: Primary | ICD-10-CM

## 2019-11-05 PROCEDURE — 27201912 HC WOUND CARE DEBRIDEMENT SUPPLIES: Performed by: FAMILY MEDICINE

## 2019-11-05 PROCEDURE — 11042 DBRDMT SUBQ TIS 1ST 20SQCM/<: CPT | Mod: ,,, | Performed by: FAMILY MEDICINE

## 2019-11-05 PROCEDURE — 99214 OFFICE O/P EST MOD 30 MIN: CPT | Mod: 25,,, | Performed by: FAMILY MEDICINE

## 2019-11-05 PROCEDURE — 25000003 PHARM REV CODE 250

## 2019-11-05 PROCEDURE — 11042 PR DEBRIDEMENT, SKIN, SUB-Q TISSUE,=<20 SQ CM: ICD-10-PCS | Mod: ,,, | Performed by: FAMILY MEDICINE

## 2019-11-05 PROCEDURE — 99214 PR OFFICE/OUTPT VISIT, EST, LEVL IV, 30-39 MIN: ICD-10-PCS | Mod: 25,,, | Performed by: FAMILY MEDICINE

## 2019-11-05 PROCEDURE — 11042 DBRDMT SUBQ TIS 1ST 20SQCM/<: CPT | Performed by: FAMILY MEDICINE

## 2019-11-13 ENCOUNTER — DOCUMENTATION ONLY (OUTPATIENT)
Dept: REHABILITATION | Facility: HOSPITAL | Age: 59
End: 2019-11-13

## 2019-11-13 NOTE — PROGRESS NOTES
Missed Visit/Cancellation     Date: 11/13/2019      Canceled Number: 1  No Show Number: 2             Pt initially had visit scheduled today for 1030.  Pt with second NS in a row.  Speak with pt in regards to NS policy next visit.  Pt's next scheduled appointment is 11/15/19 at 1100.

## 2019-11-14 ENCOUNTER — HOSPITAL ENCOUNTER (OUTPATIENT)
Dept: WOUND CARE | Facility: HOSPITAL | Age: 59
Discharge: HOME | End: 2019-11-14
Attending: FAMILY MEDICINE
Payer: MEDICAID

## 2019-11-14 DIAGNOSIS — L98.499 DIABETES MELLITUS WITH SKIN ULCER: Primary | ICD-10-CM

## 2019-11-14 DIAGNOSIS — I73.9 PAD (PERIPHERAL ARTERY DISEASE): ICD-10-CM

## 2019-11-14 DIAGNOSIS — E11.622 DIABETES MELLITUS WITH SKIN ULCER: Primary | ICD-10-CM

## 2019-11-14 PROCEDURE — 11042 DBRDMT SUBQ TIS 1ST 20SQCM/<: CPT | Performed by: INTERNAL MEDICINE

## 2019-11-14 PROCEDURE — 99214 PR OFFICE/OUTPT VISIT, EST, LEVL IV, 30-39 MIN: ICD-10-PCS | Mod: ,,, | Performed by: INTERNAL MEDICINE

## 2019-11-14 PROCEDURE — 99213 OFFICE O/P EST LOW 20 MIN: CPT | Performed by: INTERNAL MEDICINE

## 2019-11-14 PROCEDURE — 99214 OFFICE O/P EST MOD 30 MIN: CPT | Mod: ,,, | Performed by: INTERNAL MEDICINE

## 2019-11-19 ENCOUNTER — HOSPITAL ENCOUNTER (OUTPATIENT)
Dept: WOUND CARE | Facility: HOSPITAL | Age: 59
Discharge: HOME | End: 2019-11-19
Attending: FAMILY MEDICINE
Payer: MEDICAID

## 2019-11-19 DIAGNOSIS — E11.622 DIABETES MELLITUS WITH SKIN ULCER: Primary | ICD-10-CM

## 2019-11-19 DIAGNOSIS — L98.499 DIABETES MELLITUS WITH SKIN ULCER: Primary | ICD-10-CM

## 2019-11-19 PROCEDURE — 99212 OFFICE O/P EST SF 10 MIN: CPT | Performed by: FAMILY MEDICINE

## 2019-11-19 PROCEDURE — 99214 OFFICE O/P EST MOD 30 MIN: CPT | Mod: ,,, | Performed by: FAMILY MEDICINE

## 2019-11-19 PROCEDURE — 99214 PR OFFICE/OUTPT VISIT, EST, LEVL IV, 30-39 MIN: ICD-10-PCS | Mod: ,,, | Performed by: FAMILY MEDICINE

## 2019-11-20 ENCOUNTER — DOCUMENTATION ONLY (OUTPATIENT)
Dept: REHABILITATION | Facility: HOSPITAL | Age: 59
End: 2019-11-20

## 2019-11-20 NOTE — PROGRESS NOTES
Missed Visit/Cancellation      Date: 11/20/2019        Canceled Number: 1  No Show Number: 3                                                                                                                Pt initially had visit scheduled today for 1030.  Pt with third NS in a row.  Speak with pt in regards to NS policy next visit.  Pt has no further visits at this time.

## 2019-11-26 ENCOUNTER — HOSPITAL ENCOUNTER (OUTPATIENT)
Dept: WOUND CARE | Facility: HOSPITAL | Age: 59
Discharge: HOME | End: 2019-11-26
Attending: FAMILY MEDICINE
Payer: MEDICAID

## 2019-11-26 DIAGNOSIS — L98.499 DIABETES MELLITUS WITH SKIN ULCER: Primary | ICD-10-CM

## 2019-11-26 DIAGNOSIS — E11.622 DIABETES MELLITUS WITH SKIN ULCER: Primary | ICD-10-CM

## 2019-11-26 PROCEDURE — 25000003 PHARM REV CODE 250

## 2019-11-26 PROCEDURE — 11042 DBRDMT SUBQ TIS 1ST 20SQCM/<: CPT | Performed by: FAMILY MEDICINE

## 2019-11-26 PROCEDURE — 11042 PR DEBRIDEMENT, SKIN, SUB-Q TISSUE,=<20 SQ CM: ICD-10-PCS | Mod: ,,, | Performed by: FAMILY MEDICINE

## 2019-11-26 PROCEDURE — 99214 OFFICE O/P EST MOD 30 MIN: CPT | Mod: 25,,, | Performed by: FAMILY MEDICINE

## 2019-11-26 PROCEDURE — 99214 PR OFFICE/OUTPT VISIT, EST, LEVL IV, 30-39 MIN: ICD-10-PCS | Mod: 25,,, | Performed by: FAMILY MEDICINE

## 2019-11-26 PROCEDURE — 27201912 HC WOUND CARE DEBRIDEMENT SUPPLIES: Performed by: FAMILY MEDICINE

## 2019-11-26 PROCEDURE — 11042 DBRDMT SUBQ TIS 1ST 20SQCM/<: CPT | Mod: ,,, | Performed by: FAMILY MEDICINE

## 2019-12-03 ENCOUNTER — HOSPITAL ENCOUNTER (OUTPATIENT)
Dept: RADIOLOGY | Facility: HOSPITAL | Age: 59
Discharge: HOME | End: 2019-12-03
Attending: FAMILY MEDICINE
Payer: MEDICAID

## 2019-12-03 ENCOUNTER — HOSPITAL ENCOUNTER (OUTPATIENT)
Dept: WOUND CARE | Facility: HOSPITAL | Age: 59
Discharge: HOME | End: 2019-12-03
Attending: FAMILY MEDICINE
Payer: MEDICAID

## 2019-12-03 DIAGNOSIS — E11.622 DIABETES MELLITUS WITH SKIN ULCER: Primary | ICD-10-CM

## 2019-12-03 DIAGNOSIS — L98.499 DIABETES MELLITUS WITH SKIN ULCER: Primary | ICD-10-CM

## 2019-12-03 DIAGNOSIS — L98.499 DIABETES MELLITUS WITH SKIN ULCER: ICD-10-CM

## 2019-12-03 DIAGNOSIS — E11.622 DIABETES MELLITUS WITH SKIN ULCER: ICD-10-CM

## 2019-12-03 PROCEDURE — 99214 OFFICE O/P EST MOD 30 MIN: CPT | Mod: ,,, | Performed by: FAMILY MEDICINE

## 2019-12-03 PROCEDURE — 99212 OFFICE O/P EST SF 10 MIN: CPT | Performed by: FAMILY MEDICINE

## 2019-12-03 PROCEDURE — 73610 XR ANKLE COMPLETE 3 VIEW LEFT: ICD-10-PCS | Mod: 26,LT,, | Performed by: RADIOLOGY

## 2019-12-03 PROCEDURE — 73610 X-RAY EXAM OF ANKLE: CPT | Mod: TC,FY,LT

## 2019-12-03 PROCEDURE — 73610 X-RAY EXAM OF ANKLE: CPT | Mod: 26,LT,, | Performed by: RADIOLOGY

## 2019-12-03 PROCEDURE — 99214 PR OFFICE/OUTPT VISIT, EST, LEVL IV, 30-39 MIN: ICD-10-PCS | Mod: ,,, | Performed by: FAMILY MEDICINE

## 2019-12-10 ENCOUNTER — HOSPITAL ENCOUNTER (OUTPATIENT)
Dept: WOUND CARE | Facility: HOSPITAL | Age: 59
Discharge: HOME | End: 2019-12-10
Attending: FAMILY MEDICINE
Payer: MEDICAID

## 2019-12-10 DIAGNOSIS — L98.499 DIABETES MELLITUS WITH SKIN ULCER: Primary | ICD-10-CM

## 2019-12-10 DIAGNOSIS — E11.622 DIABETES MELLITUS WITH SKIN ULCER: Primary | ICD-10-CM

## 2019-12-10 PROCEDURE — 99213 OFFICE O/P EST LOW 20 MIN: CPT | Performed by: FAMILY MEDICINE

## 2019-12-10 PROCEDURE — 99214 OFFICE O/P EST MOD 30 MIN: CPT | Mod: ,,, | Performed by: FAMILY MEDICINE

## 2019-12-10 PROCEDURE — 99214 PR OFFICE/OUTPT VISIT, EST, LEVL IV, 30-39 MIN: ICD-10-PCS | Mod: ,,, | Performed by: FAMILY MEDICINE

## 2020-01-02 ENCOUNTER — HOSPITAL ENCOUNTER (OUTPATIENT)
Dept: WOUND CARE | Facility: HOSPITAL | Age: 60
Discharge: HOME | End: 2020-01-02
Attending: INTERNAL MEDICINE
Payer: MEDICAID

## 2020-01-02 DIAGNOSIS — E11.622 DIABETES MELLITUS WITH SKIN ULCER: Primary | ICD-10-CM

## 2020-01-02 DIAGNOSIS — I73.9 PAD (PERIPHERAL ARTERY DISEASE): ICD-10-CM

## 2020-01-02 DIAGNOSIS — L98.499 DIABETES MELLITUS WITH SKIN ULCER: Primary | ICD-10-CM

## 2020-01-02 PROCEDURE — 99213 PR OFFICE/OUTPT VISIT, EST, LEVL III, 20-29 MIN: ICD-10-PCS | Mod: ,,, | Performed by: INTERNAL MEDICINE

## 2020-01-02 PROCEDURE — 99213 OFFICE O/P EST LOW 20 MIN: CPT | Performed by: INTERNAL MEDICINE

## 2020-01-02 PROCEDURE — 99213 OFFICE O/P EST LOW 20 MIN: CPT | Mod: ,,, | Performed by: INTERNAL MEDICINE

## 2020-01-09 ENCOUNTER — HOSPITAL ENCOUNTER (OUTPATIENT)
Dept: WOUND CARE | Facility: HOSPITAL | Age: 60
Discharge: HOME | End: 2020-01-09
Attending: INTERNAL MEDICINE
Payer: MEDICAID

## 2020-01-09 DIAGNOSIS — I73.9 PAD (PERIPHERAL ARTERY DISEASE): ICD-10-CM

## 2020-01-09 DIAGNOSIS — L98.499 DIABETES MELLITUS WITH SKIN ULCER: Primary | ICD-10-CM

## 2020-01-09 DIAGNOSIS — E11.622 DIABETES MELLITUS WITH SKIN ULCER: Primary | ICD-10-CM

## 2020-01-09 PROCEDURE — 11042 DBRDMT SUBQ TIS 1ST 20SQCM/<: CPT | Performed by: INTERNAL MEDICINE

## 2020-01-09 PROCEDURE — 11042 PR DEBRIDEMENT, SKIN, SUB-Q TISSUE,=<20 SQ CM: ICD-10-PCS | Mod: ,,, | Performed by: INTERNAL MEDICINE

## 2020-01-09 PROCEDURE — 99213 PR OFFICE/OUTPT VISIT, EST, LEVL III, 20-29 MIN: ICD-10-PCS | Mod: 25,,, | Performed by: INTERNAL MEDICINE

## 2020-01-09 PROCEDURE — 27201912 HC WOUND CARE DEBRIDEMENT SUPPLIES: Performed by: INTERNAL MEDICINE

## 2020-01-09 PROCEDURE — 11042 DBRDMT SUBQ TIS 1ST 20SQCM/<: CPT | Mod: ,,, | Performed by: INTERNAL MEDICINE

## 2020-01-09 PROCEDURE — 99213 OFFICE O/P EST LOW 20 MIN: CPT | Mod: 25,,, | Performed by: INTERNAL MEDICINE

## 2020-01-14 ENCOUNTER — HOSPITAL ENCOUNTER (OUTPATIENT)
Dept: WOUND CARE | Facility: HOSPITAL | Age: 60
Discharge: HOME | End: 2020-01-14
Attending: INTERNAL MEDICINE
Payer: MEDICAID

## 2020-01-14 DIAGNOSIS — E11.622 DIABETES MELLITUS WITH SKIN ULCER: Primary | ICD-10-CM

## 2020-01-14 DIAGNOSIS — L98.499 DIABETES MELLITUS WITH SKIN ULCER: Primary | ICD-10-CM

## 2020-01-14 PROCEDURE — 99214 OFFICE O/P EST MOD 30 MIN: CPT | Mod: ,,, | Performed by: FAMILY MEDICINE

## 2020-01-14 PROCEDURE — 99214 PR OFFICE/OUTPT VISIT, EST, LEVL IV, 30-39 MIN: ICD-10-PCS | Mod: ,,, | Performed by: FAMILY MEDICINE

## 2020-01-14 PROCEDURE — 99212 OFFICE O/P EST SF 10 MIN: CPT | Performed by: FAMILY MEDICINE

## 2020-01-21 ENCOUNTER — HOSPITAL ENCOUNTER (OUTPATIENT)
Dept: WOUND CARE | Facility: HOSPITAL | Age: 60
Discharge: HOME | End: 2020-01-21
Attending: FAMILY MEDICINE
Payer: MEDICAID

## 2020-01-21 DIAGNOSIS — L98.499 DIABETES MELLITUS WITH SKIN ULCER: ICD-10-CM

## 2020-01-21 DIAGNOSIS — E11.622 DIABETES MELLITUS WITH SKIN ULCER: ICD-10-CM

## 2020-01-21 DIAGNOSIS — I73.9 PAD (PERIPHERAL ARTERY DISEASE): Primary | ICD-10-CM

## 2020-01-21 PROCEDURE — 99214 PR OFFICE/OUTPT VISIT, EST, LEVL IV, 30-39 MIN: ICD-10-PCS | Mod: ,,, | Performed by: FAMILY MEDICINE

## 2020-01-21 PROCEDURE — 99213 OFFICE O/P EST LOW 20 MIN: CPT | Performed by: FAMILY MEDICINE

## 2020-01-21 PROCEDURE — 99214 OFFICE O/P EST MOD 30 MIN: CPT | Mod: ,,, | Performed by: FAMILY MEDICINE

## 2020-01-28 ENCOUNTER — HOSPITAL ENCOUNTER (OUTPATIENT)
Dept: WOUND CARE | Facility: HOSPITAL | Age: 60
Discharge: HOME | End: 2020-01-28
Attending: FAMILY MEDICINE
Payer: MEDICAID

## 2020-01-28 DIAGNOSIS — E11.622 DIABETES MELLITUS WITH SKIN ULCER: Primary | ICD-10-CM

## 2020-01-28 DIAGNOSIS — L98.499 DIABETES MELLITUS WITH SKIN ULCER: Primary | ICD-10-CM

## 2020-01-28 PROCEDURE — 99214 PR OFFICE/OUTPT VISIT, EST, LEVL IV, 30-39 MIN: ICD-10-PCS | Mod: ,,, | Performed by: FAMILY MEDICINE

## 2020-01-28 PROCEDURE — 99212 OFFICE O/P EST SF 10 MIN: CPT | Performed by: FAMILY MEDICINE

## 2020-01-28 PROCEDURE — 99214 OFFICE O/P EST MOD 30 MIN: CPT | Mod: ,,, | Performed by: FAMILY MEDICINE

## 2020-02-04 ENCOUNTER — HOSPITAL ENCOUNTER (OUTPATIENT)
Dept: WOUND CARE | Facility: HOSPITAL | Age: 60
Discharge: HOME | End: 2020-02-04
Attending: FAMILY MEDICINE
Payer: MEDICAID

## 2020-02-04 DIAGNOSIS — E11.622 DIABETES MELLITUS WITH SKIN ULCER: Primary | ICD-10-CM

## 2020-02-04 DIAGNOSIS — L98.499 DIABETES MELLITUS WITH SKIN ULCER: Primary | ICD-10-CM

## 2020-02-04 PROCEDURE — 99212 OFFICE O/P EST SF 10 MIN: CPT | Performed by: FAMILY MEDICINE

## 2020-02-04 PROCEDURE — 99214 OFFICE O/P EST MOD 30 MIN: CPT | Mod: ,,, | Performed by: FAMILY MEDICINE

## 2020-02-04 PROCEDURE — 99214 PR OFFICE/OUTPT VISIT, EST, LEVL IV, 30-39 MIN: ICD-10-PCS | Mod: ,,, | Performed by: FAMILY MEDICINE

## 2022-01-05 ENCOUNTER — TELEPHONE (OUTPATIENT)
Dept: GASTROENTEROLOGY | Facility: CLINIC | Age: 62
End: 2022-01-05
Payer: MEDICAID

## 2022-01-05 NOTE — TELEPHONE ENCOUNTER
----- Message from Jo Emerson MA sent at 1/5/2022  1:05 PM CST -----    ----- Message -----  From: Fabi Ware  Sent: 1/5/2022   1:03 PM CST  To: [facility] Staff    Type: Patient Call Back    Who called: self    What is the request in detail: patient tested positive for Covid on 1/4/2022, patient would like to reschedule his appt. Please call    Can the clinic reply by [facility]? No    Would the patient rather a call back or a response via [facility]? call    Best call back number:[phone]

## 2022-01-28 ENCOUNTER — OFFICE VISIT (OUTPATIENT)
Dept: GASTROENTEROLOGY | Facility: CLINIC | Age: 62
End: 2022-01-28
Payer: MEDICAID

## 2022-01-28 ENCOUNTER — LAB VISIT (OUTPATIENT)
Dept: LAB | Facility: HOSPITAL | Age: 62
End: 2022-01-28
Attending: STUDENT IN AN ORGANIZED HEALTH CARE EDUCATION/TRAINING PROGRAM
Payer: MEDICAID

## 2022-01-28 VITALS
HEIGHT: 65 IN | BODY MASS INDEX: 18.88 KG/M2 | DIASTOLIC BLOOD PRESSURE: 78 MMHG | HEART RATE: 85 BPM | SYSTOLIC BLOOD PRESSURE: 140 MMHG | WEIGHT: 113.31 LBS

## 2022-01-28 DIAGNOSIS — R63.4 UNINTENTIONAL WEIGHT LOSS: Primary | ICD-10-CM

## 2022-01-28 DIAGNOSIS — D64.9 ANEMIA, UNSPECIFIED TYPE: ICD-10-CM

## 2022-01-28 DIAGNOSIS — R63.4 UNINTENTIONAL WEIGHT LOSS: ICD-10-CM

## 2022-01-28 LAB
BASOPHILS # BLD AUTO: 0.1 K/UL (ref 0–0.2)
BASOPHILS NFR BLD: 0.8 % (ref 0–1.9)
DIFFERENTIAL METHOD: ABNORMAL
EOSINOPHIL # BLD AUTO: 0.2 K/UL (ref 0–0.5)
EOSINOPHIL NFR BLD: 1.2 % (ref 0–8)
ERYTHROCYTE [DISTWIDTH] IN BLOOD BY AUTOMATED COUNT: 15.5 % (ref 11.5–14.5)
FERRITIN SERPL-MCNC: 34 NG/ML (ref 20–300)
HCT VFR BLD AUTO: 49.7 % (ref 40–54)
HGB BLD-MCNC: 15.7 G/DL (ref 14–18)
IMM GRANULOCYTES # BLD AUTO: 0.12 K/UL (ref 0–0.04)
IMM GRANULOCYTES NFR BLD AUTO: 1 % (ref 0–0.5)
IRON SERPL-MCNC: 84 UG/DL (ref 45–160)
LYMPHOCYTES # BLD AUTO: 2.8 K/UL (ref 1–4.8)
LYMPHOCYTES NFR BLD: 22.7 % (ref 18–48)
MCH RBC QN AUTO: 30.7 PG (ref 27–31)
MCHC RBC AUTO-ENTMCNC: 31.6 G/DL (ref 32–36)
MCV RBC AUTO: 97 FL (ref 82–98)
MONOCYTES # BLD AUTO: 0.9 K/UL (ref 0.3–1)
MONOCYTES NFR BLD: 7.6 % (ref 4–15)
NEUTROPHILS # BLD AUTO: 8.3 K/UL (ref 1.8–7.7)
NEUTROPHILS NFR BLD: 66.7 % (ref 38–73)
NRBC BLD-RTO: 0 /100 WBC
PLATELET # BLD AUTO: 319 K/UL (ref 150–450)
PMV BLD AUTO: 10.4 FL (ref 9.2–12.9)
RBC # BLD AUTO: 5.11 M/UL (ref 4.6–6.2)
SATURATED IRON: 21 % (ref 20–50)
TOTAL IRON BINDING CAPACITY: 400 UG/DL (ref 250–450)
TRANSFERRIN SERPL-MCNC: 270 MG/DL (ref 200–375)
WBC # BLD AUTO: 12.43 K/UL (ref 3.9–12.7)

## 2022-01-28 PROCEDURE — 3077F PR MOST RECENT SYSTOLIC BLOOD PRESSURE >= 140 MM HG: ICD-10-PCS | Mod: CPTII,,, | Performed by: STUDENT IN AN ORGANIZED HEALTH CARE EDUCATION/TRAINING PROGRAM

## 2022-01-28 PROCEDURE — 3008F BODY MASS INDEX DOCD: CPT | Mod: CPTII,,, | Performed by: STUDENT IN AN ORGANIZED HEALTH CARE EDUCATION/TRAINING PROGRAM

## 2022-01-28 PROCEDURE — 85025 COMPLETE CBC W/AUTO DIFF WBC: CPT | Performed by: STUDENT IN AN ORGANIZED HEALTH CARE EDUCATION/TRAINING PROGRAM

## 2022-01-28 PROCEDURE — 3078F DIAST BP <80 MM HG: CPT | Mod: CPTII,,, | Performed by: STUDENT IN AN ORGANIZED HEALTH CARE EDUCATION/TRAINING PROGRAM

## 2022-01-28 PROCEDURE — 3077F SYST BP >= 140 MM HG: CPT | Mod: CPTII,,, | Performed by: STUDENT IN AN ORGANIZED HEALTH CARE EDUCATION/TRAINING PROGRAM

## 2022-01-28 PROCEDURE — 99999 PR PBB SHADOW E&M-EST. PATIENT-LVL IV: CPT | Mod: PBBFAC,,, | Performed by: STUDENT IN AN ORGANIZED HEALTH CARE EDUCATION/TRAINING PROGRAM

## 2022-01-28 PROCEDURE — 36415 COLL VENOUS BLD VENIPUNCTURE: CPT | Performed by: STUDENT IN AN ORGANIZED HEALTH CARE EDUCATION/TRAINING PROGRAM

## 2022-01-28 PROCEDURE — 99214 OFFICE O/P EST MOD 30 MIN: CPT | Mod: PBBFAC | Performed by: STUDENT IN AN ORGANIZED HEALTH CARE EDUCATION/TRAINING PROGRAM

## 2022-01-28 PROCEDURE — 84466 ASSAY OF TRANSFERRIN: CPT | Performed by: STUDENT IN AN ORGANIZED HEALTH CARE EDUCATION/TRAINING PROGRAM

## 2022-01-28 PROCEDURE — 99204 OFFICE O/P NEW MOD 45 MIN: CPT | Mod: S$PBB,,, | Performed by: STUDENT IN AN ORGANIZED HEALTH CARE EDUCATION/TRAINING PROGRAM

## 2022-01-28 PROCEDURE — 99999 PR PBB SHADOW E&M-EST. PATIENT-LVL IV: ICD-10-PCS | Mod: PBBFAC,,, | Performed by: STUDENT IN AN ORGANIZED HEALTH CARE EDUCATION/TRAINING PROGRAM

## 2022-01-28 PROCEDURE — 1159F PR MEDICATION LIST DOCUMENTED IN MEDICAL RECORD: ICD-10-PCS | Mod: CPTII,,, | Performed by: STUDENT IN AN ORGANIZED HEALTH CARE EDUCATION/TRAINING PROGRAM

## 2022-01-28 PROCEDURE — 1159F MED LIST DOCD IN RCRD: CPT | Mod: CPTII,,, | Performed by: STUDENT IN AN ORGANIZED HEALTH CARE EDUCATION/TRAINING PROGRAM

## 2022-01-28 PROCEDURE — 99204 PR OFFICE/OUTPT VISIT, NEW, LEVL IV, 45-59 MIN: ICD-10-PCS | Mod: S$PBB,,, | Performed by: STUDENT IN AN ORGANIZED HEALTH CARE EDUCATION/TRAINING PROGRAM

## 2022-01-28 PROCEDURE — 3078F PR MOST RECENT DIASTOLIC BLOOD PRESSURE < 80 MM HG: ICD-10-PCS | Mod: CPTII,,, | Performed by: STUDENT IN AN ORGANIZED HEALTH CARE EDUCATION/TRAINING PROGRAM

## 2022-01-28 PROCEDURE — 82728 ASSAY OF FERRITIN: CPT | Performed by: STUDENT IN AN ORGANIZED HEALTH CARE EDUCATION/TRAINING PROGRAM

## 2022-01-28 PROCEDURE — 3008F PR BODY MASS INDEX (BMI) DOCUMENTED: ICD-10-PCS | Mod: CPTII,,, | Performed by: STUDENT IN AN ORGANIZED HEALTH CARE EDUCATION/TRAINING PROGRAM

## 2022-01-28 RX ORDER — BLOOD SUGAR DIAGNOSTIC
1 STRIP MISCELLANEOUS 4 TIMES DAILY
COMMUNITY
Start: 2021-12-20 | End: 2022-10-13

## 2022-01-28 RX ORDER — FERROUS SULFATE 325(65) MG
TABLET ORAL 2 TIMES DAILY
COMMUNITY
Start: 2021-12-21 | End: 2022-10-13 | Stop reason: SDUPTHER

## 2022-01-28 RX ORDER — NAPROXEN 250 MG/1
250 TABLET ORAL 2 TIMES DAILY
Status: ON HOLD | COMMUNITY
Start: 2021-12-15 | End: 2022-05-12 | Stop reason: HOSPADM

## 2022-01-28 RX ORDER — ATORVASTATIN CALCIUM 40 MG/1
TABLET, FILM COATED ORAL
COMMUNITY
Start: 2021-09-10 | End: 2022-10-13 | Stop reason: SDUPTHER

## 2022-01-28 RX ORDER — ALBUTEROL SULFATE 90 UG/1
AEROSOL, METERED RESPIRATORY (INHALATION)
COMMUNITY
Start: 2022-01-04 | End: 2022-10-20

## 2022-01-28 RX ORDER — LANCETS 30 GAUGE
EACH MISCELLANEOUS
COMMUNITY
Start: 2021-10-06

## 2022-01-28 NOTE — PROGRESS NOTES
"    [facility] Consultation Note    Reason for Consult:  Weight loss     PCP:   Babak Bryan   No address on file    Referring MD:  Aaareferral Self  No address on file    HPI:  This is a 62 y.o. male with PMHx of PAD on DAPT here for evaluation of unintentional weight loss. He states that he went from 125 to 110 lbs over the span of a few months in 2021. Currently he feels the weight loss has stabilized. His appetite is good. Denied any changes in his bowel habits, blood in stool, diarrhea, constipation, dysphagia, hematemesis, N/V, early satiety. He does have reflux symptoms intermittently. Most frequently at night and he describes this as burning epigastric pain which is relieved with tums. No prior endoscopy on file here but states he had cscope 3-4 years ago at [facility] which removed "14 things" possibly polyps. He had another cscope 1 year later and then was told to come back in 5 years. No prior EGDs. Most recent abdominal imaging is a CTA from 2019 which showed multiple areas of significant stenosis. He is on DAPT. Hg is 9 from a 2020 CBC. CMP unremarkable. States he has been on iron replacement in the past. No FH of gastric or colon cancer.       ROS:  Constitutional: No fevers, chills, No weight loss  ENT: No allergies  CV: No chest pain  Pulm: No cough, No shortness of breath  Ophtho: No vision changes  GI: see HPI  Derm: No rash  Heme: No lymphadenopathy, No bruising  MSK: No arthritis  : No dysuria, No hematuria  Endo: No hot or cold intolerance  Neuro: No syncope, No seizure  Psych: No anxiety, No depression    Medical History:  has a past medical history of Cigarette smoker, Diabetes mellitus, History of alcohol abuse, Hyperlipidemia, Hypertension, PVD (peripheral vascular disease), and Seizures.    Surgical History:  has a past surgical history that includes Brain surgery.    Family History: family history is not on file..   No contributory family history     Social History:  " "reports that he has been smoking. He has been smoking about 1.50 packs per day. He has never used smokeless tobacco. He reports previous alcohol use. He reports previous drug use.    Review of patient's allergies indicates:  No Known Allergies    Current Outpatient Rx   Medication Sig Dispense Refill    albuterol (PROVENTIL/VENTOLIN HFA) 90 mcg/actuation inhaler Inhale into the lungs.      amLODIPine (NORVASC) 10 MG tablet Take 1 tablet (10 mg total) by mouth once daily. 30 tablet 0    atorvastatin (LIPITOR) 40 MG tablet       clopidogrel (PLAVIX) 75 mg tablet Take 1 tablet (75 mg total) by mouth once daily. 30 tablet 0    dulaglutide (TRULICITY) 0.75 mg/0.5 mL pen injector Inject 0.75 mg into the skin every 7 days.      ergocalciferol (ERGOCALCIFEROL) 50,000 unit Cap Take 50,000 Units by mouth every 7 days.      ferrous sulfate (FEOSOL) 325 mg (65 mg iron) Tab tablet Take by mouth 2 (two) times daily.      gabapentin (NEURONTIN) 300 MG capsule Take 300 mg by mouth.      latanoprost 0.005 % dpem Apply to eye.      metFORMIN (GLUCOPHAGE) 500 MG tablet Take 1,000 mg by mouth 2 (two) times daily with meals.       naproxen (NAPROSYN) 250 MG tablet Take 250 mg by mouth 2 (two) times daily.      ONETOUCH ULTRA TEST Strp 1 strip 4 (four) times daily.      ONETOUCH ULTRA2 METER Misc SMARTSI Each Via Meter As Directed      tamsulosin (FLOMAX) 0.4 mg Cap Take 0.4 mg by mouth once daily.      aspirin 81 MG Chew Take 1 tablet (81 mg total) by mouth once daily.  0    pravastatin (PRAVACHOL) 40 MG tablet Take 40 mg by mouth once daily.         Objective Findings:    Vital Signs:  BP (!) 140/78   Pulse 85   Ht 5' 5" (1.651 m)   Wt 51.4 kg (113 lb 5.1 oz)   BMI 18.86 kg/m²   Body mass index is 18.86 kg/m².    Physical Exam:  General Appearance: Well appearing in no acute distress  Head:   Normocephalic, without obvious abnormality  Eyes:    No scleral icterus, EOMI  ENT: Neck supple, Lips, mucosa, and tongue " normal; teeth and gums normal  Lungs: CTA bilaterally in anterior and posterior fields, no wheezes, no crackles.  Heart:  Regular rate and rhythm, S1, S2 normal, no murmurs heard  Abdomen: Soft, non tender, non distended with positive bowel sounds in all four quadrants. No hepatosplenomegaly, ascites, or mass  Extremities: 2+ pulses, no clubbing, cyanosis or edema  Skin: No rash  Neurologic: CN II-XII intact      Labs:  Lab Results   Component Value Date    WBC 9.04 08/12/2019    HGB 12.6 (L) 08/12/2019    HCT 38.8 (L) 08/12/2019     (H) 08/12/2019    ALT 13 08/05/2019    AST 12 08/05/2019     08/12/2019    K 3.9 08/12/2019     08/12/2019    CREATININE 0.8 08/12/2019    BUN 6 (L) 08/12/2019    CO2 28 08/12/2019    INR 1.0 08/05/2019    INR 1.0 08/05/2019    HGBA1C 6.1 (H) 08/10/2019       Imaging: reviewed     Endoscopy:  None     Assessment:    1. Unintentional weight loss   2. Microcytic anemia   3. PAD on DAPT     Patient with weight loss unintentionally over the past year with a microcytic anemia noted on 2020 lab work. Will repeat today. Patient has mild reflux symptoms but otherwise asymptomatic from GI standpoint. No overt GI bleeding. Plan for EGD and cscope for further evaluation     Recommendations:    - CBC and iron studies today   - EGD and cscope for weight loss and microcytic anemia   - Further plan based on results of above     Follow up for After endoscopy .      Order summary:  Orders Placed This Encounter    CBC Auto Differential    IRON AND TIBC    FERRITIN    Case Request Endoscopy: EGD (ESOPHAGOGASTRODUODENOSCOPY), COLONOSCOPY         Thank you so much for allowing me to participate in the care of Jerry Galeana    Fozia Mcdonald MD  Gastroenterology   [facility]

## 2022-01-31 ENCOUNTER — TELEPHONE (OUTPATIENT)
Dept: ENDOSCOPY | Facility: HOSPITAL | Age: 62
End: 2022-01-31
Payer: MEDICAID

## 2022-01-31 NOTE — TELEPHONE ENCOUNTER
Pt has order for egd/colonoscopy. Can we hold plavix x5 days prior to procedure per protocol? Please advise.

## 2022-02-15 ENCOUNTER — TELEPHONE (OUTPATIENT)
Dept: ENDOSCOPY | Facility: HOSPITAL | Age: 62
End: 2022-02-15
Payer: MEDICAID

## 2022-03-11 ENCOUNTER — TELEPHONE (OUTPATIENT)
Dept: ENDOSCOPY | Facility: HOSPITAL | Age: 62
End: 2022-03-11
Payer: MEDICAID

## 2022-03-17 ENCOUNTER — TELEPHONE (OUTPATIENT)
Dept: CARDIOLOGY | Facility: CLINIC | Age: 62
End: 2022-03-17
Payer: MEDICAID

## 2022-03-17 ENCOUNTER — TELEPHONE (OUTPATIENT)
Dept: ENDOSCOPY | Facility: HOSPITAL | Age: 62
End: 2022-03-17
Payer: MEDICAID

## 2022-03-17 NOTE — TELEPHONE ENCOUNTER
No answer. LM for pt to return call if he still needs us.    ----- Message from Josette [place] sent at 3/16/2022 12:02 PM CDT -----  Regarding: self  .Type: Patient Call Back    Who called: self     What is the request in detail: has appt coming up for proc and tests and needs to speak with nurse regarding a cardiac clearance. Please call     Can the clinic reply by [facility]?    Would the patient rather a call back or a response via [facility]? Call     Best call back number: .[phone]

## 2022-03-18 ENCOUNTER — TELEPHONE (OUTPATIENT)
Dept: ENDOSCOPY | Facility: HOSPITAL | Age: 62
End: 2022-03-18
Payer: MEDICAID

## 2022-03-18 NOTE — TELEPHONE ENCOUNTER
Good morning,    I spoke with patient to schedule his EGD/Colonoscopy. He stated he was no longer on Plavix and he stopped taking it 2 days ago. Can you please clarify if patient is on Plavix or not. And if he is still on Plavix, can he hold it 5 days prior? Please advise.      Thank you,  Caitlin

## 2022-03-21 DIAGNOSIS — Z12.11 SPECIAL SCREENING FOR MALIGNANT NEOPLASMS, COLON: Primary | ICD-10-CM

## 2022-03-21 RX ORDER — SODIUM, POTASSIUM,MAG SULFATES 17.5-3.13G
SOLUTION, RECONSTITUTED, ORAL ORAL
Qty: 1 KIT | Refills: 0 | Status: SHIPPED | OUTPATIENT
Start: 2022-03-21

## 2022-03-23 ENCOUNTER — TELEPHONE (OUTPATIENT)
Dept: CARDIOLOGY | Facility: CLINIC | Age: 62
End: 2022-03-23
Payer: MEDICAID

## 2022-03-23 NOTE — TELEPHONE ENCOUNTER
I called the pt to schedule an office visit with Dr. Mackay for a cardiac opinion. Pt reports he has an appt with an unknown [facility] provider on April 1st, but I did not see any appt scheduled for him. Pt notes he has never seen Dr. Mackay before and insists that I call [phone] to speak to said unknown provider before I schedule him an appt.     I called the number, but there was no answer. I left a message to call us back.

## 2022-03-24 NOTE — TELEPHONE ENCOUNTER
Yes, patient needs cardiac clearance for EGD/Colonoscopy and clarification if patient is still on Plavix or not.    Thank you,  Caitlin            March 23, 2022    Ian Pendleton MA  to Me  PH  10:57 AM  Dr. Mackay has never seen this pt before. Do you want him to come in for a cardiac opinion?     Gustabo Mackay MD  to Ian Pendleton MA     10:39 AM  I HAVE NEVER SEEN THIS PATIENT. PLEASE MAKE APPOINTMENT IN MY CLINIC IF CARDIAC OPINION IS NEEDED. THANKS

## 2022-03-29 ENCOUNTER — OFFICE VISIT (OUTPATIENT)
Dept: CARDIOLOGY | Facility: CLINIC | Age: 62
End: 2022-03-29
Payer: MEDICAID

## 2022-03-29 VITALS
BODY MASS INDEX: 18.88 KG/M2 | HEART RATE: 86 BPM | RESPIRATION RATE: 15 BRPM | WEIGHT: 113.31 LBS | SYSTOLIC BLOOD PRESSURE: 132 MMHG | DIASTOLIC BLOOD PRESSURE: 60 MMHG | OXYGEN SATURATION: 97 % | HEIGHT: 65 IN

## 2022-03-29 DIAGNOSIS — I73.9 PAD (PERIPHERAL ARTERY DISEASE): ICD-10-CM

## 2022-03-29 DIAGNOSIS — E78.2 MIXED HYPERLIPIDEMIA: ICD-10-CM

## 2022-03-29 DIAGNOSIS — Z72.0 TOBACCO ABUSE: ICD-10-CM

## 2022-03-29 DIAGNOSIS — I70.229 CRITICAL LOWER LIMB ISCHEMIA: ICD-10-CM

## 2022-03-29 DIAGNOSIS — I10 ESSENTIAL HYPERTENSION: ICD-10-CM

## 2022-03-29 DIAGNOSIS — I10 HYPERTENSION, UNSPECIFIED TYPE: Primary | ICD-10-CM

## 2022-03-29 DIAGNOSIS — M79.605 PAIN OF LEFT LOWER EXTREMITY: ICD-10-CM

## 2022-03-29 DIAGNOSIS — E11.51 DM (DIABETES MELLITUS) TYPE II, CONTROLLED, WITH PERIPHERAL VASCULAR DISORDER: ICD-10-CM

## 2022-03-29 PROCEDURE — 99204 OFFICE O/P NEW MOD 45 MIN: CPT | Mod: S$PBB,,, | Performed by: INTERNAL MEDICINE

## 2022-03-29 PROCEDURE — 3075F PR MOST RECENT SYSTOLIC BLOOD PRESS GE 130-139MM HG: ICD-10-PCS | Mod: CPTII,,, | Performed by: INTERNAL MEDICINE

## 2022-03-29 PROCEDURE — 99204 PR OFFICE/OUTPT VISIT, NEW, LEVL IV, 45-59 MIN: ICD-10-PCS | Mod: S$PBB,,, | Performed by: INTERNAL MEDICINE

## 2022-03-29 PROCEDURE — 1159F MED LIST DOCD IN RCRD: CPT | Mod: CPTII,,, | Performed by: INTERNAL MEDICINE

## 2022-03-29 PROCEDURE — 3078F PR MOST RECENT DIASTOLIC BLOOD PRESSURE < 80 MM HG: ICD-10-PCS | Mod: CPTII,,, | Performed by: INTERNAL MEDICINE

## 2022-03-29 PROCEDURE — 1160F PR REVIEW ALL MEDS BY PRESCRIBER/CLIN PHARMACIST DOCUMENTED: ICD-10-PCS | Mod: CPTII,,, | Performed by: INTERNAL MEDICINE

## 2022-03-29 PROCEDURE — 93005 ELECTROCARDIOGRAM TRACING: CPT | Mod: PBBFAC | Performed by: INTERNAL MEDICINE

## 2022-03-29 PROCEDURE — 93010 ELECTROCARDIOGRAM REPORT: CPT | Mod: S$PBB,,, | Performed by: INTERNAL MEDICINE

## 2022-03-29 PROCEDURE — 3008F BODY MASS INDEX DOCD: CPT | Mod: CPTII,,, | Performed by: INTERNAL MEDICINE

## 2022-03-29 PROCEDURE — 93010 EKG 12-LEAD: ICD-10-PCS | Mod: S$PBB,,, | Performed by: INTERNAL MEDICINE

## 2022-03-29 PROCEDURE — 1159F PR MEDICATION LIST DOCUMENTED IN MEDICAL RECORD: ICD-10-PCS | Mod: CPTII,,, | Performed by: INTERNAL MEDICINE

## 2022-03-29 PROCEDURE — 99214 OFFICE O/P EST MOD 30 MIN: CPT | Mod: PBBFAC | Performed by: INTERNAL MEDICINE

## 2022-03-29 PROCEDURE — 3078F DIAST BP <80 MM HG: CPT | Mod: CPTII,,, | Performed by: INTERNAL MEDICINE

## 2022-03-29 PROCEDURE — 1160F RVW MEDS BY RX/DR IN RCRD: CPT | Mod: CPTII,,, | Performed by: INTERNAL MEDICINE

## 2022-03-29 PROCEDURE — 3008F PR BODY MASS INDEX (BMI) DOCUMENTED: ICD-10-PCS | Mod: CPTII,,, | Performed by: INTERNAL MEDICINE

## 2022-03-29 PROCEDURE — 3075F SYST BP GE 130 - 139MM HG: CPT | Mod: CPTII,,, | Performed by: INTERNAL MEDICINE

## 2022-03-29 PROCEDURE — 99999 PR PBB SHADOW E&M-EST. PATIENT-LVL IV: ICD-10-PCS | Mod: PBBFAC,,, | Performed by: INTERNAL MEDICINE

## 2022-03-29 PROCEDURE — 99999 PR PBB SHADOW E&M-EST. PATIENT-LVL IV: CPT | Mod: PBBFAC,,, | Performed by: INTERNAL MEDICINE

## 2022-03-30 ENCOUNTER — TELEPHONE (OUTPATIENT)
Dept: ENDOSCOPY | Facility: HOSPITAL | Age: 62
End: 2022-03-30
Payer: MEDICAID

## 2022-03-30 NOTE — TELEPHONE ENCOUNTER
Informed patient he has to complete cardiac testing ordered by  before we can schedule his EGD/Colonoscopy. Patient verbalized understanding.

## 2022-03-30 NOTE — TELEPHONE ENCOUNTER
Patient needs to be scheduled for an EGD/Colonoscopy. Is it ok for him to hold Plavix 5 days prior. Please advise.    Thank you,  Caitlin

## 2022-04-12 ENCOUNTER — HOSPITAL ENCOUNTER (OUTPATIENT)
Dept: CARDIOLOGY | Facility: HOSPITAL | Age: 62
Discharge: HOME | End: 2022-04-12
Attending: INTERNAL MEDICINE
Payer: MEDICAID

## 2022-04-12 ENCOUNTER — HOSPITAL ENCOUNTER (OUTPATIENT)
Dept: RADIOLOGY | Facility: HOSPITAL | Age: 62
Discharge: HOME | End: 2022-04-12
Attending: INTERNAL MEDICINE
Payer: MEDICAID

## 2022-04-12 DIAGNOSIS — I10 HYPERTENSION, UNSPECIFIED TYPE: ICD-10-CM

## 2022-04-12 LAB
ABDOMINAL IMA AP: 1.2 CM
ABDOMINAL IMA ED VEL: 0 CM/S
ABDOMINAL IMA PS VEL: 73 CM/S
ABDOMINAL IMA TRANS: 1.8 CM
ABDOMINAL INFRARENAL AORTA AP: 1.5 CM
ABDOMINAL INFRARENAL AORTA ED VEL: 0 CM/S
ABDOMINAL INFRARENAL AORTA PS VEL: 87 CM/S
ABDOMINAL INFRARENAL AORTA TRANS: 1.8 CM
ABDOMINAL JUXTARENAL AORTA AP: 1.6 CM
ABDOMINAL JUXTARENAL AORTA ED VEL: 0 CM/S
ABDOMINAL JUXTARENAL AORTA PS VEL: 140 CM/S
ABDOMINAL JUXTARENAL AORTA TRANS: 1.5 CM
ABDOMINAL LT COM ILIAC AP: 0.8 CM
ABDOMINAL LT COM ILIAC TRANS: 0.8 CM
ABDOMINAL LT COM ILIAC VEL: 124 CM/S
ABDOMINAL LT COM ILLIAC ED VEL: 0 CM/S
ABDOMINAL RT COM ILIAC AP: 0.8 CM
ABDOMINAL RT COM ILIAC TRANS: 0.7 CM
ABDOMINAL RT COM ILIAC VEL: 285 CM/S
ABDOMINAL RT COM ILLIAC ED VEL: 0 CM/S
ABDOMINAL SUPRARENAL AORTA AP: 1.9 CM
ABDOMINAL SUPRARENAL AORTA ED VEL: 0 CM/S
ABDOMINAL SUPRARENAL AORTA PS VEL: 113 CM/S
ABDOMINAL SUPRARENAL AORTA TRANS: 2.1 CM
AV INDEX (PROSTH): 0.78
AV MEAN GRADIENT: 4 MMHG
AV PEAK GRADIENT: 7 MMHG
AV VALVE AREA: 1.96 CM2
AV VELOCITY RATIO: 0.65
CV ECHO LV RWT: 0.51 CM
DOP CALC AO PEAK VEL: 1.33 M/S
DOP CALC AO VTI: 26.33 CM
DOP CALC LVOT AREA: 2.5 CM2
DOP CALC LVOT DIAMETER: 1.79 CM
DOP CALC LVOT PEAK VEL: 0.86 M/S
DOP CALC LVOT STROKE VOLUME: 51.61 CM3
DOP CALC MV VTI: 27.92 CM
DOP CALCLVOT PEAK VEL VTI: 20.52 CM
E WAVE DECELERATION TIME: 213.36 MSEC
E/A RATIO: 1
E/E' RATIO: 11.05 M/S
ECHO LV POSTERIOR WALL: 0.99 CM (ref 0.6–1.1)
EJECTION FRACTION: 65 %
FRACTIONAL SHORTENING: 44 % (ref 28–44)
INTERVENTRICULAR SEPTUM: 0.94 CM (ref 0.6–1.1)
IVRT: 119.89 MSEC
LA MAJOR: 4.27 CM
LA MINOR: 4.49 CM
LA WIDTH: 3.87 CM
LEFT ABI: 0.92
LEFT ANT TIBIAL SYS PSV: 37 CM/S
LEFT ARM BP: 174 MMHG
LEFT ATRIUM SIZE: 3.42 CM
LEFT ATRIUM VOLUME: 49.24 CM3
LEFT CFA PSV: 146 CM/S
LEFT DORSALIS PEDIS: 160 MMHG
LEFT EXTERNAL ILIAC PSV: 171 CM/S
LEFT INTERNAL DIMENSION IN SYSTOLE: 2.17 CM (ref 2.1–4)
LEFT PERONEAL SYS PSV: 59 CM/S
LEFT POPLITEAL PSV: 59 CM/S
LEFT POST TIBIAL SYS PSV: 33 CM/S
LEFT POSTERIOR TIBIAL: 142 MMHG
LEFT PROFUNDA SYS PSV: 156 CM/S
LEFT SUPER FEMORAL DIST SYS PSV: 51 CM/S
LEFT SUPER FEMORAL MID SYS PSV: 165 CM/S
LEFT SUPER FEMORAL OSTIAL SYS PSV: 134 CM/S
LEFT SUPER FEMORAL PROX SYS PSV: 112 CM/S
LEFT TBI: 0.33
LEFT TIB/PER TRUNK SYS PSV: 107 CM/S
LEFT TOE PRESSURE: 57 MMHG
LEFT VENTRICLE DIASTOLIC VOLUME: 65.22 ML
LEFT VENTRICLE SYSTOLIC VOLUME: 15.69 ML
LEFT VENTRICULAR INTERNAL DIMENSION IN DIASTOLE: 3.88 CM (ref 3.5–6)
LEFT VENTRICULAR MASS: 115.18 G
LV LATERAL E/E' RATIO: 8.75 M/S
LV SEPTAL E/E' RATIO: 15 M/S
MV MEAN GRADIENT: 1 MMHG
MV PEAK A VEL: 1.05 M/S
MV PEAK E VEL: 1.05 M/S
MV PEAK GRADIENT: 5 MMHG
MV STENOSIS PRESSURE HALF TIME: 61.87 MS
MV VALVE AREA BY CONTINUITY EQUATION: 1.85 CM2
MV VALVE AREA P 1/2 METHOD: 3.56 CM2
PISA TR MAX VEL: 2.43 M/S
PULM VEIN S/D RATIO: 1.17
PV PEAK D VEL: 0.48 M/S
PV PEAK S VEL: 0.56 M/S
PV PEAK VELOCITY: 1.22 CM/S
RA MAJOR: 4.75 CM
RA PRESSURE: 3 MMHG
RA WIDTH: 3.77 CM
RIGHT ABI: 1.47
RIGHT ANT TIBIAL SYS PSV: 125 CM/S
RIGHT ARM BP: 170 MMHG
RIGHT CFA PSV: 154 CM/S
RIGHT DORSALIS PEDIS: 255 MMHG
RIGHT EXTERNAL ILLIAC PSV: 118 CM/S
RIGHT PERONEAL SYS PSV: 56 CM/S
RIGHT POPLITEAL PSV: 64 CM/S
RIGHT POST TIBIAL SYS PSV: 28 CM/S
RIGHT POSTERIOR TIBIAL: 255 MMHG
RIGHT PROFUNDA SYS PSV: 159 CM/S
RIGHT SUPER FEMORAL DIST SYS PSV: 63 CM/S
RIGHT SUPER FEMORAL MID SYS PSV: 51 CM/S
RIGHT SUPER FEMORAL OSTIAL SYS PSV: 100 CM/S
RIGHT SUPER FEMORAL PROX SYS PSV: 311 CM/S
RIGHT TBI: 0.39
RIGHT TIB/PER TRUNK SYS PSV: 68 CM/S
RIGHT TOE PRESSURE: 68 MMHG
RIGHT VENTRICULAR END-DIASTOLIC DIMENSION: 3.6 CM
RV TISSUE DOPPLER FREE WALL SYSTOLIC VELOCITY 1 (APICAL 4 CHAMBER VIEW): 17.51 CM/S
SINUS: 3.1 CM
STJ: 2.41 CM
TDI LATERAL: 0.12 M/S
TDI SEPTAL: 0.07 M/S
TDI: 0.1 M/S
TR MAX PG: 24 MMHG
TRICUSPID ANNULAR PLANE SYSTOLIC EXCURSION: 2.77 CM
TV REST PULMONARY ARTERY PRESSURE: 27 MMHG

## 2022-04-12 PROCEDURE — 93922 ANKLE BRACHIAL INDICES (ABI): ICD-10-PCS | Mod: 26,,, | Performed by: INTERNAL MEDICINE

## 2022-04-12 PROCEDURE — 93306 TTE W/DOPPLER COMPLETE: CPT | Mod: 26,,, | Performed by: INTERNAL MEDICINE

## 2022-04-12 PROCEDURE — 93925 CV US DOPPLER ARTERIAL LEGS BILATERAL (CUPID ONLY): ICD-10-PCS | Mod: 26,,, | Performed by: INTERNAL MEDICINE

## 2022-04-12 PROCEDURE — 93978 CV US ABDOMINAL AORTA EVALUATION (CUPID ONLY): ICD-10-PCS | Mod: 26,,, | Performed by: INTERNAL MEDICINE

## 2022-04-12 PROCEDURE — 93925 LOWER EXTREMITY STUDY: CPT | Mod: 26,,, | Performed by: INTERNAL MEDICINE

## 2022-04-12 PROCEDURE — 93922 UPR/L XTREMITY ART 2 LEVELS: CPT | Mod: 26,,, | Performed by: INTERNAL MEDICINE

## 2022-04-12 PROCEDURE — 93306 TTE W/DOPPLER COMPLETE: CPT

## 2022-04-12 PROCEDURE — 93922 UPR/L XTREMITY ART 2 LEVELS: CPT

## 2022-04-12 PROCEDURE — 93306 ECHO (CUPID ONLY): ICD-10-PCS | Mod: 26,,, | Performed by: INTERNAL MEDICINE

## 2022-04-12 PROCEDURE — 93925 LOWER EXTREMITY STUDY: CPT

## 2022-04-12 PROCEDURE — 93978 VASCULAR STUDY: CPT | Mod: 26,,, | Performed by: INTERNAL MEDICINE

## 2022-04-12 PROCEDURE — 93978 VASCULAR STUDY: CPT

## 2022-04-19 ENCOUNTER — HOSPITAL ENCOUNTER (OUTPATIENT)
Dept: CARDIOLOGY | Facility: HOSPITAL | Age: 62
Discharge: HOME | End: 2022-04-19
Attending: INTERNAL MEDICINE
Payer: MEDICAID

## 2022-04-19 ENCOUNTER — HOSPITAL ENCOUNTER (OUTPATIENT)
Dept: RADIOLOGY | Facility: HOSPITAL | Age: 62
Discharge: HOME | End: 2022-04-19
Attending: INTERNAL MEDICINE
Payer: MEDICAID

## 2022-04-19 LAB
CV STRESS BASE HR: 81 BPM
DIASTOLIC BLOOD PRESSURE: 59 MMHG
NUC STRESS EJECTION FRACTION: 77 %
OHS CV CPX 85 PERCENT MAX PREDICTED HEART RATE MALE: 133
OHS CV CPX MAX PREDICTED HEART RATE: 156
OHS CV CPX PATIENT IS FEMALE: 0
OHS CV CPX PATIENT IS MALE: 1
OHS CV CPX PEAK DIASTOLIC BLOOD PRESSURE: 71 MMHG
OHS CV CPX PEAK HEAR RATE: 99 BPM
OHS CV CPX PEAK RATE PRESSURE PRODUCT: NORMAL
OHS CV CPX PEAK SYSTOLIC BLOOD PRESSURE: 138 MMHG
OHS CV CPX PERCENT MAX PREDICTED HEART RATE ACHIEVED: 63
OHS CV CPX RATE PRESSURE PRODUCT PRESENTING: NORMAL
SYSTOLIC BLOOD PRESSURE: 130 MMHG

## 2022-04-19 PROCEDURE — 93018 CV STRESS TEST I&R ONLY: CPT | Mod: ,,, | Performed by: INTERNAL MEDICINE

## 2022-04-19 PROCEDURE — 78452 STRESS TEST WITH MYOCARDIAL PERFUSION (CUPID ONLY): ICD-10-PCS | Mod: 26,,, | Performed by: INTERNAL MEDICINE

## 2022-04-19 PROCEDURE — 93018 STRESS TEST WITH MYOCARDIAL PERFUSION (CUPID ONLY): ICD-10-PCS | Mod: ,,, | Performed by: INTERNAL MEDICINE

## 2022-04-19 PROCEDURE — 78452 HT MUSCLE IMAGE SPECT MULT: CPT | Mod: 26,,, | Performed by: INTERNAL MEDICINE

## 2022-04-19 PROCEDURE — 93016 STRESS TEST WITH MYOCARDIAL PERFUSION (CUPID ONLY): ICD-10-PCS | Mod: ,,, | Performed by: INTERNAL MEDICINE

## 2022-04-19 PROCEDURE — A9502 TC99M TETROFOSMIN: HCPCS

## 2022-04-19 PROCEDURE — 63600175 PHARM REV CODE 636 W HCPCS: Performed by: INTERNAL MEDICINE

## 2022-04-19 PROCEDURE — 93017 CV STRESS TEST TRACING ONLY: CPT

## 2022-04-19 PROCEDURE — 93016 CV STRESS TEST SUPVJ ONLY: CPT | Mod: ,,, | Performed by: INTERNAL MEDICINE

## 2022-04-19 RX ORDER — REGADENOSON 0.08 MG/ML
0.4 INJECTION, SOLUTION INTRAVENOUS ONCE
Status: COMPLETED | OUTPATIENT
Start: 2022-04-19 | End: 2022-04-19

## 2022-04-19 RX ADMIN — REGADENOSON 0.4 MG: 0.08 INJECTION, SOLUTION INTRAVENOUS at 09:04

## 2022-04-21 ENCOUNTER — OFFICE VISIT (OUTPATIENT)
Dept: CARDIOLOGY | Facility: CLINIC | Age: 62
End: 2022-04-21
Payer: MEDICAID

## 2022-04-21 ENCOUNTER — TELEPHONE (OUTPATIENT)
Dept: ENDOSCOPY | Facility: HOSPITAL | Age: 62
End: 2022-04-21

## 2022-04-21 VITALS
HEART RATE: 81 BPM | RESPIRATION RATE: 15 BRPM | OXYGEN SATURATION: 98 % | HEIGHT: 65 IN | WEIGHT: 112.44 LBS | DIASTOLIC BLOOD PRESSURE: 67 MMHG | BODY MASS INDEX: 18.73 KG/M2 | SYSTOLIC BLOOD PRESSURE: 146 MMHG

## 2022-04-21 DIAGNOSIS — E11.622 DIABETES MELLITUS WITH SKIN ULCER: ICD-10-CM

## 2022-04-21 DIAGNOSIS — E11.51 DM (DIABETES MELLITUS) TYPE II, CONTROLLED, WITH PERIPHERAL VASCULAR DISORDER: ICD-10-CM

## 2022-04-21 DIAGNOSIS — E78.2 MIXED HYPERLIPIDEMIA: ICD-10-CM

## 2022-04-21 DIAGNOSIS — N40.0 BENIGN PROSTATIC HYPERPLASIA, UNSPECIFIED WHETHER LOWER URINARY TRACT SYMPTOMS PRESENT: ICD-10-CM

## 2022-04-21 DIAGNOSIS — I73.9 PAD (PERIPHERAL ARTERY DISEASE): ICD-10-CM

## 2022-04-21 DIAGNOSIS — L98.499 DIABETES MELLITUS WITH SKIN ULCER: ICD-10-CM

## 2022-04-21 DIAGNOSIS — Z72.0 TOBACCO ABUSE: ICD-10-CM

## 2022-04-21 DIAGNOSIS — I10 ESSENTIAL HYPERTENSION: Primary | ICD-10-CM

## 2022-04-21 DIAGNOSIS — Z74.09 DECREASED FUNCTIONAL MOBILITY AND ENDURANCE: ICD-10-CM

## 2022-04-21 PROCEDURE — 99214 OFFICE O/P EST MOD 30 MIN: CPT | Mod: S$PBB,,, | Performed by: INTERNAL MEDICINE

## 2022-04-21 PROCEDURE — 3078F DIAST BP <80 MM HG: CPT | Mod: CPTII,,, | Performed by: INTERNAL MEDICINE

## 2022-04-21 PROCEDURE — 3008F BODY MASS INDEX DOCD: CPT | Mod: CPTII,,, | Performed by: INTERNAL MEDICINE

## 2022-04-21 PROCEDURE — 1159F PR MEDICATION LIST DOCUMENTED IN MEDICAL RECORD: ICD-10-PCS | Mod: CPTII,,, | Performed by: INTERNAL MEDICINE

## 2022-04-21 PROCEDURE — 99214 OFFICE O/P EST MOD 30 MIN: CPT | Mod: PBBFAC | Performed by: INTERNAL MEDICINE

## 2022-04-21 PROCEDURE — 99999 PR PBB SHADOW E&M-EST. PATIENT-LVL IV: CPT | Mod: PBBFAC,,, | Performed by: INTERNAL MEDICINE

## 2022-04-21 PROCEDURE — 1160F PR REVIEW ALL MEDS BY PRESCRIBER/CLIN PHARMACIST DOCUMENTED: ICD-10-PCS | Mod: CPTII,,, | Performed by: INTERNAL MEDICINE

## 2022-04-21 PROCEDURE — 3077F SYST BP >= 140 MM HG: CPT | Mod: CPTII,,, | Performed by: INTERNAL MEDICINE

## 2022-04-21 PROCEDURE — 3078F PR MOST RECENT DIASTOLIC BLOOD PRESSURE < 80 MM HG: ICD-10-PCS | Mod: CPTII,,, | Performed by: INTERNAL MEDICINE

## 2022-04-21 PROCEDURE — 99999 PR PBB SHADOW E&M-EST. PATIENT-LVL IV: ICD-10-PCS | Mod: PBBFAC,,, | Performed by: INTERNAL MEDICINE

## 2022-04-21 PROCEDURE — 3077F PR MOST RECENT SYSTOLIC BLOOD PRESSURE >= 140 MM HG: ICD-10-PCS | Mod: CPTII,,, | Performed by: INTERNAL MEDICINE

## 2022-04-21 PROCEDURE — 1159F MED LIST DOCD IN RCRD: CPT | Mod: CPTII,,, | Performed by: INTERNAL MEDICINE

## 2022-04-21 PROCEDURE — 3008F PR BODY MASS INDEX (BMI) DOCUMENTED: ICD-10-PCS | Mod: CPTII,,, | Performed by: INTERNAL MEDICINE

## 2022-04-21 PROCEDURE — 1160F RVW MEDS BY RX/DR IN RCRD: CPT | Mod: CPTII,,, | Performed by: INTERNAL MEDICINE

## 2022-04-21 PROCEDURE — 99214 PR OFFICE/OUTPT VISIT, EST, LEVL IV, 30-39 MIN: ICD-10-PCS | Mod: S$PBB,,, | Performed by: INTERNAL MEDICINE

## 2022-04-21 NOTE — TELEPHONE ENCOUNTER
----- Message from Fabi Ware sent at 4/21/2022 11:38 AM CDT -----  Type: Patient Call Back    Who called: Lizzie/Dr Mackay office    What is the request in detail: stated that patient is cleared to have Colonoscopy.    Can the clinic reply by [facility]? no    Would the patient rather a call back or a response via [facility]? call    Best call back number: [phone]

## 2022-04-21 NOTE — PROGRESS NOTES
CARDIOVASCULAR CONSULTATION    REASON FOR CONSULT:   Jerry Galeana is a 62 y.o. male who presents for evaluation evaluation .      HISTORY OF PRESENT ILLNESS:     Patient is a pleasant 62-year-old man.  Past medical history of peripheral vascular disease status post left common iliac life stent left external iliac stent, atherectomy of PT.  States for the past month has been having chest pains.  Sometimes wake him up at night.  Denies orthopnea, PND, swelling of feet.    left PT atherectomy in 8/2019        CARDIAC CATHETERIZATION Bilateral 8/29/2019   Procedure: Angiogram Extremity Bilateral; Surgeon: Chucho Rubi III, MD; Location: [facility] INVASIVE LAB; Service: Vascular; Laterality: Bilateral;    ILIAC ARTERY STENT Left 08/06/2019   Left common iliac with a 7 x 60 mm LifeStent. Left external iliac artery stent placement with a 7 x 40 mm LifeStent      Past Medical History:   Diagnosis Date    Diabetes mellitus    History of alcohol abuse    Hyperlipidemia    Hypertension    Peripheral arterial disease    Seizures    Tobacco abuse     Notes from April 2022    Patient here for follow-up.  No chest pains.  Stress test did not show any significant ischemia.  Echo showed normal left ventricle systolic function.  Significant bilateral lower extremity peripheral vascular disease as noted below           Bilateral noncompressible ABIs.  Bilateral abnormal TBIs suggestive of severe hemodynamic stenosis.  Aortic atherosclerosis  Negative for AAA  Elevated velocity of 285 centimeters/second noted in right common iliac artery suggestive of 50-99% stenosis.  Elevated velocity of 311 centimeters/second noted in proximal right superficial femoral artery suggestive of 50-99% stenosis.  Monophasic flow right posterior tibial artery.  Decrease velocity from mid to distal left superficial femoral artery with monophasic flow distally suggestive of 50-99% stenosis.  The left ventricle is normal in size with mild  concentric hypertrophy and normal systolic function.  The estimated ejection fraction is 65%.  Normal left ventricular diastolic function.  Normal right ventricular size with normal right ventricular systolic function.  Normal central venous pressure (3 mmHg).  The estimated PA systolic pressure is 27 mmHg.  There is no pulmonary hypertension.    Normal myocardial perfusion scan. There is no evidence of myocardial ischemia or infarction.    The gated perfusion images showed an ejection fraction of 77% post stress.    The EKG portion of this study is negative for ischemia.    The patient reported no chest pain during the stress test.    There were no arrhythmias during stress            PAST MEDICAL HISTORY:     Past Medical History:   Diagnosis Date    Cigarette smoker     Diabetes mellitus     History of alcohol abuse     Hyperlipidemia     Hypertension     PVD (peripheral vascular disease)     Seizures        PAST SURGICAL HISTORY:     Past Surgical History:   Procedure Laterality Date    BRAIN SURGERY      patient states that he had surgery for seizures       ALLERGIES AND MEDICATION:   Review of patient's allergies indicates:  No Known Allergies     Medication List          Accurate as of April 21, 2022 11:26 AM. If you have any questions, ask your nurse or doctor.            CONTINUE taking these medications    albuterol 90 mcg/actuation inhaler  Commonly known as: PROVENTIL/VENTOLIN HFA     amLODIPine 10 MG tablet  Commonly known as: NORVASC  Take 1 tablet (10 mg total) by mouth once daily.     aspirin 81 MG Chew  Take 1 tablet (81 mg total) by mouth once daily.     atorvastatin 40 MG tablet  Commonly known as: LIPITOR     clopidogreL 75 mg tablet  Commonly known as: PLAVIX  Take 1 tablet (75 mg total) by mouth once daily.     dulaglutide 0.75 mg/0.5 mL pen injector  Commonly known as: TRULICITY     ergocalciferol 50,000 unit Cap  Commonly known as: ERGOCALCIFEROL     ferrous sulfate 325 mg (65 mg  "iron) Tab tablet  Commonly known as: FEOSOL     gabapentin 300 MG capsule  Commonly known as: NEURONTIN     latanoprost 0.005 % Dpem     metFORMIN 500 MG tablet  Commonly known as: GLUCOPHAGE     naproxen 250 MG tablet  Commonly known as: NAPROSYN     ONETOUCH ULTRA TEST Strp  Generic drug: blood sugar diagnostic     ONETOUCH ULTRA2 METER Misc  Generic drug: blood-glucose meter     pravastatin 40 MG tablet  Commonly known as: PRAVACHOL     SUPREP BOWEL PREP KIT 17.5-3.13-1.6 gram Solr  Generic drug: sodium,potassium,mag sulfates  As Directed     tamsulosin 0.4 mg Cap  Commonly known as: FLOMAX            SOCIAL HISTORY:     Social History     Socioeconomic History    Marital status: Legally    Tobacco Use    Smoking status: Current Every Day Smoker     Packs/day: 1.50    Smokeless tobacco: Never Used   Substance and Sexual Activity    Alcohol use: Not Currently     Comment: History of abuse    Drug use: Not Currently       FAMILY HISTORY:     Family History   Problem Relation Age of Onset    Colon polyps Neg Hx     Esophageal cancer Neg Hx        REVIEW OF SYSTEMS:   Review of Systems   Constitutional: Negative.   HENT: Negative.    Eyes: Negative.    Respiratory: Negative.    Endocrine: Negative.    Hematologic/Lymphatic: Negative.    Skin: Negative.    Musculoskeletal: Negative.    Gastrointestinal: Negative.    Genitourinary: Negative.    Neurological: Negative.    Psychiatric/Behavioral: Negative.    Allergic/Immunologic: Negative.        A 10 point review of systems was performed and all the pertinent positives have been mentioned. Rest of review of systems was negative.        PHYSICAL EXAM:     Vitals:    04/21/22 1119   BP: (!) 146/67   Pulse: 81   Resp: 15    Body mass index is 18.71 kg/m².  Weight: 51 kg (112 lb 7 oz)   Height: 5' 5" (165.1 cm)     Physical Exam  Vitals reviewed.   Constitutional:       Appearance: He is well-developed.   HENT:      Head: Normocephalic.   Eyes:      " Conjunctiva/sclera: Conjunctivae normal.      Pupils: Pupils are equal, round, and reactive to light.   Cardiovascular:      Rate and Rhythm: Normal rate and regular rhythm.      Heart sounds: Normal heart sounds.   Pulmonary:      Effort: Pulmonary effort is normal.      Breath sounds: Normal breath sounds.   Abdominal:      General: Bowel sounds are normal.      Palpations: Abdomen is soft.   Musculoskeletal:      Cervical back: Normal range of motion and neck supple.   Skin:     General: Skin is warm.   Neurological:      Mental Status: He is alert and oriented to person, place, and time.           DATA:     Laboratory:  CBC:  Recent Labs   Lab 08/11/19  0505 08/12/19  0459 01/28/22  1325   WBC 10.16 9.04 12.43   Hemoglobin 12.4 L 12.6 L 15.7   Hematocrit 38.0 L 38.8 L 49.7   Platelets 346 408 H 319       CHEMISTRIES:  Recent Labs   Lab 08/05/19  0922 08/05/19  1627 08/10/19  0530 08/11/19  0505 08/12/19  0459   Glucose 107   < > 149 H 173 H 225 H   Sodium 145   < > 139 138 140   Potassium 3.9   < > 3.9 3.8 3.9   BUN 11   < > 7 L 5 L 6 L   Creatinine 0.7   < > 0.6 0.7 0.8   eGFR if African American >60   < > >60 >60 >60   eGFR if non African American >60   < > >60 >60 >60   Calcium 10.4   < > 9.0 9.1 9.1   Magnesium 1.9  --   --   --   --     < > = values in this interval not displayed.       CARDIAC BIOMARKERS:        COAGS:  Recent Labs   Lab 08/05/19  0922 08/05/19  1627   INR 0.9 1.0  1.0       LIPIDS/LFTS:  Recent Labs   Lab 08/05/19  1627   AST 12   ALT 13       Hemoglobin A1C   Date Value Ref Range Status   08/10/2019 6.1 (H) 4.0 - 5.6 % Final     Comment:     ADA Screening Guidelines:  5.7-6.4%  Consistent with prediabetes  >or=6.5%  Consistent with diabetes  High levels of fetal hemoglobin interfere with the HbA1C  assay. Heterozygous hemoglobin variants (HbS, HgC, etc)do  not significantly interfere with this assay.   However, presence of multiple variants may affect accuracy.         TSH        The  ASCVD Risk score (Raheel DC Jr., et al., 2013) failed to calculate for the following reasons:    Cannot find a previous HDL lab    Cannot find a previous total cholesterol lab             ASSESSMENT AND PLAN     Patient Active Problem List   Diagnosis    Critical lower limb ischemia    Essential hypertension    DM (diabetes mellitus) type II, controlled, with peripheral vascular disorder    BPH (benign prostatic hyperplasia)    Mixed hyperlipidemia    Tobacco abuse    Pain of left lower extremity    Weakness of both lower extremities    Gait abnormality    Decreased functional mobility and endurance    Diabetes mellitus with skin ulcer    PAD (peripheral artery disease)       1. Stress test did not show any significant ischemia.    2. Bilateral severe peripheral vascular disease.  Patient currently undergoing workup for anemia and is supposed to undergo an EGD and colonoscopy.  After his workup of anemia is complete and if the patient is found to have no significant reason for his anemia after GI workup and has been cleared for dual antiplatelet therapy by GI, we will schedule patient for a peripheral angiogram and revascularization as needed.    3. Patient may proceed for EGD and colonoscopy at low to moderate risk for coronary ischemia.  Currently chest pain free.      Continue aggressive risk factor modification    Patient continues to smoke a pack a day.  I have asked him to quit smoking.          Thank you very much for involving me in the care of your patient.  Please do not hesitate to contact me if there are any questions.      Gustabo Mackay MD, FAC, [facility]  Interventional Cardiologist, [facility].           This note was dictated with the help of speech recognition software.  There might be un-intended errors and/or substitutions.

## 2022-04-22 DIAGNOSIS — Z12.11 SPECIAL SCREENING FOR MALIGNANT NEOPLASMS, COLON: Primary | ICD-10-CM

## 2022-04-22 RX ORDER — SODIUM, POTASSIUM,MAG SULFATES 17.5-3.13G
1 SOLUTION, RECONSTITUTED, ORAL ORAL DAILY
Qty: 1 KIT | Refills: 0 | Status: SHIPPED | OUTPATIENT
Start: 2022-04-22 | End: 2022-04-24

## 2022-05-12 ENCOUNTER — HOSPITAL ENCOUNTER (OUTPATIENT)
Facility: HOSPITAL | Age: 62
Discharge: HOME | End: 2022-05-12
Attending: STUDENT IN AN ORGANIZED HEALTH CARE EDUCATION/TRAINING PROGRAM | Admitting: STUDENT IN AN ORGANIZED HEALTH CARE EDUCATION/TRAINING PROGRAM
Payer: MEDICAID

## 2022-05-12 ENCOUNTER — ANESTHESIA (OUTPATIENT)
Dept: ENDOSCOPY | Facility: HOSPITAL | Age: 62
End: 2022-05-12
Payer: MEDICAID

## 2022-05-12 ENCOUNTER — ANESTHESIA EVENT (OUTPATIENT)
Dept: ENDOSCOPY | Facility: HOSPITAL | Age: 62
End: 2022-05-12
Payer: MEDICAID

## 2022-05-12 VITALS
TEMPERATURE: 98 F | HEART RATE: 72 BPM | OXYGEN SATURATION: 100 % | SYSTOLIC BLOOD PRESSURE: 135 MMHG | DIASTOLIC BLOOD PRESSURE: 61 MMHG | RESPIRATION RATE: 20 BRPM

## 2022-05-12 DIAGNOSIS — R63.4 WEIGHT LOSS: Primary | ICD-10-CM

## 2022-05-12 PROCEDURE — 88305 TISSUE EXAM BY PATHOLOGIST: CPT | Performed by: PATHOLOGY

## 2022-05-12 PROCEDURE — 45385 PR COLONOSCOPY,REMV LESN,SNARE: ICD-10-PCS | Mod: ,,, | Performed by: STUDENT IN AN ORGANIZED HEALTH CARE EDUCATION/TRAINING PROGRAM

## 2022-05-12 PROCEDURE — 27201089 HC SNARE, DISP (ANY): Performed by: STUDENT IN AN ORGANIZED HEALTH CARE EDUCATION/TRAINING PROGRAM

## 2022-05-12 PROCEDURE — 88342 IMHCHEM/IMCYTCHM 1ST ANTB: CPT | Performed by: PATHOLOGY

## 2022-05-12 PROCEDURE — 00813 ANES UPR LWR GI NDSC PX: CPT | Performed by: STUDENT IN AN ORGANIZED HEALTH CARE EDUCATION/TRAINING PROGRAM

## 2022-05-12 PROCEDURE — D9220A PRA ANESTHESIA: Mod: CRNA,,, | Performed by: NURSE ANESTHETIST, CERTIFIED REGISTERED

## 2022-05-12 PROCEDURE — 43239 EGD BIOPSY SINGLE/MULTIPLE: CPT | Performed by: STUDENT IN AN ORGANIZED HEALTH CARE EDUCATION/TRAINING PROGRAM

## 2022-05-12 PROCEDURE — 43239 PR EGD, FLEX, W/BIOPSY, SGL/MULTI: ICD-10-PCS | Mod: 51,,, | Performed by: STUDENT IN AN ORGANIZED HEALTH CARE EDUCATION/TRAINING PROGRAM

## 2022-05-12 PROCEDURE — 27200997: Performed by: STUDENT IN AN ORGANIZED HEALTH CARE EDUCATION/TRAINING PROGRAM

## 2022-05-12 PROCEDURE — 25000003 PHARM REV CODE 250: Performed by: NURSE ANESTHETIST, CERTIFIED REGISTERED

## 2022-05-12 PROCEDURE — D9220A PRA ANESTHESIA: ICD-10-PCS | Mod: ANES,,, | Performed by: ANESTHESIOLOGY

## 2022-05-12 PROCEDURE — 43239 EGD BIOPSY SINGLE/MULTIPLE: CPT | Mod: 51,,, | Performed by: STUDENT IN AN ORGANIZED HEALTH CARE EDUCATION/TRAINING PROGRAM

## 2022-05-12 PROCEDURE — 88342 CHG IMMUNOCYTOCHEMISTRY: ICD-10-PCS | Mod: 26,,, | Performed by: PATHOLOGY

## 2022-05-12 PROCEDURE — 27202087 HC PROBE, APC: Performed by: STUDENT IN AN ORGANIZED HEALTH CARE EDUCATION/TRAINING PROGRAM

## 2022-05-12 PROCEDURE — 27201012 HC FORCEPS, HOT/COLD, DISP: Performed by: STUDENT IN AN ORGANIZED HEALTH CARE EDUCATION/TRAINING PROGRAM

## 2022-05-12 PROCEDURE — D9220A PRA ANESTHESIA: ICD-10-PCS | Mod: CRNA,,, | Performed by: NURSE ANESTHETIST, CERTIFIED REGISTERED

## 2022-05-12 PROCEDURE — 37000008 HC ANESTHESIA 1ST 15 MINUTES: Performed by: STUDENT IN AN ORGANIZED HEALTH CARE EDUCATION/TRAINING PROGRAM

## 2022-05-12 PROCEDURE — 45382 PR COLONOSCOPY,FLEX,W/CONTROL, BLEEDING: ICD-10-PCS | Mod: 59,,, | Performed by: STUDENT IN AN ORGANIZED HEALTH CARE EDUCATION/TRAINING PROGRAM

## 2022-05-12 PROCEDURE — 88305 TISSUE EXAM BY PATHOLOGIST: CPT | Mod: 26,,, | Performed by: PATHOLOGY

## 2022-05-12 PROCEDURE — 37000009 HC ANESTHESIA EA ADD 15 MINS: Performed by: STUDENT IN AN ORGANIZED HEALTH CARE EDUCATION/TRAINING PROGRAM

## 2022-05-12 PROCEDURE — 88342 IMHCHEM/IMCYTCHM 1ST ANTB: CPT | Mod: 26,,, | Performed by: PATHOLOGY

## 2022-05-12 PROCEDURE — 25000003 PHARM REV CODE 250: Performed by: STUDENT IN AN ORGANIZED HEALTH CARE EDUCATION/TRAINING PROGRAM

## 2022-05-12 PROCEDURE — 45385 COLONOSCOPY W/LESION REMOVAL: CPT | Performed by: STUDENT IN AN ORGANIZED HEALTH CARE EDUCATION/TRAINING PROGRAM

## 2022-05-12 PROCEDURE — 63600175 PHARM REV CODE 636 W HCPCS: Performed by: NURSE ANESTHETIST, CERTIFIED REGISTERED

## 2022-05-12 PROCEDURE — 45385 COLONOSCOPY W/LESION REMOVAL: CPT | Mod: ,,, | Performed by: STUDENT IN AN ORGANIZED HEALTH CARE EDUCATION/TRAINING PROGRAM

## 2022-05-12 PROCEDURE — 45382 COLONOSCOPY W/CONTROL BLEED: CPT | Mod: 59,,, | Performed by: STUDENT IN AN ORGANIZED HEALTH CARE EDUCATION/TRAINING PROGRAM

## 2022-05-12 PROCEDURE — D9220A PRA ANESTHESIA: Mod: ANES,,, | Performed by: ANESTHESIOLOGY

## 2022-05-12 PROCEDURE — 45382 COLONOSCOPY W/CONTROL BLEED: CPT | Mod: 59 | Performed by: STUDENT IN AN ORGANIZED HEALTH CARE EDUCATION/TRAINING PROGRAM

## 2022-05-12 PROCEDURE — 88305 TISSUE EXAM BY PATHOLOGIST: ICD-10-PCS | Mod: 26,,, | Performed by: PATHOLOGY

## 2022-05-12 RX ORDER — SODIUM CHLORIDE 0.9 % (FLUSH) 0.9 %
10 SYRINGE (ML) INJECTION
Status: DISCONTINUED | OUTPATIENT
Start: 2022-05-12 | End: 2022-05-12 | Stop reason: HOSPADM

## 2022-05-12 RX ORDER — LIDOCAINE HYDROCHLORIDE 20 MG/ML
INJECTION, SOLUTION EPIDURAL; INFILTRATION; INTRACAUDAL; PERINEURAL
Status: DISCONTINUED
Start: 2022-05-12 | End: 2022-05-12 | Stop reason: HOSPADM

## 2022-05-12 RX ORDER — SODIUM CHLORIDE 9 MG/ML
INJECTION, SOLUTION INTRAVENOUS CONTINUOUS
Status: DISCONTINUED | OUTPATIENT
Start: 2022-05-12 | End: 2022-05-12 | Stop reason: HOSPADM

## 2022-05-12 RX ORDER — PROPOFOL 10 MG/ML
VIAL (ML) INTRAVENOUS
Status: DISCONTINUED | OUTPATIENT
Start: 2022-05-12 | End: 2022-05-12

## 2022-05-12 RX ORDER — PROPOFOL 10 MG/ML
INJECTION, EMULSION INTRAVENOUS
Status: DISCONTINUED
Start: 2022-05-12 | End: 2022-05-12 | Stop reason: HOSPADM

## 2022-05-12 RX ORDER — LIDOCAINE HYDROCHLORIDE 20 MG/ML
INJECTION INTRAVENOUS
Status: DISCONTINUED | OUTPATIENT
Start: 2022-05-12 | End: 2022-05-12

## 2022-05-12 RX ORDER — OMEPRAZOLE 40 MG/1
40 CAPSULE, DELAYED RELEASE ORAL DAILY
Qty: 30 CAPSULE | Refills: 11 | Status: SHIPPED | OUTPATIENT
Start: 2022-05-12 | End: 2023-01-19 | Stop reason: SDUPTHER

## 2022-05-12 RX ADMIN — PROPOFOL 30 MG: 10 INJECTION, EMULSION INTRAVENOUS at 02:05

## 2022-05-12 RX ADMIN — LIDOCAINE HYDROCHLORIDE 100 MG: 20 INJECTION, SOLUTION INTRAVENOUS at 02:05

## 2022-05-12 RX ADMIN — SODIUM CHLORIDE: 0.9 INJECTION, SOLUTION INTRAVENOUS at 01:05

## 2022-05-12 RX ADMIN — PROPOFOL 70 MG: 10 INJECTION, EMULSION INTRAVENOUS at 02:05

## 2022-05-12 NOTE — ANESTHESIA PREPROCEDURE EVALUATION
05/12/2022  Jerry Galeana is a 62 y.o., male.      Pre-op Assessment          Review of Systems  Anesthesia Hx:  No previous Anesthesia    Social:  Non-Smoker    Hematology/Oncology:  Hematology Normal   Oncology Normal     EENT/Dental:EENT/Dental Normal   Cardiovascular:   Hypertension    Pulmonary:  Pulmonary Normal    Renal/:  Renal/ Normal     Hepatic/GI:  Hepatic/GI Normal    Musculoskeletal:  Musculoskeletal Normal    Neurological:   Seizures    Endocrine:   Diabetes    Dermatological:  Skin Normal    Psych:  Psychiatric Normal           Physical Exam  General: Well nourished    Airway:  Mallampati: II   Mouth Opening: Normal  TM Distance: 4 - 6 cm  Tongue: Normal  Neck ROM: Normal ROM        Anesthesia Plan  Type of Anesthesia, risks & benefits discussed:    Anesthesia Type: Gen Natural Airway  Post Op Pain Control Plan: multimodal analgesia  Informed Consent: Patient consented to blood products? Yes  ASA Score: 3    Ready For Surgery From Anesthesia Perspective.     .

## 2022-05-12 NOTE — TRANSFER OF CARE
Anesthesia Transfer of Care Note    Patient: Jerry Galeana    Procedure(s) Performed: Procedure(s) (LRB):  EGD (ESOPHAGOGASTRODUODENOSCOPY) (N/A)  COLONOSCOPY (N/A)    Patient location: GI    Anesthesia Type: general    Transport from OR: Transported from OR on room air with adequate spontaneous ventilation    Post pain: adequate analgesia    Post assessment: no apparent anesthetic complications and tolerated procedure well    Post vital signs: stable    Level of consciousness: awake    Nausea/Vomiting: no nausea/vomiting    Complications: none    Transfer of care protocol was followed      Last vitals:   Visit Vitals  /61   Pulse 77   Temp 36.8 °C (98.2 °F) (Oral)   Resp 18   SpO2 98%

## 2022-05-12 NOTE — OR NURSING
Pt ride is here to transport home.  Pt alert and oriented, denies any pain or discomfort.  IV discontinued, cath tip in place, gauze applied.  Pt walking out, gait steady with nurse at side.  All belongings with patient

## 2022-05-12 NOTE — PROVATION PATIENT INSTRUCTIONS
Discharge Summary/Instructions after an Endoscopic Procedure  Patient Name: Jerry Galeana  Patient MRN: [id number]  Patient YOB: 1960  Thursday, May 12, 2022  Fozia Mcdonald MD  Dear patient,  As a result of recent federal legislation (The Federal Cures Act), you may   receive lab or pathology results from your procedure in your [facility]   account before your physician is able to contact you. Your physician or   their representative will relay the results to you with their   recommendations at their soonest availability.  Thank you,  RESTRICTIONS:  During your procedure today, you received medications for sedation.  These   medications may affect your judgment, balance and coordination.  Therefore,   for 24 hours, you have the following restrictions:   - DO NOT drive a car, operate machinery, make legal/financial decisions,   sign important papers or drink alcohol.    ACTIVITY:  Today: no heavy lifting, straining or running due to procedural   sedation/anesthesia.  The following day: return to full activity including work.  DIET:  Eat and drink normally unless instructed otherwise.     TREATMENT FOR COMMON SIDE EFFECTS:  - Mild abdominal pain, nausea, belching, bloating or excessive gas:  rest,   eat lightly and use a heating pad.  - Sore Throat: treat with throat lozenges and/or gargle with warm salt   water.  - Because air was used during the procedure, expelling large amounts of air   from your rectum or belching is normal.  - If a bowel prep was taken, you may not have a bowel movement for 1-3 days.    This is normal.  SYMPTOMS TO WATCH FOR AND REPORT TO YOUR PHYSICIAN:  1. Abdominal pain or bloating, other than gas cramps.  2. Chest pain.  3. Back pain.  4. Signs of infection such as: chills or fever occurring within 24 hours   after the procedure.  5. Rectal bleeding, which would show as bright red, maroon, or black stools.   (A tablespoon of blood from the rectum is not serious, especially if    hemorrhoids are present.)  6. Vomiting.  7. Weakness or dizziness.  GO DIRECTLY TO THE NEAREST EMERGENCY ROOM IF YOU HAVE ANY OF THE FOLLOWING:      Difficulty breathing              Chills and/or fever over 101 F   Persistent vomiting and/or vomiting blood   Severe abdominal pain   Severe chest pain   Black, tarry stools   Bleeding- more than one tablespoon   Any other symptom or condition that you feel may need urgent attention  Your doctor recommends these additional instructions:  If any biopsies were taken, your doctors clinic will contact you in 1 to 2   weeks with any results.  - Discharge patient to home (ambulatory).   - Resume regular diet.   - Continue present medications.   - Await pathology results.   - Repeat colonoscopy in 7 years for surveillance.   - Resume plavix tomorrow   - Avoid naproxen (other other NSAIDS - advil/ibuprophen, motrin) for 7 days     For questions, problems or results please call your physician - Fozia Mcdonald MD at Work:  [phone].  [facility] can be reached at [phone]     IF A COMPLICATION OR EMERGENCY SITUATION ARISES AND YOU ARE UNABLE TO REACH   YOUR PHYSICIAN - GO DIRECTLY TO THE EMERGENCY ROOM.  Fozia Mcdonald MD  5/12/2022 2:43:35 PM  This report has been verified and signed electronically.  Dear patient,  As a result of recent federal legislation (The Federal Cures Act), you may   receive lab or pathology results from your procedure in your [facility]   account before your physician is able to contact you. Your physician or   their representative will relay the results to you with their   recommendations at their soonest availability.  Thank you,  PROVATION

## 2022-05-12 NOTE — OR NURSING
Pt alert and oriented, able to make needs known.  All belongings with patient. Steady to feet.  Awaiting patient's ride to pick him up

## 2022-05-12 NOTE — H&P
Short Stay Endoscopy History and Physical    PCP - Babak Bryan MD  Referring Physician - Fozia Mcdonald MD  [address] [place] [address]    Procedure - EGD and Colonoscopy  ASA - per anesthesia  Mallampati - per anesthesia  History of Anesthesia problems - no  Family history Anesthesia problems -  no   Plan of anesthesia - General    HPI  62 y.o. male    Reason for procedure:   Unintentional weight loss [R63.4]  Anemia, unspecified type [D64.9]      ROS:  Constitutional: No fevers, chills, No weight loss  CV: No chest pain  Pulm: No cough, No shortness of breath  GI: see HPI    Medical History:  has a past medical history of Cigarette smoker, Diabetes mellitus, History of alcohol abuse, Hyperlipidemia, Hypertension, PVD (peripheral vascular disease), and Seizures.    Surgical History:  has a past surgical history that includes Brain surgery.    Family History: family history is not on file.    Social History:  reports that he has been smoking. He has been smoking about 1.50 packs per day. He has never used smokeless tobacco. He reports previous alcohol use. He reports previous drug use.    Review of patient's allergies indicates:  No Known Allergies    Medications:   Medications Prior to Admission   Medication Sig Dispense Refill Last Dose    albuterol (PROVENTIL/VENTOLIN HFA) 90 mcg/actuation inhaler Inhale into the lungs.   Past Week at Unknown time    atorvastatin (LIPITOR) 40 MG tablet    Past Week at Unknown time    dulaglutide (TRULICITY) 0.75 mg/0.5 mL pen injector Inject 0.75 mg into the skin every 7 days.   Past Week at Unknown time    ergocalciferol (ERGOCALCIFEROL) 50,000 unit Cap Take 50,000 Units by mouth every 7 days.   Past Week at Unknown time    ferrous sulfate (FEOSOL) 325 mg (65 mg iron) Tab tablet Take by mouth 2 (two) times daily.   Past Week at Unknown time    latanoprost 0.005 % dpem Apply to eye.   Past Week at Unknown time    metFORMIN (GLUCOPHAGE) 500 MG tablet Take  1,000 mg by mouth 2 (two) times daily with meals.    Past Week at Unknown time    naproxen (NAPROSYN) 250 MG tablet Take 250 mg by mouth 2 (two) times daily.   Past Week at Unknown time    ONETOUCH ULTRA TEST Strp 1 strip 4 (four) times daily.   Past Week at Unknown time    ONETOUCH ULTRA2 METER Misc SMARTSI Each Via Meter As Directed   Past Week at Unknown time    pravastatin (PRAVACHOL) 40 MG tablet Take 40 mg by mouth once daily.   Past Week at Unknown time    sodium,potassium,mag sulfates (SUPREP BOWEL PREP KIT) 17.5-3.13-1.6 gram SolR As Directed 1 kit 0 2022 at Unknown time    tamsulosin (FLOMAX) 0.4 mg Cap Take 0.4 mg by mouth once daily.   Past Week at Unknown time    amLODIPine (NORVASC) 10 MG tablet Take 1 tablet (10 mg total) by mouth once daily. 30 tablet 0     aspirin 81 MG Chew Take 1 tablet (81 mg total) by mouth once daily.  0     clopidogrel (PLAVIX) 75 mg tablet Take 1 tablet (75 mg total) by mouth once daily. 30 tablet 0     gabapentin (NEURONTIN) 300 MG capsule Take 300 mg by mouth.          Physical Exam:    Vital Signs:   Vitals:    22 1300   BP: 133/61   Pulse: 77   Resp: 18   Temp: 98.2 °F (36.8 °C)       General Appearance: Well appearing in no acute distress  Abdomen: Soft, non tender, non distended with normal bowel sounds, no masses    Labs:  Lab Results   Component Value Date    WBC 12.43 2022    HGB 15.7 2022    HCT 49.7 2022     2022    ALT 13 2019    AST 12 2019     2019    K 3.9 2019     2019    CREATININE 0.8 2019    BUN 6 (L) 2019    CO2 28 2019    INR 1.0 2019    INR 1.0 2019    HGBA1C 6.1 (H) 08/10/2019       I have explained the risks and benefits of this endoscopic procedure to the patient including but not limited to bleeding, inflammation, infection, perforation, and death.    Assessment/Plan:     1. Weight loss   2. GERD   3. CRC Surveillance     -  Proceed with EGD and colonoscopy       Fozia Mcdonald MD  Gastroenterology   [facility]

## 2022-05-12 NOTE — PLAN OF CARE
MD speaking with patient regarding procedure, findings and recommendation.  Verbalized understanding.

## 2022-05-12 NOTE — PROVATION PATIENT INSTRUCTIONS
Discharge Summary/Instructions after an Endoscopic Procedure  Patient Name: Jerry Galeana  Patient MRN: [id number]  Patient YOB: 1960  Thursday, May 12, 2022  Fozia Mcdonald MD  Dear patient,  As a result of recent federal legislation (The Federal Cures Act), you may   receive lab or pathology results from your procedure in your [facility]   account before your physician is able to contact you. Your physician or   their representative will relay the results to you with their   recommendations at their soonest availability.  Thank you,  RESTRICTIONS:  During your procedure today, you received medications for sedation.  These   medications may affect your judgment, balance and coordination.  Therefore,   for 24 hours, you have the following restrictions:   - DO NOT drive a car, operate machinery, make legal/financial decisions,   sign important papers or drink alcohol.    ACTIVITY:  Today: no heavy lifting, straining or running due to procedural   sedation/anesthesia.  The following day: return to full activity including work.  DIET:  Eat and drink normally unless instructed otherwise.     TREATMENT FOR COMMON SIDE EFFECTS:  - Mild abdominal pain, nausea, belching, bloating or excessive gas:  rest,   eat lightly and use a heating pad.  - Sore Throat: treat with throat lozenges and/or gargle with warm salt   water.  - Because air was used during the procedure, expelling large amounts of air   from your rectum or belching is normal.  - If a bowel prep was taken, you may not have a bowel movement for 1-3 days.    This is normal.  SYMPTOMS TO WATCH FOR AND REPORT TO YOUR PHYSICIAN:  1. Abdominal pain or bloating, other than gas cramps.  2. Chest pain.  3. Back pain.  4. Signs of infection such as: chills or fever occurring within 24 hours   after the procedure.  5. Rectal bleeding, which would show as bright red, maroon, or black stools.   (A tablespoon of blood from the rectum is not serious, especially if    hemorrhoids are present.)  6. Vomiting.  7. Weakness or dizziness.  GO DIRECTLY TO THE NEAREST EMERGENCY ROOM IF YOU HAVE ANY OF THE FOLLOWING:      Difficulty breathing              Chills and/or fever over 101 F   Persistent vomiting and/or vomiting blood   Severe abdominal pain   Severe chest pain   Black, tarry stools   Bleeding- more than one tablespoon   Any other symptom or condition that you feel may need urgent attention  Your doctor recommends these additional instructions:  If any biopsies were taken, your doctors clinic will contact you in 1 to 2   weeks with any results.  - Discharge patient to home (ambulatory).   - Resume regular diet.   - Continue present medications. Start omeprazole 40mg daily, take 30 minutes   before meals   - Await pathology results.  For questions, problems or results please call your physician - Fozia Mcdonald MD at Work:  [phone].  [facility] can be reached at [phone]     IF A COMPLICATION OR EMERGENCY SITUATION ARISES AND YOU ARE UNABLE TO REACH   YOUR PHYSICIAN - GO DIRECTLY TO THE EMERGENCY ROOM.  Fozia Mcdonald MD  5/12/2022 2:37:54 PM  This report has been verified and signed electronically.  Dear patient,  As a result of recent federal legislation (The Federal Cures Act), you may   receive lab or pathology results from your procedure in your [facility]   account before your physician is able to contact you. Your physician or   their representative will relay the results to you with their   recommendations at their soonest availability.  Thank you,  PROVATION

## 2022-05-13 NOTE — ANESTHESIA POSTPROCEDURE EVALUATION
Anesthesia Post Evaluation    Patient: Jerry Galeana    Procedure(s) Performed: Procedure(s) (LRB):  EGD (ESOPHAGOGASTRODUODENOSCOPY) (N/A)  COLONOSCOPY (N/A)    Final Anesthesia Type: general      Patient location during evaluation: PACU  Patient participation: Yes- Able to Participate  Level of consciousness: awake and alert, oriented and awake  Post-procedure vital signs: reviewed and stable  Pain management: adequate  Airway patency: patent    PONV status at discharge: No PONV  Anesthetic complications: no      Cardiovascular status: blood pressure returned to baseline, hemodynamically stable and stable  Respiratory status: unassisted and spontaneous ventilation  Hydration status: euvolemic  Follow-up not needed.          Vitals Value Taken Time   /61 05/12/22 1456   Temp 36.6 °C (97.8 °F) 05/12/22 1439   Pulse 72 05/12/22 1456   Resp 20 05/12/22 1456   SpO2 100 % 05/12/22 1456         Event Time   Out of Recovery 15:07:00         Pain/Agustina Score: Agustina Score: 10 (5/12/2022  2:56 PM)

## 2022-05-17 ENCOUNTER — TELEPHONE (OUTPATIENT)
Dept: GASTROENTEROLOGY | Facility: CLINIC | Age: 62
End: 2022-05-17
Payer: MEDICAID

## 2022-05-17 DIAGNOSIS — A04.8 H. PYLORI INFECTION: Primary | ICD-10-CM

## 2022-05-17 LAB
FINAL PATHOLOGIC DIAGNOSIS: NORMAL
GROSS: NORMAL
Lab: NORMAL
SUPPLEMENTAL DIAGNOSIS: NORMAL

## 2022-05-17 RX ORDER — DOXYCYCLINE HYCLATE 100 MG
100 TABLET ORAL 2 TIMES DAILY
Qty: 28 TABLET | Refills: 0 | Status: SHIPPED | OUTPATIENT
Start: 2022-05-17 | End: 2022-05-31

## 2022-05-17 RX ORDER — METRONIDAZOLE 500 MG/1
500 TABLET ORAL 3 TIMES DAILY
Qty: 42 TABLET | Refills: 0 | Status: SHIPPED | OUTPATIENT
Start: 2022-05-17 | End: 2022-05-31

## 2022-05-17 RX ORDER — BISMUTH SUBSALICYLATE 262 MG/1
1 TABLET ORAL 4 TIMES DAILY
Qty: 56 TABLET | Refills: 0 | Status: SHIPPED | OUTPATIENT
Start: 2022-05-17 | End: 2022-05-31

## 2022-05-17 RX ORDER — OMEPRAZOLE 40 MG/1
40 CAPSULE, DELAYED RELEASE ORAL 2 TIMES DAILY
Qty: 28 CAPSULE | Refills: 0 | Status: SHIPPED | OUTPATIENT
Start: 2022-05-17 | End: 2022-05-31

## 2022-05-17 NOTE — TELEPHONE ENCOUNTER
----- Message from Fozia Mcdonald MD sent at 5/17/2022 12:57 PM CDT -----  Please make this patient a follow up in Gi clinic in next few months

## 2022-06-13 LAB — POCT GLUCOSE: 102 MG/DL (ref 70–110)

## 2022-10-04 ENCOUNTER — HOSPITAL ENCOUNTER (OUTPATIENT)
Dept: WOUND CARE | Facility: HOSPITAL | Age: 62
Discharge: HOME | End: 2022-10-04
Attending: FAMILY MEDICINE
Payer: MEDICAID

## 2022-10-04 VITALS
SYSTOLIC BLOOD PRESSURE: 128 MMHG | RESPIRATION RATE: 20 BRPM | WEIGHT: 108 LBS | BODY MASS INDEX: 17.99 KG/M2 | HEART RATE: 78 BPM | HEIGHT: 65 IN | TEMPERATURE: 98 F | DIASTOLIC BLOOD PRESSURE: 70 MMHG

## 2022-10-04 DIAGNOSIS — E11.622 DIABETES MELLITUS WITH SKIN ULCER: Primary | ICD-10-CM

## 2022-10-04 DIAGNOSIS — L98.499 DIABETES MELLITUS WITH SKIN ULCER: Primary | ICD-10-CM

## 2022-10-04 PROCEDURE — 99214 OFFICE O/P EST MOD 30 MIN: CPT | Mod: ,,, | Performed by: FAMILY MEDICINE

## 2022-10-04 PROCEDURE — 99215 OFFICE O/P EST HI 40 MIN: CPT | Performed by: FAMILY MEDICINE

## 2022-10-04 PROCEDURE — 99214 PR OFFICE/OUTPT VISIT, EST, LEVL IV, 30-39 MIN: ICD-10-PCS | Mod: ,,, | Performed by: FAMILY MEDICINE

## 2022-10-04 NOTE — PROGRESS NOTES
[facility]                Progress Note    Subjective:       Patient ID: Jerry Galeana is a 62 y.o. male.    Chief Complaint: Wound Check    Walked to clinic unaided. Has lost wt since last  visit in 2020 is now 108lb states has lost 20lb since last wt last 6 weeks. Continues to smoke a pack a day reviewed impact on healing. States does not have much of an appetite enc to eat healthy foods. Has severe linm ischemia with L TBI 0.33 and L 0.39.  Feet cool to touch. Made aware must follow up with vascular.States blood sugar was 155 enc to keep   for optimal healing. Two fibrin covered areas one on posterior leg lower which is small and raised and one on dorsal foot. Complains both are painful. No signs infection but very sensitive when manipulated. Has been placing Bacitracin on for 6 months and has run out recently. Will paint with betadine to dry and cover with Tegafoam to protect areas Return in one week     MD note:  patient following up today for ulcer areas to RLE.  There has been no open wounds.  He states the areas are painful.  He smokes 1ppd.  He has PAD with above TBI's.  There has been no drainage from the areas.      Review of Systems   Constitutional: Negative.    HENT: Negative.     Eyes: Negative.    Respiratory: Negative.     Cardiovascular: Negative.    Gastrointestinal: Negative.    Skin:  Positive for wound.   Psychiatric/Behavioral: Negative.         Objective:        Physical Exam  Constitutional:       Appearance: Normal appearance.   HENT:      Head: Normocephalic and atraumatic.      Right Ear: External ear normal.      Left Ear: External ear normal.      Mouth/Throat:      Mouth: Mucous membranes are moist.      Pharynx: Oropharynx is clear.   Eyes:      Extraocular Movements: Extraocular movements intact.      Conjunctiva/sclera: Conjunctivae normal.      Pupils: Pupils are equal, round, and reactive to light.   Cardiovascular:      Rate and  Rhythm: Normal rate and regular rhythm.   Pulmonary:      Effort: Pulmonary effort is normal. No respiratory distress.   Abdominal:      General: There is no distension.      Palpations: Abdomen is soft.   Musculoskeletal:      Right lower leg: No edema.      Left lower leg: No edema.   Skin:     General: Skin is warm and dry.      Comments: Dry skin areas to posterior right leg and anterior medial leg without open wound   Neurological:      Mental Status: He is alert.       Vitals:    10/04/22 1401   BP: 128/70   Pulse: 78   Resp: 20   Temp: 97.7 °F (36.5 °C)       Assessment:           ICD-10-CM ICD-9-CM   1. Diabetes mellitus with skin ulcer  E11.622 250.80    L98.499 707.9            Altered Skin Integrity 10/04/22 1421 Right dorsal Foot (Active)   10/04/22 1421   Altered Skin Integrity Present on Admission: yes   Side: Right   Orientation: dorsal   Location: Foot   Wound Number:    Is this injury device related?:    Primary Wound Type:    Description of Altered Skin Integrity:    Ankle-Brachial Index:    Pulses:    Removal Indication and Assessment:    Wound Outcome:    (Retired) Wound Length (cm):    (Retired) Wound Width (cm):    (Retired) Depth (cm):    Wound Description (Comments):    Removal Indications:    Wound Image   10/04/22 1421   Description of Altered Skin Integrity Intact skin with non-blanchable redness of localized area 10/04/22 1421   Dressing Appearance Open to air 10/04/22 1421   Drainage Amount None 10/04/22 1421   Appearance Fibrin 10/04/22 1421   Tissue loss description Partial thickness 10/04/22 1421   Periwound Area Dry 10/04/22 1421   Care Cleansed with:;Antimicrobial agent;Sterile normal saline 10/04/22 1421   Dressing Applied 10/04/22 1421            Altered Skin Integrity 10/04/22 1423 Right posterior Calf (Active)   10/04/22 1423   Altered Skin Integrity Present on Admission: yes   Side: Right   Orientation: posterior   Location: Calf   Wound Number:    Is this injury device  related?:    Primary Wound Type:    Description of Altered Skin Integrity:    Ankle-Brachial Index:    Pulses:    Removal Indication and Assessment:    Wound Outcome:    (Retired) Wound Length (cm):    (Retired) Wound Width (cm):    (Retired) Depth (cm):    Wound Description (Comments):    Removal Indications:    Wound Image   10/04/22 1421   Dressing Appearance Open to air 10/04/22 1421   Drainage Amount None 10/04/22 1421   Appearance Fibrin 10/04/22 1421   Periwound Area Dry 10/04/22 1421   Care Cleansed with:;Antimicrobial agent;Sterile normal saline 10/04/22 1421   Dressing Applied 10/04/22 1421   Dressing Change Due 10/11/22 10/04/22 1421           Plan:                Orders Placed This Encounter   Procedures    Change dressing     Paint both areas dorsal foot and posterior calf with betadine and apply Tegafoam over Benzoin        Follow up in about 1 week (around 10/11/2022).     Garrison Casarez MD

## 2022-10-11 ENCOUNTER — HOSPITAL ENCOUNTER (OUTPATIENT)
Dept: WOUND CARE | Facility: HOSPITAL | Age: 62
Discharge: HOME | End: 2022-10-11
Attending: FAMILY MEDICINE
Payer: MEDICAID

## 2022-10-11 VITALS
RESPIRATION RATE: 20 BRPM | HEART RATE: 91 BPM | SYSTOLIC BLOOD PRESSURE: 129 MMHG | DIASTOLIC BLOOD PRESSURE: 60 MMHG | TEMPERATURE: 99 F

## 2022-10-11 DIAGNOSIS — L98.499 DIABETES MELLITUS WITH SKIN ULCER: Primary | ICD-10-CM

## 2022-10-11 DIAGNOSIS — E11.622 DIABETES MELLITUS WITH SKIN ULCER: Primary | ICD-10-CM

## 2022-10-11 PROCEDURE — 99214 PR OFFICE/OUTPT VISIT, EST, LEVL IV, 30-39 MIN: ICD-10-PCS | Mod: ,,, | Performed by: FAMILY MEDICINE

## 2022-10-11 PROCEDURE — 99214 OFFICE O/P EST MOD 30 MIN: CPT | Mod: ,,, | Performed by: FAMILY MEDICINE

## 2022-10-11 PROCEDURE — 99212 OFFICE O/P EST SF 10 MIN: CPT | Performed by: FAMILY MEDICINE

## 2022-10-11 NOTE — PROGRESS NOTES
[facility]                Progress Note    Subjective:       Patient ID: Jerry Galeana is a 62 y.o. male.    Chief Complaint: Wound Check    F/u wound care visit. Patient ambulatory to exam room with no difficulty noted. Patient c/o 3/10 pain to right leg. Wound dressing noted to right posterior lower leg with no drainage noted. No wound dressing noted to right dorsal foot. All wounds are healed. Patient instructed to notify [facility] of any problems or concerns, patient verbalized understanding.    MD note:  patient following up today for wounds to both legs.  He continues to try and stop smoking.  There has been pain with walking.  No active drainage from wounds    Review of Systems      Objective:        Physical Exam    Vitals:    10/11/22 1315   BP: 129/60   Pulse: 91   Resp: 20   Temp: 98.8 °F (37.1 °C)       Assessment:           ICD-10-CM ICD-9-CM   1. Diabetes mellitus with skin ulcer  E11.622 250.80    L98.499 707.9       [REMOVED]      Altered Skin Integrity 10/04/22 1421 Right dorsal Foot (Removed)   10/04/22 1421   Altered Skin Integrity Present on Admission: yes   Side: Right   Orientation: dorsal   Location: Foot   Wound Number:    Is this injury device related?:    Primary Wound Type:    Description of Altered Skin Integrity:    Ankle-Brachial Index:    Pulses:    Removal Indication and Assessment:    Wound Outcome: Healed   (Retired) Wound Length (cm):    (Retired) Wound Width (cm):    (Retired) Depth (cm):    Wound Description (Comments):    Removal Indications:    Removed 10/11/22 1330   Wound Image   10/11/22 1339   Description of Altered Skin Integrity Intact skin with non-blanchable redness of localized area 10/11/22 1339   Dressing Appearance No dressing 10/11/22 1339   Drainage Amount None 10/11/22 1339   Appearance Epithelialization 10/11/22 1339   Tissue loss description Not applicable 10/11/22 1339   Periwound Area Dry;Intact 10/11/22 1339   Wound  Edges Rolled/closed 10/11/22 1339   Wound Length (cm) 0 cm 10/11/22 1339   Wound Width (cm) 0 cm 10/11/22 1339   Wound Depth (cm) 0 cm 10/11/22 1339   Wound Volume (cm^3) 0 cm^3 10/11/22 1339   Wound Surface Area (cm^2) 0 cm^2 10/11/22 1339   Care Cleansed with:;Sterile normal saline 10/11/22 1339       [REMOVED]      Altered Skin Integrity 10/04/22 1423 Right posterior Calf (Removed)   10/04/22 1423   Altered Skin Integrity Present on Admission: yes   Side: Right   Orientation: posterior   Location: Calf   Wound Number:    Is this injury device related?:    Primary Wound Type:    Description of Altered Skin Integrity:    Ankle-Brachial Index:    Pulses:    Removal Indication and Assessment:    Wound Outcome: Healed   (Retired) Wound Length (cm):    (Retired) Wound Width (cm):    (Retired) Depth (cm):    Wound Description (Comments):    Removal Indications:    Removed 10/11/22 1330   Wound Image   10/11/22 1339   Description of Altered Skin Integrity Intact skin with non-blanchable redness of localized area 10/11/22 1339   Dressing Appearance Dry;Intact 10/11/22 1339   Drainage Amount None 10/11/22 1339   Appearance Epithelialization 10/11/22 1339   Tissue loss description Not applicable 10/11/22 1339   Periwound Area Intact;Dry 10/11/22 1339   Wound Edges Rolled/closed 10/11/22 1339   Care Cleansed with:;Sterile normal saline 10/11/22 1339           Plan:             Wounds have closed  No new wounds  D/c from wound care and follow up with wound care [facility]       Follow up if symptoms worsen or fail to improve.     Garrison Casarez MD

## 2022-10-28 ENCOUNTER — HOSPITAL ENCOUNTER (OUTPATIENT)
Dept: RADIOLOGY | Facility: HOSPITAL | Age: 62
Discharge: HOME | End: 2022-10-28
Attending: NURSE PRACTITIONER
Payer: MEDICAID

## 2022-10-28 DIAGNOSIS — I88.9 NONSPECIFIC LYMPHADENITIS, UNSPECIFIED: ICD-10-CM

## 2022-10-28 DIAGNOSIS — R63.4 WEIGHT LOSS, UNINTENTIONAL: ICD-10-CM

## 2022-10-28 PROCEDURE — 71260 CT THORAX DX C+: CPT | Mod: TC

## 2022-10-28 PROCEDURE — 74177 CT ABD & PELVIS W/CONTRAST: CPT | Mod: TC

## 2022-10-28 PROCEDURE — 25500020 PHARM REV CODE 255: Performed by: NURSE PRACTITIONER

## 2022-10-28 PROCEDURE — 71260 CT THORAX DX C+: CPT | Mod: 26,,, | Performed by: INTERNAL MEDICINE

## 2022-10-28 PROCEDURE — 74177 CT CHEST ABDOMEN PELVIS WITH CONTRAST (XPD): ICD-10-PCS | Mod: 26,,, | Performed by: INTERNAL MEDICINE

## 2022-10-28 PROCEDURE — 74177 CT ABD & PELVIS W/CONTRAST: CPT | Mod: 26,,, | Performed by: INTERNAL MEDICINE

## 2022-10-28 PROCEDURE — 71260 CT CHEST ABDOMEN PELVIS WITH CONTRAST (XPD): ICD-10-PCS | Mod: 26,,, | Performed by: INTERNAL MEDICINE

## 2022-10-28 RX ADMIN — IOHEXOL 75 ML: 350 INJECTION, SOLUTION INTRAVENOUS at 02:10

## 2022-11-07 ENCOUNTER — HOSPITAL ENCOUNTER (OUTPATIENT)
Dept: RADIOLOGY | Facility: HOSPITAL | Age: 62
Discharge: HOME | End: 2022-11-07
Attending: NURSE PRACTITIONER
Payer: MEDICAID

## 2022-11-07 PROCEDURE — 76536 US EXAM OF HEAD AND NECK: CPT | Mod: TC

## 2022-11-07 PROCEDURE — 76536 US SOFT TISSUE HEAD NECK THYROID: ICD-10-PCS | Mod: 26,,, | Performed by: STUDENT IN AN ORGANIZED HEALTH CARE EDUCATION/TRAINING PROGRAM

## 2022-11-07 PROCEDURE — 76536 US EXAM OF HEAD AND NECK: CPT | Mod: 26,,, | Performed by: STUDENT IN AN ORGANIZED HEALTH CARE EDUCATION/TRAINING PROGRAM

## 2022-12-07 DIAGNOSIS — E11.51 DM (DIABETES MELLITUS) TYPE II, CONTROLLED, WITH PERIPHERAL VASCULAR DISORDER: ICD-10-CM

## 2022-12-07 RX ORDER — DULAGLUTIDE 3 MG/.5ML
3 INJECTION, SOLUTION SUBCUTANEOUS
Qty: 4 PEN | Refills: 11 | Status: SHIPPED | OUTPATIENT
Start: 2022-12-07 | End: 2023-03-23 | Stop reason: SDUPTHER

## 2023-01-10 ENCOUNTER — LAB VISIT (OUTPATIENT)
Dept: LAB | Facility: HOSPITAL | Age: 63
End: 2023-01-10
Attending: NURSE PRACTITIONER
Payer: MEDICAID

## 2023-01-10 DIAGNOSIS — Z71.2 ENCOUNTER TO DISCUSS TEST RESULTS: ICD-10-CM

## 2023-01-10 LAB
ALBUMIN SERPL BCP-MCNC: 3.6 G/DL (ref 3.5–5.2)
ALP SERPL-CCNC: 104 U/L (ref 55–135)
ALT SERPL W/O P-5'-P-CCNC: 16 U/L (ref 10–44)
ANION GAP SERPL CALC-SCNC: 7 MMOL/L (ref 8–16)
AST SERPL-CCNC: 16 U/L (ref 10–40)
BASOPHILS # BLD AUTO: 0.07 K/UL (ref 0–0.2)
BASOPHILS NFR BLD: 0.7 % (ref 0–1.9)
BILIRUB SERPL-MCNC: 0.6 MG/DL (ref 0.1–1)
BUN SERPL-MCNC: 11 MG/DL (ref 8–23)
CALCIUM SERPL-MCNC: 9.4 MG/DL (ref 8.7–10.5)
CHLORIDE SERPL-SCNC: 108 MMOL/L (ref 95–110)
CHOLEST SERPL-MCNC: 129 MG/DL (ref 120–199)
CHOLEST/HDLC SERPL: 3.7 {RATIO} (ref 2–5)
CO2 SERPL-SCNC: 25 MMOL/L (ref 23–29)
CREAT SERPL-MCNC: 0.7 MG/DL (ref 0.5–1.4)
DIFFERENTIAL METHOD: ABNORMAL
EOSINOPHIL # BLD AUTO: 0.1 K/UL (ref 0–0.5)
EOSINOPHIL NFR BLD: 1.1 % (ref 0–8)
ERYTHROCYTE [DISTWIDTH] IN BLOOD BY AUTOMATED COUNT: 15 % (ref 11.5–14.5)
EST. GFR  (NO RACE VARIABLE): >60 ML/MIN/1.73 M^2
GLUCOSE SERPL-MCNC: 161 MG/DL (ref 70–110)
HCT VFR BLD AUTO: 41.9 % (ref 40–54)
HDLC SERPL-MCNC: 35 MG/DL (ref 40–75)
HDLC SERPL: 27.1 % (ref 20–50)
HGB BLD-MCNC: 13.8 G/DL (ref 14–18)
IMM GRANULOCYTES # BLD AUTO: 0.05 K/UL (ref 0–0.04)
IMM GRANULOCYTES NFR BLD AUTO: 0.5 % (ref 0–0.5)
LDLC SERPL CALC-MCNC: 79 MG/DL (ref 63–159)
LYMPHOCYTES # BLD AUTO: 3 K/UL (ref 1–4.8)
LYMPHOCYTES NFR BLD: 28.3 % (ref 18–48)
MCH RBC QN AUTO: 31.6 PG (ref 27–31)
MCHC RBC AUTO-ENTMCNC: 32.9 G/DL (ref 32–36)
MCV RBC AUTO: 96 FL (ref 82–98)
MONOCYTES # BLD AUTO: 0.8 K/UL (ref 0.3–1)
MONOCYTES NFR BLD: 7.2 % (ref 4–15)
NEUTROPHILS # BLD AUTO: 6.6 K/UL (ref 1.8–7.7)
NEUTROPHILS NFR BLD: 62.2 % (ref 38–73)
NONHDLC SERPL-MCNC: 94 MG/DL
NRBC BLD-RTO: 0 /100 WBC
PLATELET # BLD AUTO: 364 K/UL (ref 150–450)
PMV BLD AUTO: 11.4 FL (ref 9.2–12.9)
POTASSIUM SERPL-SCNC: 4.1 MMOL/L (ref 3.5–5.1)
PROT SERPL-MCNC: 7 G/DL (ref 6–8.4)
RBC # BLD AUTO: 4.37 M/UL (ref 4.6–6.2)
SODIUM SERPL-SCNC: 140 MMOL/L (ref 136–145)
TRIGL SERPL-MCNC: 75 MG/DL (ref 30–150)
WBC # BLD AUTO: 10.54 K/UL (ref 3.9–12.7)

## 2023-01-10 PROCEDURE — 80053 COMPREHEN METABOLIC PANEL: CPT | Performed by: NURSE PRACTITIONER

## 2023-01-10 PROCEDURE — 85025 COMPLETE CBC W/AUTO DIFF WBC: CPT | Performed by: NURSE PRACTITIONER

## 2023-01-10 PROCEDURE — 80061 LIPID PANEL: CPT | Performed by: NURSE PRACTITIONER

## 2023-01-10 PROCEDURE — 36415 COLL VENOUS BLD VENIPUNCTURE: CPT | Performed by: NURSE PRACTITIONER

## 2023-01-10 PROCEDURE — 83036 HEMOGLOBIN GLYCOSYLATED A1C: CPT | Performed by: NURSE PRACTITIONER

## 2023-01-10 PROCEDURE — 82570 ASSAY OF URINE CREATININE: CPT | Performed by: NURSE PRACTITIONER

## 2023-01-11 LAB
ESTIMATED AVG GLUCOSE: 120 MG/DL (ref 68–131)
HBA1C MFR BLD: 5.8 % (ref 4–5.6)

## 2023-01-27 ENCOUNTER — LAB VISIT (OUTPATIENT)
Dept: LAB | Facility: HOSPITAL | Age: 63
End: 2023-01-27
Attending: NURSE PRACTITIONER
Payer: MEDICAID

## 2023-01-27 DIAGNOSIS — R63.4 WEIGHT LOSS: ICD-10-CM

## 2023-01-27 DIAGNOSIS — N40.0 BENIGN PROSTATIC HYPERPLASIA, UNSPECIFIED WHETHER LOWER URINARY TRACT SYMPTOMS PRESENT: ICD-10-CM

## 2023-01-27 DIAGNOSIS — Z12.5 ENCOUNTER FOR SCREENING FOR MALIGNANT NEOPLASM OF PROSTATE: ICD-10-CM

## 2023-01-27 PROCEDURE — 84153 ASSAY OF PSA TOTAL: CPT | Performed by: NURSE PRACTITIONER

## 2023-01-28 LAB — COMPLEXED PSA SERPL-MCNC: 4.7 NG/ML (ref 0–4)

## 2023-03-23 PROBLEM — E78.5 HYPERLIPIDEMIA: Status: ACTIVE | Noted: 2023-03-23

## 2023-03-23 PROBLEM — I73.9 PERIPHERAL VASCULAR DISEASE OF LOWER EXTREMITY: Status: ACTIVE | Noted: 2019-09-11

## 2023-03-23 PROBLEM — R07.9 CHEST PAIN: Status: ACTIVE | Noted: 2019-09-17

## 2023-03-23 PROBLEM — R11.2 NAUSEA WITH VOMITING: Status: ACTIVE | Noted: 2023-03-23

## 2023-03-23 PROBLEM — M25.572 ACUTE LEFT ANKLE PAIN: Status: ACTIVE | Noted: 2023-03-23

## 2025-04-15 NOTE — PT/OT/SLP PROGRESS
04/15/25 7:39 AM     Chart reviewed for Pap Smear (HPV) aka Cervical Cancer Screening ; nothing is submitted to the patient's insurance at this time.     Dianne Delarosa   [facility]   Occupational Therapy   Treatment    Name: eJrry Galeana  MRN: [id number]  Admitting Diagnosis:  Critical lower limb ischemia       Recommendations:     Discharge Recommendations: rehabilitation facility  Discharge Equipment Recommendations:  (ongoing assessment pending pt progress)  Barriers to discharge:  Decreased caregiver support    Assessment:     Jerry Galeana is a 59 y.o. male with a medical diagnosis of Critical lower limb ischemia.  He presents with increased pain, presenting the same as eval on 08/08/19 with jerkiness with sharp pains to LLE with any/no touch to L foot/ankle. This pain has interfered with pt's functional activity tolerance, independence with ADLs, and functional mobility. Performance deficits affecting function are weakness, impaired endurance, impaired self care skills, decreased lower extremity function, edema, impaired balance, gait instability, pain, impaired functional mobilty, decreased safety awareness, decreased ROM. Due to increased LLE pain, pt is unable to WB at all to LLE. Pt is a high fall risk and required MIN A to transfer from bed>chair with RW and unable to ambulate any farther. Pt demo's poor safety awareness with this short ambulation d/t unsteadiness and distracted with pain. Pt was I PTA and motivated to participate in order to achieve PLOF. Pt is unsafe to return home alone. For these reasons, OT rec. inpatient rehab at d/c.     Rehab Prognosis:  Good; patient would benefit from acute skilled OT services to address these deficits and reach maximum level of function.       Plan:     Patient to be seen 5 x/week to address the above listed problems via self-care/home management, therapeutic activities, therapeutic exercises  · Plan of Care Expires: 08/21/19  · Plan of Care Reviewed with: patient    Subjective     Chief complaint: fears going home like this- wants any therapy he can get once he leaves here  Patient's comments/goals: continued therapy, reduce pain      Pain/Comfort:  · Pain Rating 1: 10/10  · Location - Side 1: Left  · Location - Orientation 1: upper  · Location 1: foot(and ankle)  · Pain Addressed 1: Pre-medicate for activity, Nurse notified, Reposition, Distraction, Cessation of Activity  · Pain Rating Post-Intervention 1: (remained throughout session)    Objective:     Communicated with: Patient found HOB elevated with bed alarm, peripheral IV upon OT entry to room.    General Precautions: Standard, fall   Orthopedic Precautions:N/A   Braces: N/A     Occupational Performance:     Bed Mobility:    · Patient completed Rolling/Turning to Right with supervision  · Patient completed Scooting/Bridging with supervision  · Patient completed Supine to Sit with stand by assistance, with side rail and HOB elevated     Functional Mobility/Transfers:  · Patient completed Sit <> Stand Transfer with minimum assistance  with  rolling walker   · Patient completed Bed <> Chair Transfer using Step Transfer technique with minimum assistance with rolling walker  · Functional Mobility: Pt ambulated ~4-5 steps with RW, ambulating with LLE NWB d/t pain from bed>chair. Pt was unsafe with use of RW, requiring MIN A for RW management and unsteadiness on feet.     Activities of Daily Living:  · Feeding:  set-up with coffee        [facility] 6 Click ADL: 21    Treatment & Education:  · Pt re-educated on OT role/POC.   · Importance of OOB activity with staff assistance.  · Safety during functional t/f and mobility   · Increased time required to address safety with RW, with OT demo and consistent cueing during ambulation of short distance   · Handout given with green theraband; pt completed 1x15 of:   · Elbow flexion  · Elbow extension   · Shoulder flexion/extension with ab/dduction (unable to do RUE do pain with IV site with that movement)   · External rotation   · Pt required OT demo of each exercise with mod tactile and verbal cueing for correct implementation; pt required rest breaks  intermittently   · edu to complete 2x/day, 15 reps of each. Pt agreed.   · White board updated   · Self-care tasks/functional mobility completed- assistance level noted above   · All questions/concerns answered within OT scope of practice       Patient left up in chair with all lines intact, call button in reach and nurse, Valentina, and PCTNneka  notifiedEducation:  . Chair alarm set-up and plugged in, but alarm is missing the outlet to the wall. PCT and nurse notified; pt edu on in-room safety and to call for assistance to get up- pt agreed.     GOALS:   Multidisciplinary Problems     Occupational Therapy Goals        Problem: Occupational Therapy Goal    Goal Priority Disciplines Outcome Interventions   Occupational Therapy Goal     OT, PT/OT Ongoing (interventions implemented as appropriate)    Description:  Goals to be met by: 08/21/19     Patient will increase functional independence with ADLs by performing:    UE Dressing with Modified [place].  LE Dressing with Supervision.  Grooming while seated with Set-up Assistance.  Toileting from bedside commode with Stand-by Assistance for hygiene and clothing management.   Sitting at edge of bed x10 minutes with Supervision.  Step transfer with Stand-by Assistance  Toilet transfer to bedside commode with Stand-by Assistance.  Upper extremity exercise program x15 reps per handout, with independence.                      Time Tracking:     OT Date of Treatment: 08/08/19  OT Start Time: 1023  OT Stop Time: 1050  OT Total Time (min): 27 min    Billable Minutes:Therapeutic Activity 8 min  Therapeutic Exercise 19 min  Total Time 27 min    Avelina Meza OT  8/8/2019

## 2025-06-03 NOTE — TELEPHONE ENCOUNTER
Health Maintenance       COVID-19 Vaccine (1 - Pediatric 2024-25 season)  Never done    Annual Physical (ages 3 - 21) (Yearly)  Scheduled for 6/3/2025          Following review of the above:  Pended orders  Patient wishes to discuss with clinician: HPV  Patient is not proceeding with: COVID-19    Note: Refer to final orders and clinician documentation.         Call to patient about appointment. Left date, time and location on VM. Left callback number. Will mail out appointment slip.